# Patient Record
Sex: FEMALE | Race: BLACK OR AFRICAN AMERICAN | NOT HISPANIC OR LATINO | URBAN - METROPOLITAN AREA
[De-identification: names, ages, dates, MRNs, and addresses within clinical notes are randomized per-mention and may not be internally consistent; named-entity substitution may affect disease eponyms.]

---

## 2023-10-31 ENCOUNTER — INPATIENT (INPATIENT)
Facility: HOSPITAL | Age: 63
LOS: 6 days | Discharge: NURSING, REHAB OR LONG-TERM CARE | End: 2023-11-07
Attending: STUDENT IN AN ORGANIZED HEALTH CARE EDUCATION/TRAINING PROGRAM | Admitting: STUDENT IN AN ORGANIZED HEALTH CARE EDUCATION/TRAINING PROGRAM
Payer: MEDICARE

## 2023-10-31 VITALS
SYSTOLIC BLOOD PRESSURE: 149 MMHG | WEIGHT: 285.94 LBS | HEART RATE: 80 BPM | DIASTOLIC BLOOD PRESSURE: 73 MMHG | RESPIRATION RATE: 16 BRPM | TEMPERATURE: 98 F | HEIGHT: 65 IN | OXYGEN SATURATION: 98 %

## 2023-10-31 LAB
ALBUMIN SERPL ELPH-MCNC: 2.9 G/DL — LOW (ref 3.3–5)
ALBUMIN SERPL ELPH-MCNC: 2.9 G/DL — LOW (ref 3.3–5)
ALP SERPL-CCNC: 121 U/L — HIGH (ref 40–120)
ALP SERPL-CCNC: 121 U/L — HIGH (ref 40–120)
ALT FLD-CCNC: 34 U/L — SIGNIFICANT CHANGE UP (ref 12–78)
ALT FLD-CCNC: 34 U/L — SIGNIFICANT CHANGE UP (ref 12–78)
ANION GAP SERPL CALC-SCNC: 4 MMOL/L — LOW (ref 5–17)
ANION GAP SERPL CALC-SCNC: 4 MMOL/L — LOW (ref 5–17)
ANISOCYTOSIS BLD QL: SLIGHT — SIGNIFICANT CHANGE UP
ANISOCYTOSIS BLD QL: SLIGHT — SIGNIFICANT CHANGE UP
APPEARANCE UR: ABNORMAL
APPEARANCE UR: ABNORMAL
AST SERPL-CCNC: 22 U/L — SIGNIFICANT CHANGE UP (ref 15–37)
AST SERPL-CCNC: 22 U/L — SIGNIFICANT CHANGE UP (ref 15–37)
BACTERIA # UR AUTO: ABNORMAL
BACTERIA # UR AUTO: ABNORMAL
BASOPHILS # BLD AUTO: 0.03 K/UL — SIGNIFICANT CHANGE UP (ref 0–0.2)
BASOPHILS # BLD AUTO: 0.03 K/UL — SIGNIFICANT CHANGE UP (ref 0–0.2)
BASOPHILS NFR BLD AUTO: 0.4 % — SIGNIFICANT CHANGE UP (ref 0–2)
BASOPHILS NFR BLD AUTO: 0.4 % — SIGNIFICANT CHANGE UP (ref 0–2)
BILIRUB SERPL-MCNC: 0.6 MG/DL — SIGNIFICANT CHANGE UP (ref 0.2–1.2)
BILIRUB SERPL-MCNC: 0.6 MG/DL — SIGNIFICANT CHANGE UP (ref 0.2–1.2)
BILIRUB UR-MCNC: NEGATIVE — SIGNIFICANT CHANGE UP
BILIRUB UR-MCNC: NEGATIVE — SIGNIFICANT CHANGE UP
BUN SERPL-MCNC: 15 MG/DL — SIGNIFICANT CHANGE UP (ref 7–23)
BUN SERPL-MCNC: 15 MG/DL — SIGNIFICANT CHANGE UP (ref 7–23)
CALCIUM SERPL-MCNC: 8.3 MG/DL — LOW (ref 8.5–10.1)
CALCIUM SERPL-MCNC: 8.3 MG/DL — LOW (ref 8.5–10.1)
CHLORIDE SERPL-SCNC: 111 MMOL/L — HIGH (ref 96–108)
CHLORIDE SERPL-SCNC: 111 MMOL/L — HIGH (ref 96–108)
CO2 SERPL-SCNC: 25 MMOL/L — SIGNIFICANT CHANGE UP (ref 22–31)
CO2 SERPL-SCNC: 25 MMOL/L — SIGNIFICANT CHANGE UP (ref 22–31)
COLOR SPEC: YELLOW — SIGNIFICANT CHANGE UP
COLOR SPEC: YELLOW — SIGNIFICANT CHANGE UP
COMMENT - URINE: SIGNIFICANT CHANGE UP
COMMENT - URINE: SIGNIFICANT CHANGE UP
CREAT SERPL-MCNC: 1.1 MG/DL — SIGNIFICANT CHANGE UP (ref 0.5–1.3)
CREAT SERPL-MCNC: 1.1 MG/DL — SIGNIFICANT CHANGE UP (ref 0.5–1.3)
DIFF PNL FLD: ABNORMAL
DIFF PNL FLD: ABNORMAL
EGFR: 57 ML/MIN/1.73M2 — LOW
EGFR: 57 ML/MIN/1.73M2 — LOW
EOSINOPHIL # BLD AUTO: 0.2 K/UL — SIGNIFICANT CHANGE UP (ref 0–0.5)
EOSINOPHIL # BLD AUTO: 0.2 K/UL — SIGNIFICANT CHANGE UP (ref 0–0.5)
EOSINOPHIL NFR BLD AUTO: 2.7 % — SIGNIFICANT CHANGE UP (ref 0–6)
EOSINOPHIL NFR BLD AUTO: 2.7 % — SIGNIFICANT CHANGE UP (ref 0–6)
EPI CELLS # UR: SIGNIFICANT CHANGE UP
EPI CELLS # UR: SIGNIFICANT CHANGE UP
GLUCOSE SERPL-MCNC: 131 MG/DL — HIGH (ref 70–99)
GLUCOSE SERPL-MCNC: 131 MG/DL — HIGH (ref 70–99)
GLUCOSE UR QL: NEGATIVE MG/DL — SIGNIFICANT CHANGE UP
GLUCOSE UR QL: NEGATIVE MG/DL — SIGNIFICANT CHANGE UP
HCT VFR BLD CALC: 30.8 % — LOW (ref 34.5–45)
HCT VFR BLD CALC: 30.8 % — LOW (ref 34.5–45)
HGB BLD-MCNC: 9.6 G/DL — LOW (ref 11.5–15.5)
HGB BLD-MCNC: 9.6 G/DL — LOW (ref 11.5–15.5)
IMM GRANULOCYTES NFR BLD AUTO: 0.4 % — SIGNIFICANT CHANGE UP (ref 0–0.9)
IMM GRANULOCYTES NFR BLD AUTO: 0.4 % — SIGNIFICANT CHANGE UP (ref 0–0.9)
KETONES UR-MCNC: NEGATIVE — SIGNIFICANT CHANGE UP
KETONES UR-MCNC: NEGATIVE — SIGNIFICANT CHANGE UP
LEUKOCYTE ESTERASE UR-ACNC: ABNORMAL
LEUKOCYTE ESTERASE UR-ACNC: ABNORMAL
LYMPHOCYTES # BLD AUTO: 1.92 K/UL — SIGNIFICANT CHANGE UP (ref 1–3.3)
LYMPHOCYTES # BLD AUTO: 1.92 K/UL — SIGNIFICANT CHANGE UP (ref 1–3.3)
LYMPHOCYTES # BLD AUTO: 25.8 % — SIGNIFICANT CHANGE UP (ref 13–44)
LYMPHOCYTES # BLD AUTO: 25.8 % — SIGNIFICANT CHANGE UP (ref 13–44)
MANUAL SMEAR VERIFICATION: YES — SIGNIFICANT CHANGE UP
MANUAL SMEAR VERIFICATION: YES — SIGNIFICANT CHANGE UP
MCHC RBC-ENTMCNC: 22.4 PG — LOW (ref 27–34)
MCHC RBC-ENTMCNC: 22.4 PG — LOW (ref 27–34)
MCHC RBC-ENTMCNC: 31.2 G/DL — LOW (ref 32–36)
MCHC RBC-ENTMCNC: 31.2 G/DL — LOW (ref 32–36)
MCV RBC AUTO: 72 FL — LOW (ref 80–100)
MCV RBC AUTO: 72 FL — LOW (ref 80–100)
MONOCYTES # BLD AUTO: 0.53 K/UL — SIGNIFICANT CHANGE UP (ref 0–0.9)
MONOCYTES # BLD AUTO: 0.53 K/UL — SIGNIFICANT CHANGE UP (ref 0–0.9)
MONOCYTES NFR BLD AUTO: 7.1 % — SIGNIFICANT CHANGE UP (ref 2–14)
MONOCYTES NFR BLD AUTO: 7.1 % — SIGNIFICANT CHANGE UP (ref 2–14)
NEUTROPHILS # BLD AUTO: 4.74 K/UL — SIGNIFICANT CHANGE UP (ref 1.8–7.4)
NEUTROPHILS # BLD AUTO: 4.74 K/UL — SIGNIFICANT CHANGE UP (ref 1.8–7.4)
NEUTROPHILS NFR BLD AUTO: 63.6 % — SIGNIFICANT CHANGE UP (ref 43–77)
NEUTROPHILS NFR BLD AUTO: 63.6 % — SIGNIFICANT CHANGE UP (ref 43–77)
NITRITE UR-MCNC: NEGATIVE — SIGNIFICANT CHANGE UP
NITRITE UR-MCNC: NEGATIVE — SIGNIFICANT CHANGE UP
NRBC # BLD: 0 /100 WBCS — SIGNIFICANT CHANGE UP (ref 0–0)
NRBC # BLD: 0 /100 WBCS — SIGNIFICANT CHANGE UP (ref 0–0)
OVALOCYTES BLD QL SMEAR: SLIGHT — SIGNIFICANT CHANGE UP
OVALOCYTES BLD QL SMEAR: SLIGHT — SIGNIFICANT CHANGE UP
PH UR: 6 — SIGNIFICANT CHANGE UP (ref 5–8)
PH UR: 6 — SIGNIFICANT CHANGE UP (ref 5–8)
PLAT MORPH BLD: NORMAL — SIGNIFICANT CHANGE UP
PLAT MORPH BLD: NORMAL — SIGNIFICANT CHANGE UP
PLATELET # BLD AUTO: 252 K/UL — SIGNIFICANT CHANGE UP (ref 150–400)
PLATELET # BLD AUTO: 252 K/UL — SIGNIFICANT CHANGE UP (ref 150–400)
POTASSIUM SERPL-MCNC: 3.6 MMOL/L — SIGNIFICANT CHANGE UP (ref 3.5–5.3)
POTASSIUM SERPL-MCNC: 3.6 MMOL/L — SIGNIFICANT CHANGE UP (ref 3.5–5.3)
POTASSIUM SERPL-SCNC: 3.6 MMOL/L — SIGNIFICANT CHANGE UP (ref 3.5–5.3)
POTASSIUM SERPL-SCNC: 3.6 MMOL/L — SIGNIFICANT CHANGE UP (ref 3.5–5.3)
PROT SERPL-MCNC: 9.5 GM/DL — HIGH (ref 6–8.3)
PROT SERPL-MCNC: 9.5 GM/DL — HIGH (ref 6–8.3)
PROT UR-MCNC: 100 MG/DL
PROT UR-MCNC: 100 MG/DL
RBC # BLD: 4.28 M/UL — SIGNIFICANT CHANGE UP (ref 3.8–5.2)
RBC # BLD: 4.28 M/UL — SIGNIFICANT CHANGE UP (ref 3.8–5.2)
RBC # FLD: 20.4 % — HIGH (ref 10.3–14.5)
RBC # FLD: 20.4 % — HIGH (ref 10.3–14.5)
RBC BLD AUTO: ABNORMAL
RBC BLD AUTO: ABNORMAL
RBC CASTS # UR COMP ASSIST: ABNORMAL /HPF (ref 0–4)
RBC CASTS # UR COMP ASSIST: ABNORMAL /HPF (ref 0–4)
SODIUM SERPL-SCNC: 140 MMOL/L — SIGNIFICANT CHANGE UP (ref 135–145)
SODIUM SERPL-SCNC: 140 MMOL/L — SIGNIFICANT CHANGE UP (ref 135–145)
SP GR SPEC: 1.01 — SIGNIFICANT CHANGE UP (ref 1.01–1.02)
SP GR SPEC: 1.01 — SIGNIFICANT CHANGE UP (ref 1.01–1.02)
UROBILINOGEN FLD QL: NEGATIVE MG/DL — SIGNIFICANT CHANGE UP
UROBILINOGEN FLD QL: NEGATIVE MG/DL — SIGNIFICANT CHANGE UP
WBC # BLD: 7.45 K/UL — SIGNIFICANT CHANGE UP (ref 3.8–10.5)
WBC # BLD: 7.45 K/UL — SIGNIFICANT CHANGE UP (ref 3.8–10.5)
WBC # FLD AUTO: 7.45 K/UL — SIGNIFICANT CHANGE UP (ref 3.8–10.5)
WBC # FLD AUTO: 7.45 K/UL — SIGNIFICANT CHANGE UP (ref 3.8–10.5)
WBC UR QL: >50
WBC UR QL: >50

## 2023-10-31 PROCEDURE — 99285 EMERGENCY DEPT VISIT HI MDM: CPT

## 2023-10-31 RX ORDER — DEXTROSE 50 % IN WATER 50 %
25 SYRINGE (ML) INTRAVENOUS ONCE
Refills: 0 | Status: DISCONTINUED | OUTPATIENT
Start: 2023-10-31 | End: 2023-11-07

## 2023-10-31 RX ORDER — DEXTROSE 50 % IN WATER 50 %
15 SYRINGE (ML) INTRAVENOUS ONCE
Refills: 0 | Status: DISCONTINUED | OUTPATIENT
Start: 2023-10-31 | End: 2023-11-07

## 2023-10-31 RX ORDER — SODIUM CHLORIDE 9 MG/ML
1000 INJECTION, SOLUTION INTRAVENOUS
Refills: 0 | Status: DISCONTINUED | OUTPATIENT
Start: 2023-10-31 | End: 2023-11-07

## 2023-10-31 RX ORDER — DEXTROSE 50 % IN WATER 50 %
12.5 SYRINGE (ML) INTRAVENOUS ONCE
Refills: 0 | Status: DISCONTINUED | OUTPATIENT
Start: 2023-10-31 | End: 2023-11-07

## 2023-10-31 RX ORDER — GLUCAGON INJECTION, SOLUTION 0.5 MG/.1ML
1 INJECTION, SOLUTION SUBCUTANEOUS ONCE
Refills: 0 | Status: DISCONTINUED | OUTPATIENT
Start: 2023-10-31 | End: 2023-11-07

## 2023-10-31 RX ORDER — INSULIN LISPRO 100/ML
VIAL (ML) SUBCUTANEOUS
Refills: 0 | Status: DISCONTINUED | OUTPATIENT
Start: 2023-10-31 | End: 2023-11-07

## 2023-10-31 RX ORDER — HEPARIN SODIUM 5000 [USP'U]/ML
5000 INJECTION INTRAVENOUS; SUBCUTANEOUS EVERY 8 HOURS
Refills: 0 | Status: DISCONTINUED | OUTPATIENT
Start: 2023-10-31 | End: 2023-11-07

## 2023-10-31 RX ORDER — CHLORHEXIDINE GLUCONATE 213 G/1000ML
1 SOLUTION TOPICAL
Refills: 0 | Status: DISCONTINUED | OUTPATIENT
Start: 2023-10-31 | End: 2023-11-07

## 2023-10-31 RX ORDER — ACETAMINOPHEN 500 MG
650 TABLET ORAL ONCE
Refills: 0 | Status: COMPLETED | OUTPATIENT
Start: 2023-10-31 | End: 2023-10-31

## 2023-10-31 RX ADMIN — Medication 325 MILLIGRAM(S): at 05:22

## 2023-10-31 NOTE — ED ADULT NURSE REASSESSMENT NOTE - NS ED NURSE REASSESS COMMENT FT1
Pt drifting in and out of sleep at this time. Pt is uncooperative with staff and refusing blood work, Dr. Liao made aware. Pt only took partial tylenol dose pt refusing to take second pill, dosage taken documented at this time. Pt educated the benefits of taking blood work at this time but continues to refuse.

## 2023-10-31 NOTE — PHYSICAL THERAPY INITIAL EVALUATION ADULT - PERTINENT HX OF CURRENT PROBLEM, REHAB EVAL
BLE pain.   As per ED provider note "63F PMH HTN, HLD, DM, chronic BLE lymphedema BIBEMS d/t social issue. Pt reports recent d/c v AMA from rehab (in rehab d/t issue w/ her legs), pt returned to home w/ daughter, pt & daughter w/ disagreement, pt left home to go to shelter, pt out in cold x2-3hrs awaiting ride to shelter, when ride did not come, pt called 911 to bring her to ED. Pt endorses chills, LE pain."   As per pt, she had this lymphedema wound for last 8 months, wound care dressing done in [facility] ([place]). PT attempted to see BLE wounds RLE- more with slough, however pt stated she wants to see a doctor to see it. Hence vascular surgery consult recommended

## 2023-10-31 NOTE — ED PROVIDER NOTE - PROGRESS NOTE DETAILS
Pt refused blood work. Patient signed out to me pending [facility] merida. Per daughter Mayelin, [phone] patient cannot stay with her as she does not have room at her house. Patient states she could go to a shelter, however per PT should return to [facility]. VETO unsure if patient has any remaining [facility] days given recent admission, they will evaluate. Duran DO: pt signed out to me pending SW eval and possible rehab placement, pt refusing hospital admission, refusing to go to rehab, states she Duran DO: pt signed out to me pending SW eval and possible rehab placement, pt refusing hospital admission, refusing to go to rehab, states she does not want to stay Duran DO: after much back and forth with pt and with SW, pt now amenable to hospital admission, needs special equipment for home/ mobility, has no where safe to go, recommended for [facility], d/w hospitalist

## 2023-10-31 NOTE — PHYSICAL THERAPY INITIAL EVALUATION ADULT - ADDITIONAL COMMENTS
As per pt she was in [facility], came to daughter house, now planning to go to  [facility] or shelter. Pt is independent with rollator at all times, has BLE lymphedema with wounds at this time,

## 2023-10-31 NOTE — ED ADULT NURSE NOTE - NSFALLUNIVINTERV_ED_ALL_ED
Bed/Stretcher in lowest position, wheels locked, appropriate side rails in place/Call bell, personal items and telephone in reach/Instruct patient to call for assistance before getting out of bed/chair/stretcher/Non-slip footwear applied when patient is off stretcher/[place] to call system/Physically safe environment - no spills, clutter or unnecessary equipment/Purposeful proactive rounding/Room/bathroom lighting operational, light cord in reach

## 2023-10-31 NOTE — PHYSICAL THERAPY INITIAL EVALUATION ADULT - STRENGTHENING, PT EVAL
Pt will improve muscle strength in all extremities to WFL in 1 to 2 weeks to perform Gait independently

## 2023-10-31 NOTE — H&P ADULT - NSHPLABSRESULTS_GEN_ALL_CORE
(10-31 @ 12:50)                      9.6  7.45 )-----------( 252                 30.8    Neutrophils = 4.74 (63.6%)  Lymphocytes = 1.92 (25.8%)  Eosinophils = 0.20 (2.7%)  Basophils = 0.03 (0.4%)  Monocytes = 0.53 (7.1%)  Bands = --%    10-31    140  |  111<H>  |  15  ----------------------------<  131<H>  3.6   |  25  |  1.10    Ca    8.3<L>      31 Oct 2023 12:50    TPro  9.5<H>  /  Alb  2.9<L>  /  TBili  0.6  /  DBili  x   /  AST  22  /  ALT  34  /  AlkPhos  121<H>  10-31          RVP:          Tox:         Urinalysis Basic - ( 31 Oct 2023 12:50 )    Color: Yellow / Appearance: very cloudy / S.015 / pH: x  Gluc: 131 mg/dL / Ketone: Negative  / Bili: Negative / Urobili: Negative mg/dL   Blood: x / Protein: 100 mg/dL / Nitrite: Negative   Leuk Esterase: Moderate / RBC: 25-50 /HPF / WBC >50   Sq Epi: x / Non Sq Epi: x / Bacteria: Many

## 2023-10-31 NOTE — PHYSICAL THERAPY INITIAL EVALUATION ADULT - LIVES WITH, PROFILE
was in a [facility], signed AMA, came to daughter house now some personal issues with dtr, wants to  go to [facility] or shelter

## 2023-10-31 NOTE — ED PROVIDER NOTE - OBJECTIVE STATEMENT
63F PMH HTN, HLD, DM, chronic BLE lymphedema BIBEMS d/t social issue. Pt reports recent d/c v AMA from rehab (in rehab d/t issue w/ her legs), pt returned to home w/ daughter, pt & daughter w/ disagreement, pt left home to go to shelter, pt out in cold x2-3hrs awaiting ride to shelter, when ride did not come, pt called 911 to bring her to ED. Pt endorses chills, LE pain. Denies fever, h/a, dizziness, CP, SOB, cough, abd pain, back pain, N/V/D/C, UTI sx, recent travel or sick contacts.     PMH as above, PSH none, NKDA, meds as listed.

## 2023-10-31 NOTE — PATIENT PROFILE ADULT - FALL HARM RISK - HARM RISK INTERVENTIONS

## 2023-10-31 NOTE — ED ADULT NURSE NOTE - OBJECTIVE STATEMENT
Pt A&Ox3. Pt complains of pain and swelling to bilateral legs. History of lymphedema. Pt states she was standing outside waiting for a ride to go to the [facility] center x 2-3 hours and no one came. Pt is uncooperative at this time. Pt refuses to change into hospital gown and refuses to answer all assessment questions. When asked how long her legs have been swollen pt states "I cant answer that, i've always had lymphedema". Pt denies chest pain, difficulty breathing, N/V/D, fever, chills at this time. Pt states "I have SOB off and on" unable to tell me when the SOB began. PMHx DM, HTN. Pt walked in with walker at this time with many bags of belongings and states " me and my stuff is a package deal, it stays with me". Belongings are with pt at this time, placed to the side of the bed. Pt refuses to lay down on stretcher and is sitting on the side of the bed. Bilateral legs appear swollen at this time and ankles are dry the skin apears scaly.

## 2023-10-31 NOTE — ED PROVIDER NOTE - PHYSICAL EXAMINATION
GEN: Awake, alert, interactive, NAD.  HEAD AND NECK: NC/AT. Airway patent. Neck supple.   EYES:  Clear b/l. EOMI. PERRL.   ENT: Moist mucus membranes.   CARDIAC: Regular rate, regular rhythm. No evident pedal edema.    RESP/CHEST: Normal respiratory effort with no use of accessory muscles or retractions. Clear throughout on auscultation.  ABD: Soft, non-distended, non-tender. No rebound, no guarding.   BACK: No midline spinal TTP. No CVAT.   EXTREMITIES: BLE edema c/w pt hx lymphedema.    SKIN: Warm, dry, intact normal color. No rash.   NEURO: AOx3, CN II-XII grossly intact, no focal deficits.   PSYCH: Appropriate mood and affect.

## 2023-10-31 NOTE — H&P ADULT - NSHPPHYSICALEXAM_GEN_ALL_CORE
PHYSICAL EXAM:  GENERAL: NAD, lying in bed comfortably  HEAD:  Atraumatic, Normocephalic  EYES: EOMI, PERRLA, conjunctiva and sclera clear  ENT: Moist mucous membranes  NECK: Supple, No JVD  CHEST/LUNG: Clear to auscultation bilaterally; No rales, rhonchi, wheezing, or rubs. Unlabored respirations  HEART: Regular rate and rhythm; No murmurs, rubs, or gallops  ABDOMEN: Bowel sounds present; Soft, Nontender, Nondistended. No hepatomegaly  NERVOUS SYSTEM:  Alert & Oriented X3, speech clear. No deficits   MSK: FROM all 4 extremities, full and equal strength  SKIN: BLE lymphedema

## 2023-10-31 NOTE — PHYSICAL THERAPY INITIAL EVALUATION ADULT - CRITERIA FOR SKILLED THERAPEUTIC INTERVENTIONS
Medication/Dose: multiple meds    Normal Creatinine within last 12 months looking at last value    Normal AST in last 12 months looking at last value    Normal WBC, HGB, HCT & PLT in last 12 months looking at last value    Normal ALT in last 12 months looking at last value   Patient last seen by PCP: 07/20/22  Next office visit with PCP: 01/19/22  Last Lab:01/03/22  Last Ordered: 08/08/22  PDMP needs to be reviewed by Provider    Sent to provider to approve or deny         impairments found/anticipated equipment needs at discharge/anticipated discharge recommendation

## 2023-10-31 NOTE — PATIENT PROFILE ADULT - NSTOBACCOUNKNOWNSMK_GEN_A_CORE_RD
Refusal Spray Paint Text: The liquid nitrogen was applied to the skin utilizing a spray paint frosting technique. Medical Necessity Clause: This procedure was medically necessary because the lesions that were treated were: Medical Necessity Information: It is in your best interest to select a reason for this procedure from the list below. All of these items fulfill various CMS LCD requirements except the new and changing color options. Show Spray Paint Technique Variable?: Yes Render Post-Care Instructions In Note?: no Post-Care Instructions: I reviewed with the patient in detail post-care instructions. Patient is to wear sunprotection, and avoid picking at any of the treated lesions. Pt may apply Vaseline to crusted or scabbing areas. Detail Level: Detailed Consent: The patient's consent was obtained including but not limited to risks of crusting, scabbing, blistering, scarring, darker or lighter pigmentary change, recurrence, incomplete removal and infection.

## 2023-10-31 NOTE — H&P ADULT - HISTORY OF PRESENT ILLNESS
Ms. Saucedo is a 63F with a reported past medical history of T2DM, b/l chronic lymphedema, HTN, HLD who presented from home due to a social issue at home. Patient was admitted to a rehab facility for rehabilitation of her legs when she left AMA. She returned home with her daughter, then reportedly had a disagreement which led her back to the ED for PT and placement. She reports her and her daughter have several disagreements and legal issues. No major medical complaints.   In the ED, she was afebrile, mildly hypertensive. Blood work consistent with microcytic anemia and elevated protein. Admit to medicine for SW and PT eval.

## 2023-10-31 NOTE — PHYSICAL THERAPY INITIAL EVALUATION ADULT - TRANSFER TRAINING, PT EVAL
Pt will be able to perform sit to stand, stand pivot transfer using [Rollator independently in 2 to 3 weeks

## 2023-10-31 NOTE — ED ADULT TRIAGE NOTE - CHIEF COMPLAINT QUOTE
Pt complains of pain and swelling to bilateral legs. History of lymphedema. Pt states she was standing outside waiting [facility] x [facility] hours and no one came.

## 2023-10-31 NOTE — ED PROVIDER NOTE - CLINICAL SUMMARY MEDICAL DECISION MAKING FREE TEXT BOX
63F PMH HTN, HLD, DM, chronic BLE lymphedema BIBEMS d/t social issue. Afebrile, VSS. In NAD. Plan for CBC, CMP. Give Tylenol. Consult SW in AM. Pt care signed out at change of shift.

## 2023-10-31 NOTE — ED ADULT NURSE NOTE - CHIEF COMPLAINT QUOTE
Pt complains of pain and swelling to bilateral legs. History of lymphedema. Pt states she was standing outside [facility] and no one came.

## 2023-10-31 NOTE — H&P ADULT - ASSESSMENT
CLARK DENNIS is a 63y Female with a past medical history as described above who presented for evaluation of a social issue.    # Failure to thrive   - Brought to ED after daughter and pt had dispute. Need SW and PT eval for rehab placement.   - Albumin low, but total protein elevated. Check SPEP and IF  - Supplement with Ensure   - Increase activity as tolerated   - Local wound care to wounds on leg and lymphedema care     # Microcytic anemia   - Iron panel   - Reticulocyte count   - B12/folate     # T2DM  - Reports she takes Januvia, cannot recall dose. FS qAC and qHS, insulin protocol if needed   - CC diet     # HTN  #HLD  - Reports she takes Liptor, cannot recall dose. Verify with patient, family, or pharmacy in AM.   - CC diet     DVT ppx: Heparin  GI ppx: Not indicated   Activity: IAT  Diet: CC  Code: Full

## 2023-10-31 NOTE — ED ADULT NURSE REASSESSMENT NOTE - NS ED NURSE REASSESS COMMENT FT1
Pt A&Ox3. Pt continues walking around after being asked to sit down in the stretcher. Pt refuses. Pt made a phone call and is talking on the phone at this time. Pt asked for urine sample but states she doesn't have to go at this time. Pt is agitated and uncooperative at this time, after several attempts to get the pt in bed.

## 2023-10-31 NOTE — PHYSICAL THERAPY INITIAL EVALUATION ADULT - BALANCE TRAINING, PT EVAL
Pt will improve static & dynamic standing balance to Good using [Rollator  to perform  Gait independently in 4 weeks

## 2023-10-31 NOTE — H&P ADULT - NSHPREVIEWOFSYSTEMS_GEN_ALL_CORE
REVIEW OF SYSTEMS:    CONSTITUTIONAL: No weakness, fevers or chills  EYES/ENT: No visual changes;  No vertigo or throat pain   NECK: No pain or stiffness  RESPIRATORY: No cough, wheezing, hemoptysis; No shortness of breath  CARDIOVASCULAR: No chest pain or palpitations  GASTROINTESTINAL: No abdominal or epigastric pain. No nausea, vomiting, or hematemesis; No diarrhea or constipation. No melena or hematochezia.  GENITOURINARY: No dysuria, frequency or hematuria  NEUROLOGICAL: No numbness or weakness  SKIN: Weeping wound on R leg

## 2023-11-01 LAB
A1C WITH ESTIMATED AVERAGE GLUCOSE RESULT: 6.9 % — HIGH (ref 4–5.6)
A1C WITH ESTIMATED AVERAGE GLUCOSE RESULT: 6.9 % — HIGH (ref 4–5.6)
ALBUMIN SERPL ELPH-MCNC: 2.6 G/DL — LOW (ref 3.3–5)
ALBUMIN SERPL ELPH-MCNC: 2.6 G/DL — LOW (ref 3.3–5)
ALP SERPL-CCNC: 104 U/L — SIGNIFICANT CHANGE UP (ref 40–120)
ALP SERPL-CCNC: 104 U/L — SIGNIFICANT CHANGE UP (ref 40–120)
ALT FLD-CCNC: 26 U/L — SIGNIFICANT CHANGE UP (ref 12–78)
ALT FLD-CCNC: 26 U/L — SIGNIFICANT CHANGE UP (ref 12–78)
ANION GAP SERPL CALC-SCNC: 7 MMOL/L — SIGNIFICANT CHANGE UP (ref 5–17)
ANION GAP SERPL CALC-SCNC: 7 MMOL/L — SIGNIFICANT CHANGE UP (ref 5–17)
AST SERPL-CCNC: 16 U/L — SIGNIFICANT CHANGE UP (ref 15–37)
AST SERPL-CCNC: 16 U/L — SIGNIFICANT CHANGE UP (ref 15–37)
BILIRUB SERPL-MCNC: 0.5 MG/DL — SIGNIFICANT CHANGE UP (ref 0.2–1.2)
BILIRUB SERPL-MCNC: 0.5 MG/DL — SIGNIFICANT CHANGE UP (ref 0.2–1.2)
BUN SERPL-MCNC: 16 MG/DL — SIGNIFICANT CHANGE UP (ref 7–23)
BUN SERPL-MCNC: 16 MG/DL — SIGNIFICANT CHANGE UP (ref 7–23)
CALCIUM SERPL-MCNC: 8.1 MG/DL — LOW (ref 8.5–10.1)
CALCIUM SERPL-MCNC: 8.1 MG/DL — LOW (ref 8.5–10.1)
CHLORIDE SERPL-SCNC: 106 MMOL/L — SIGNIFICANT CHANGE UP (ref 96–108)
CHLORIDE SERPL-SCNC: 106 MMOL/L — SIGNIFICANT CHANGE UP (ref 96–108)
CO2 SERPL-SCNC: 27 MMOL/L — SIGNIFICANT CHANGE UP (ref 22–31)
CO2 SERPL-SCNC: 27 MMOL/L — SIGNIFICANT CHANGE UP (ref 22–31)
CREAT SERPL-MCNC: 0.92 MG/DL — SIGNIFICANT CHANGE UP (ref 0.5–1.3)
CREAT SERPL-MCNC: 0.92 MG/DL — SIGNIFICANT CHANGE UP (ref 0.5–1.3)
EGFR: 71 ML/MIN/1.73M2 — SIGNIFICANT CHANGE UP
EGFR: 71 ML/MIN/1.73M2 — SIGNIFICANT CHANGE UP
ESTIMATED AVERAGE GLUCOSE: 151 MG/DL — HIGH (ref 68–114)
ESTIMATED AVERAGE GLUCOSE: 151 MG/DL — HIGH (ref 68–114)
FERRITIN SERPL-MCNC: 40 NG/ML — SIGNIFICANT CHANGE UP (ref 13–330)
FERRITIN SERPL-MCNC: 40 NG/ML — SIGNIFICANT CHANGE UP (ref 13–330)
FOLATE SERPL-MCNC: 15 NG/ML — SIGNIFICANT CHANGE UP
FOLATE SERPL-MCNC: 15 NG/ML — SIGNIFICANT CHANGE UP
GLUCOSE BLDC GLUCOMTR-MCNC: 139 MG/DL — HIGH (ref 70–99)
GLUCOSE BLDC GLUCOMTR-MCNC: 139 MG/DL — HIGH (ref 70–99)
GLUCOSE BLDC GLUCOMTR-MCNC: 144 MG/DL — HIGH (ref 70–99)
GLUCOSE BLDC GLUCOMTR-MCNC: 144 MG/DL — HIGH (ref 70–99)
GLUCOSE BLDC GLUCOMTR-MCNC: 158 MG/DL — HIGH (ref 70–99)
GLUCOSE BLDC GLUCOMTR-MCNC: 158 MG/DL — HIGH (ref 70–99)
GLUCOSE SERPL-MCNC: 137 MG/DL — HIGH (ref 70–99)
GLUCOSE SERPL-MCNC: 137 MG/DL — HIGH (ref 70–99)
HCT VFR BLD CALC: 27.9 % — LOW (ref 34.5–45)
HCT VFR BLD CALC: 27.9 % — LOW (ref 34.5–45)
HCV AB S/CO SERPL IA: 0.17 S/CO — SIGNIFICANT CHANGE UP (ref 0–0.99)
HCV AB S/CO SERPL IA: 0.17 S/CO — SIGNIFICANT CHANGE UP (ref 0–0.99)
HCV AB SERPL-IMP: SIGNIFICANT CHANGE UP
HCV AB SERPL-IMP: SIGNIFICANT CHANGE UP
HGB BLD-MCNC: 8.6 G/DL — LOW (ref 11.5–15.5)
HGB BLD-MCNC: 8.6 G/DL — LOW (ref 11.5–15.5)
IRON SATN MFR SERPL: 10 % — LOW (ref 14–50)
IRON SATN MFR SERPL: 10 % — LOW (ref 14–50)
IRON SATN MFR SERPL: 22 UG/DL — LOW (ref 30–160)
IRON SATN MFR SERPL: 22 UG/DL — LOW (ref 30–160)
MAGNESIUM SERPL-MCNC: 2.1 MG/DL — SIGNIFICANT CHANGE UP (ref 1.6–2.6)
MAGNESIUM SERPL-MCNC: 2.1 MG/DL — SIGNIFICANT CHANGE UP (ref 1.6–2.6)
MCHC RBC-ENTMCNC: 22.3 PG — LOW (ref 27–34)
MCHC RBC-ENTMCNC: 22.3 PG — LOW (ref 27–34)
MCHC RBC-ENTMCNC: 30.8 G/DL — LOW (ref 32–36)
MCHC RBC-ENTMCNC: 30.8 G/DL — LOW (ref 32–36)
MCV RBC AUTO: 72.3 FL — LOW (ref 80–100)
MCV RBC AUTO: 72.3 FL — LOW (ref 80–100)
NRBC # BLD: 0 /100 WBCS — SIGNIFICANT CHANGE UP (ref 0–0)
NRBC # BLD: 0 /100 WBCS — SIGNIFICANT CHANGE UP (ref 0–0)
PLATELET # BLD AUTO: 236 K/UL — SIGNIFICANT CHANGE UP (ref 150–400)
PLATELET # BLD AUTO: 236 K/UL — SIGNIFICANT CHANGE UP (ref 150–400)
POTASSIUM SERPL-MCNC: 3.6 MMOL/L — SIGNIFICANT CHANGE UP (ref 3.5–5.3)
POTASSIUM SERPL-MCNC: 3.6 MMOL/L — SIGNIFICANT CHANGE UP (ref 3.5–5.3)
POTASSIUM SERPL-SCNC: 3.6 MMOL/L — SIGNIFICANT CHANGE UP (ref 3.5–5.3)
POTASSIUM SERPL-SCNC: 3.6 MMOL/L — SIGNIFICANT CHANGE UP (ref 3.5–5.3)
PROT SERPL-MCNC: 8.1 G/DL — SIGNIFICANT CHANGE UP (ref 6–8.3)
PROT SERPL-MCNC: 8.1 G/DL — SIGNIFICANT CHANGE UP (ref 6–8.3)
PROT SERPL-MCNC: 8.4 GM/DL — HIGH (ref 6–8.3)
PROT SERPL-MCNC: 8.4 GM/DL — HIGH (ref 6–8.3)
RBC # BLD: 3.86 M/UL — SIGNIFICANT CHANGE UP (ref 3.8–5.2)
RBC # FLD: 20.2 % — HIGH (ref 10.3–14.5)
RBC # FLD: 20.2 % — HIGH (ref 10.3–14.5)
RETICS #: 45.9 K/UL — SIGNIFICANT CHANGE UP (ref 25–125)
RETICS #: 45.9 K/UL — SIGNIFICANT CHANGE UP (ref 25–125)
RETICS/RBC NFR: 1.2 % — SIGNIFICANT CHANGE UP (ref 0.5–2.5)
RETICS/RBC NFR: 1.2 % — SIGNIFICANT CHANGE UP (ref 0.5–2.5)
SODIUM SERPL-SCNC: 140 MMOL/L — SIGNIFICANT CHANGE UP (ref 135–145)
SODIUM SERPL-SCNC: 140 MMOL/L — SIGNIFICANT CHANGE UP (ref 135–145)
TIBC SERPL-MCNC: 228 UG/DL — SIGNIFICANT CHANGE UP (ref 220–430)
TIBC SERPL-MCNC: 228 UG/DL — SIGNIFICANT CHANGE UP (ref 220–430)
UIBC SERPL-MCNC: 206 UG/DL — SIGNIFICANT CHANGE UP (ref 110–370)
UIBC SERPL-MCNC: 206 UG/DL — SIGNIFICANT CHANGE UP (ref 110–370)
VIT B12 SERPL-MCNC: 656 PG/ML — SIGNIFICANT CHANGE UP (ref 232–1245)
VIT B12 SERPL-MCNC: 656 PG/ML — SIGNIFICANT CHANGE UP (ref 232–1245)
WBC # BLD: 6.12 K/UL — SIGNIFICANT CHANGE UP (ref 3.8–10.5)
WBC # BLD: 6.12 K/UL — SIGNIFICANT CHANGE UP (ref 3.8–10.5)
WBC # FLD AUTO: 6.12 K/UL — SIGNIFICANT CHANGE UP (ref 3.8–10.5)
WBC # FLD AUTO: 6.12 K/UL — SIGNIFICANT CHANGE UP (ref 3.8–10.5)

## 2023-11-01 PROCEDURE — 99232 SBSQ HOSP IP/OBS MODERATE 35: CPT

## 2023-11-02 PROCEDURE — 99232 SBSQ HOSP IP/OBS MODERATE 35: CPT

## 2023-11-02 RX ORDER — IRON SUCROSE 20 MG/ML
100 INJECTION, SOLUTION INTRAVENOUS EVERY 24 HOURS
Refills: 0 | Status: DISCONTINUED | OUTPATIENT
Start: 2023-11-02 | End: 2023-11-07

## 2023-11-02 RX ADMIN — CHLORHEXIDINE GLUCONATE 1 APPLICATION(S): 213 SOLUTION TOPICAL at 05:58

## 2023-11-02 NOTE — DIETITIAN NUTRITION RISK NOTIFICATION - TREATMENT: THE FOLLOWING DIET HAS BEEN RECOMMENDED
Diet, Consistent Carbohydrate w/Evening Snack:   Low Sodium  Supplement Feeding Modality:  Oral  Glucerna Shake Cans or Servings Per Day:  1       Frequency:  Daily (11-02-23 @ 11:09) [Pending Verification By Attending]  Diet, Consistent Carbohydrate w/Evening Snack (10-31-23 @ 16:25) [Active]

## 2023-11-02 NOTE — DIETITIAN INITIAL EVALUATION ADULT - PERTINENT MEDS FT
MEDICATIONS  (STANDING):  chlorhexidine 2% Cloths 1 Application(s) Topical <User Schedule>  dextrose 5%. 1000 milliLiter(s) (100 mL/Hr) IV Continuous <Continuous>  dextrose 5%. 1000 milliLiter(s) (50 mL/Hr) IV Continuous <Continuous>  dextrose 50% Injectable 25 Gram(s) IV Push once  dextrose 50% Injectable 25 Gram(s) IV Push once  dextrose 50% Injectable 12.5 Gram(s) IV Push once  glucagon  Injectable 1 milliGRAM(s) IntraMuscular once  heparin   Injectable 5000 Unit(s) SubCutaneous every 8 hours  insulin lispro (ADMELOG) corrective regimen sliding scale   SubCutaneous three times a day before meals  iron sucrose IVPB 100 milliGRAM(s) IV Intermittent every 24 hours    MEDICATIONS  (PRN):  dextrose Oral Gel 15 Gram(s) Oral once PRN Blood Glucose LESS THAN 70 milliGRAM(s)/deciliter

## 2023-11-02 NOTE — DIETITIAN INITIAL EVALUATION ADULT - EDUCATION DIETARY MODIFICATIONS
Contacted pt. Verified identity of pt by name and  verbally.     Dr. Heredia's note conveyed. Patient verbalized understanding without further questions.    He will continue because having wheezing and cough.    (2) meets goals/outcomes/verbalization

## 2023-11-02 NOTE — DIETITIAN INITIAL EVALUATION ADULT - PERSON TAUGHT/METHOD
Reinforcement of portion-control, consistent CHO, low Na diet./verbal instruction/patient instructed

## 2023-11-02 NOTE — DIETITIAN INITIAL EVALUATION ADULT - PERTINENT LABORATORY DATA
11-01    140  |  106  |  16  ----------------------------<  137<H>  3.6   |  27  |  0.92    Ca    8.1<L>      01 Nov 2023 08:20  Mg     2.1     11-01    TPro  8.4<H>  /  Alb  2.6<L>  /  TBili  0.5  /  DBili  x   /  AST  16  /  ALT  26  /  AlkPhos  104  11-01  POCT Blood Glucose.: 139 mg/dL (11-01-23 @ 21:34)  A1C with Estimated Average Glucose Result: 6.9 % (11-01-23 @ 08:20)

## 2023-11-02 NOTE — DIETITIAN INITIAL EVALUATION ADULT - OTHER INFO
Pt with PMH of HTN, HLD, T2DM, chronic b/l lymphedema; presented from home due to social issue at home. SW and PT to evaluate.  Pt states good/normal appetite PTA, however has been trying to lose wt and limit her portion sizes. States  lbs.; wt appears stable.  UoeW2y=5.9%, which indicates good blood glucose control PTA; takes Januvia to control BG levels PTA.  Consuming mostly % of meals during admission; will add Glucerna to optimize intake. Denies any chew/swallowing difficulty or any N/V/D/C.

## 2023-11-03 LAB
ANION GAP SERPL CALC-SCNC: 4 MMOL/L — LOW (ref 5–17)
ANION GAP SERPL CALC-SCNC: 4 MMOL/L — LOW (ref 5–17)
BUN SERPL-MCNC: 20 MG/DL — SIGNIFICANT CHANGE UP (ref 7–23)
BUN SERPL-MCNC: 20 MG/DL — SIGNIFICANT CHANGE UP (ref 7–23)
CALCIUM SERPL-MCNC: 8.4 MG/DL — LOW (ref 8.5–10.1)
CALCIUM SERPL-MCNC: 8.4 MG/DL — LOW (ref 8.5–10.1)
CHLORIDE SERPL-SCNC: 107 MMOL/L — SIGNIFICANT CHANGE UP (ref 96–108)
CHLORIDE SERPL-SCNC: 107 MMOL/L — SIGNIFICANT CHANGE UP (ref 96–108)
CO2 SERPL-SCNC: 27 MMOL/L — SIGNIFICANT CHANGE UP (ref 22–31)
CO2 SERPL-SCNC: 27 MMOL/L — SIGNIFICANT CHANGE UP (ref 22–31)
CREAT SERPL-MCNC: 0.9 MG/DL — SIGNIFICANT CHANGE UP (ref 0.5–1.3)
CREAT SERPL-MCNC: 0.9 MG/DL — SIGNIFICANT CHANGE UP (ref 0.5–1.3)
EGFR: 73 ML/MIN/1.73M2 — SIGNIFICANT CHANGE UP
EGFR: 73 ML/MIN/1.73M2 — SIGNIFICANT CHANGE UP
GLUCOSE BLDC GLUCOMTR-MCNC: 123 MG/DL — HIGH (ref 70–99)
GLUCOSE BLDC GLUCOMTR-MCNC: 123 MG/DL — HIGH (ref 70–99)
GLUCOSE BLDC GLUCOMTR-MCNC: 154 MG/DL — HIGH (ref 70–99)
GLUCOSE BLDC GLUCOMTR-MCNC: 154 MG/DL — HIGH (ref 70–99)
GLUCOSE SERPL-MCNC: 118 MG/DL — HIGH (ref 70–99)
GLUCOSE SERPL-MCNC: 118 MG/DL — HIGH (ref 70–99)
HCT VFR BLD CALC: 29 % — LOW (ref 34.5–45)
HCT VFR BLD CALC: 29 % — LOW (ref 34.5–45)
HGB BLD-MCNC: 8.9 G/DL — LOW (ref 11.5–15.5)
HGB BLD-MCNC: 8.9 G/DL — LOW (ref 11.5–15.5)
MCHC RBC-ENTMCNC: 22 PG — LOW (ref 27–34)
MCHC RBC-ENTMCNC: 22 PG — LOW (ref 27–34)
MCHC RBC-ENTMCNC: 30.7 G/DL — LOW (ref 32–36)
MCHC RBC-ENTMCNC: 30.7 G/DL — LOW (ref 32–36)
MCV RBC AUTO: 71.8 FL — LOW (ref 80–100)
MCV RBC AUTO: 71.8 FL — LOW (ref 80–100)
NRBC # BLD: 0 /100 WBCS — SIGNIFICANT CHANGE UP (ref 0–0)
NRBC # BLD: 0 /100 WBCS — SIGNIFICANT CHANGE UP (ref 0–0)
PLATELET # BLD AUTO: 235 K/UL — SIGNIFICANT CHANGE UP (ref 150–400)
PLATELET # BLD AUTO: 235 K/UL — SIGNIFICANT CHANGE UP (ref 150–400)
POTASSIUM SERPL-MCNC: 3.7 MMOL/L — SIGNIFICANT CHANGE UP (ref 3.5–5.3)
POTASSIUM SERPL-MCNC: 3.7 MMOL/L — SIGNIFICANT CHANGE UP (ref 3.5–5.3)
POTASSIUM SERPL-SCNC: 3.7 MMOL/L — SIGNIFICANT CHANGE UP (ref 3.5–5.3)
POTASSIUM SERPL-SCNC: 3.7 MMOL/L — SIGNIFICANT CHANGE UP (ref 3.5–5.3)
RBC # BLD: 4.04 M/UL — SIGNIFICANT CHANGE UP (ref 3.8–5.2)
RBC # BLD: 4.04 M/UL — SIGNIFICANT CHANGE UP (ref 3.8–5.2)
RBC # FLD: 20 % — HIGH (ref 10.3–14.5)
RBC # FLD: 20 % — HIGH (ref 10.3–14.5)
SODIUM SERPL-SCNC: 138 MMOL/L — SIGNIFICANT CHANGE UP (ref 135–145)
SODIUM SERPL-SCNC: 138 MMOL/L — SIGNIFICANT CHANGE UP (ref 135–145)
WBC # BLD: 6.18 K/UL — SIGNIFICANT CHANGE UP (ref 3.8–10.5)
WBC # BLD: 6.18 K/UL — SIGNIFICANT CHANGE UP (ref 3.8–10.5)
WBC # FLD AUTO: 6.18 K/UL — SIGNIFICANT CHANGE UP (ref 3.8–10.5)
WBC # FLD AUTO: 6.18 K/UL — SIGNIFICANT CHANGE UP (ref 3.8–10.5)

## 2023-11-03 PROCEDURE — 99232 SBSQ HOSP IP/OBS MODERATE 35: CPT

## 2023-11-03 RX ORDER — FUROSEMIDE 40 MG
20 TABLET ORAL DAILY
Refills: 0 | Status: DISCONTINUED | OUTPATIENT
Start: 2023-11-03 | End: 2023-11-07

## 2023-11-04 DIAGNOSIS — I10 ESSENTIAL (PRIMARY) HYPERTENSION: ICD-10-CM

## 2023-11-04 DIAGNOSIS — E11.9 TYPE 2 DIABETES MELLITUS WITHOUT COMPLICATIONS: ICD-10-CM

## 2023-11-04 LAB
GLUCOSE BLDC GLUCOMTR-MCNC: 112 MG/DL — HIGH (ref 70–99)
GLUCOSE BLDC GLUCOMTR-MCNC: 112 MG/DL — HIGH (ref 70–99)

## 2023-11-04 PROCEDURE — 99232 SBSQ HOSP IP/OBS MODERATE 35: CPT

## 2023-11-04 RX ADMIN — CHLORHEXIDINE GLUCONATE 1 APPLICATION(S): 213 SOLUTION TOPICAL at 07:11

## 2023-11-04 RX ADMIN — Medication 20 MILLIGRAM(S): at 07:10

## 2023-11-04 NOTE — PROVIDER CONTACT NOTE (OTHER) - ACTION/TREATMENT ORDERED:
RN attempted to educate patient on risk of not taking medicaitons and non compliance of care. Patient started getting agitated, cursing at RN. States " I want to be left alone".

## 2023-11-05 LAB
-  AMIKACIN: SIGNIFICANT CHANGE UP
-  AMIKACIN: SIGNIFICANT CHANGE UP
-  AMOXICILLIN/CLAVULANIC ACID: SIGNIFICANT CHANGE UP
-  AMOXICILLIN/CLAVULANIC ACID: SIGNIFICANT CHANGE UP
-  AMPICILLIN/SULBACTAM: SIGNIFICANT CHANGE UP
-  AMPICILLIN/SULBACTAM: SIGNIFICANT CHANGE UP
-  AMPICILLIN: SIGNIFICANT CHANGE UP
-  AMPICILLIN: SIGNIFICANT CHANGE UP
-  AZTREONAM: SIGNIFICANT CHANGE UP
-  AZTREONAM: SIGNIFICANT CHANGE UP
-  CEFAZOLIN: SIGNIFICANT CHANGE UP
-  CEFAZOLIN: SIGNIFICANT CHANGE UP
-  CEFEPIME: SIGNIFICANT CHANGE UP
-  CEFEPIME: SIGNIFICANT CHANGE UP
-  CEFOXITIN: SIGNIFICANT CHANGE UP
-  CEFOXITIN: SIGNIFICANT CHANGE UP
-  CEFTRIAXONE: SIGNIFICANT CHANGE UP
-  CEFTRIAXONE: SIGNIFICANT CHANGE UP
-  CEFUROXIME: SIGNIFICANT CHANGE UP
-  CEFUROXIME: SIGNIFICANT CHANGE UP
-  CIPROFLOXACIN: SIGNIFICANT CHANGE UP
-  CIPROFLOXACIN: SIGNIFICANT CHANGE UP
-  ERTAPENEM: SIGNIFICANT CHANGE UP
-  ERTAPENEM: SIGNIFICANT CHANGE UP
-  GENTAMICIN: SIGNIFICANT CHANGE UP
-  GENTAMICIN: SIGNIFICANT CHANGE UP
-  IMIPENEM: SIGNIFICANT CHANGE UP
-  IMIPENEM: SIGNIFICANT CHANGE UP
-  LEVOFLOXACIN: SIGNIFICANT CHANGE UP
-  LEVOFLOXACIN: SIGNIFICANT CHANGE UP
-  MEROPENEM: SIGNIFICANT CHANGE UP
-  MEROPENEM: SIGNIFICANT CHANGE UP
-  NITROFURANTOIN: SIGNIFICANT CHANGE UP
-  NITROFURANTOIN: SIGNIFICANT CHANGE UP
-  PIPERACILLIN/TAZOBACTAM: SIGNIFICANT CHANGE UP
-  PIPERACILLIN/TAZOBACTAM: SIGNIFICANT CHANGE UP
-  TOBRAMYCIN: SIGNIFICANT CHANGE UP
-  TOBRAMYCIN: SIGNIFICANT CHANGE UP
-  TRIMETHOPRIM/SULFAMETHOXAZOLE: SIGNIFICANT CHANGE UP
-  TRIMETHOPRIM/SULFAMETHOXAZOLE: SIGNIFICANT CHANGE UP
CULTURE RESULTS: ABNORMAL
CULTURE RESULTS: ABNORMAL
GLUCOSE BLDC GLUCOMTR-MCNC: 125 MG/DL — HIGH (ref 70–99)
GLUCOSE BLDC GLUCOMTR-MCNC: 125 MG/DL — HIGH (ref 70–99)
GLUCOSE BLDC GLUCOMTR-MCNC: 147 MG/DL — HIGH (ref 70–99)
GLUCOSE BLDC GLUCOMTR-MCNC: 147 MG/DL — HIGH (ref 70–99)
METHOD TYPE: SIGNIFICANT CHANGE UP
METHOD TYPE: SIGNIFICANT CHANGE UP
ORGANISM # SPEC MICROSCOPIC CNT: ABNORMAL
ORGANISM # SPEC MICROSCOPIC CNT: ABNORMAL
ORGANISM # SPEC MICROSCOPIC CNT: SIGNIFICANT CHANGE UP
ORGANISM # SPEC MICROSCOPIC CNT: SIGNIFICANT CHANGE UP
SPECIMEN SOURCE: SIGNIFICANT CHANGE UP
SPECIMEN SOURCE: SIGNIFICANT CHANGE UP

## 2023-11-05 PROCEDURE — 99232 SBSQ HOSP IP/OBS MODERATE 35: CPT

## 2023-11-05 RX ADMIN — Medication 20 MILLIGRAM(S): at 05:03

## 2023-11-05 RX ADMIN — CHLORHEXIDINE GLUCONATE 1 APPLICATION(S): 213 SOLUTION TOPICAL at 05:02

## 2023-11-06 LAB
% ALBUMIN: 38.5 % — SIGNIFICANT CHANGE UP
% ALBUMIN: 38.5 % — SIGNIFICANT CHANGE UP
% ALPHA 1: 4.2 % — SIGNIFICANT CHANGE UP
% ALPHA 1: 4.2 % — SIGNIFICANT CHANGE UP
% ALPHA 2: 9.1 % — SIGNIFICANT CHANGE UP
% ALPHA 2: 9.1 % — SIGNIFICANT CHANGE UP
% BETA: 10.3 % — SIGNIFICANT CHANGE UP
% BETA: 10.3 % — SIGNIFICANT CHANGE UP
% GAMMA: 37.9 % — SIGNIFICANT CHANGE UP
% GAMMA: 37.9 % — SIGNIFICANT CHANGE UP
ALBUMIN SERPL ELPH-MCNC: 3.1 G/DL — LOW (ref 3.6–5.5)
ALBUMIN SERPL ELPH-MCNC: 3.1 G/DL — LOW (ref 3.6–5.5)
ALBUMIN/GLOB SERPL ELPH: 0.6 RATIO — SIGNIFICANT CHANGE UP
ALBUMIN/GLOB SERPL ELPH: 0.6 RATIO — SIGNIFICANT CHANGE UP
ALPHA1 GLOB SERPL ELPH-MCNC: 0.3 G/DL — SIGNIFICANT CHANGE UP (ref 0.1–0.4)
ALPHA1 GLOB SERPL ELPH-MCNC: 0.3 G/DL — SIGNIFICANT CHANGE UP (ref 0.1–0.4)
ALPHA2 GLOB SERPL ELPH-MCNC: 0.7 G/DL — SIGNIFICANT CHANGE UP (ref 0.5–1)
ALPHA2 GLOB SERPL ELPH-MCNC: 0.7 G/DL — SIGNIFICANT CHANGE UP (ref 0.5–1)
B-GLOBULIN SERPL ELPH-MCNC: 0.8 G/DL — SIGNIFICANT CHANGE UP (ref 0.5–1)
B-GLOBULIN SERPL ELPH-MCNC: 0.8 G/DL — SIGNIFICANT CHANGE UP (ref 0.5–1)
GAMMA GLOBULIN: 3.1 G/DL — HIGH (ref 0.6–1.6)
GAMMA GLOBULIN: 3.1 G/DL — HIGH (ref 0.6–1.6)
GLUCOSE BLDC GLUCOMTR-MCNC: 109 MG/DL — HIGH (ref 70–99)
GLUCOSE BLDC GLUCOMTR-MCNC: 109 MG/DL — HIGH (ref 70–99)
GLUCOSE BLDC GLUCOMTR-MCNC: 132 MG/DL — HIGH (ref 70–99)
GLUCOSE BLDC GLUCOMTR-MCNC: 132 MG/DL — HIGH (ref 70–99)
GLUCOSE BLDC GLUCOMTR-MCNC: 183 MG/DL — HIGH (ref 70–99)
GLUCOSE BLDC GLUCOMTR-MCNC: 183 MG/DL — HIGH (ref 70–99)
INTERPRETATION SERPL IFE-IMP: SIGNIFICANT CHANGE UP
INTERPRETATION SERPL IFE-IMP: SIGNIFICANT CHANGE UP
PROT PATTERN SERPL ELPH-IMP: SIGNIFICANT CHANGE UP
PROT PATTERN SERPL ELPH-IMP: SIGNIFICANT CHANGE UP
PROT SERPL-MCNC: 8.1 G/DL — SIGNIFICANT CHANGE UP (ref 6–8.3)
PROT SERPL-MCNC: 8.1 G/DL — SIGNIFICANT CHANGE UP (ref 6–8.3)

## 2023-11-06 PROCEDURE — 99232 SBSQ HOSP IP/OBS MODERATE 35: CPT

## 2023-11-06 RX ADMIN — Medication 20 MILLIGRAM(S): at 05:52

## 2023-11-06 RX ADMIN — CHLORHEXIDINE GLUCONATE 1 APPLICATION(S): 213 SOLUTION TOPICAL at 05:52

## 2023-11-06 NOTE — PROGRESS NOTE ADULT - NUTRITIONAL ASSESSMENT
This patient has been assessed with a concern for Malnutrition and has been determined to have a diagnosis/diagnoses of Morbid obesity (BMI > 40).    This patient is being managed with:   Diet Consistent Carbohydrate w/Evening Snack-  Low Sodium  Supplement Feeding Modality:  Oral  Glucerna Shake Cans or Servings Per Day:  1       Frequency:  Daily  Entered: Nov 2 2023 11:09AM  

## 2023-11-06 NOTE — PROGRESS NOTE ADULT - ASSESSMENT
63F PMH HTN, HLD, DM, b/l chronic lymphedema, morbid obesity, presents with difficulty ambulating.  She had reportedly been at [facility], but left AMA.  Seen by PT, recommending [facility] placement.    # Gait disorder - pt unable to ambulate.  Seen by PT, recommending [facility] placement.  Pt agreeable.  # Chronic Lymphedema - Pt states this is chronic.  Has previously followed with vascular, but refuses any care by RN / staff.  She is performing her own dressing changes.  Low dose diuretics.    # Elevated Total Protein - Labs noted with elevated total protein, low albumin.  Awaiting SPEP specimen in lab.    # Essential HTN - Monitor BP.  Continue antihypertensives.  # Hyperlipidemia - diet modification.  Continue Statin.  # Obesity class 3 – BMI =40 kg/m2- pt counselled on diet and lifestyle modification, gradual weight loss, and activity.   # Microcytic anemia, Iron deficiency Anemia - Fe supplementation.  Pt recalls h/o Sickle cell trait.  Supportive care.  # T2DM - Reports she takes Januvia, cannot recall dose. FS qAC and qHS, insulin protocol if needed  - CC diet    DVT ppx: Heparin    Medically stable for [facility]. 
63F PMH HTN, HLD, DM, b/l chronic lymphedema, morbid obesity, presents with difficulty ambulating.  She had reportedly been at [facility], but left AMA.  Seen by PT, recommending [facility] placement.    # Gait disorder - pt unable to ambulate.  Seen by PT, recommending [facility] placement.  Pt agreeable.  # Chronic Lymphedema - Pt states this is chronic.  Has previously followed with vascular, but refuses any care by RN / staff.  She is performing her own dressing changes.  Low dose diuretics.    # Elevated Total Protein - Labs noted with elevated total protein, low albumin.  Awaiting SPEP specimen in lab.    # Essential HTN - Monitor BP.  Continue antihypertensives.  # Hyperlipidemia - diet modification.  Continue Statin.  # Obesity class 3 – BMI =40 kg/m2- pt counselled on diet and lifestyle modification, gradual weight loss, and activity.   # Microcytic anemia, Iron deficiency Anemia - Fe supplementation.  Pt recalls h/o Sickle cell trait.  Supportive care.  # T2DM - Reports she takes Januvia, cannot recall dose. FS qAC and qHS, insulin protocol if needed  - CC diet    DVT ppx: Heparin      Medically stable for [facility]. 
63F PMH HTN, HLD, DM, b/l chronic lymphedema, morbid obesity, presents with difficulty ambulating.  She had reportedly been at [facility], but left AMA.  Seen by PT, recommending [facility] placement.    # Gait disorder - pt unable to ambulate.  Seen by PT, recommending [facility] placement.  Pt agreeable.  # Elevated Total Protein - SPEP.    # Essential HTN - Monitor BP.  Continue antihypertensives.  # Hyperlipidemia - diet modification.  Continue Statin.  # Obesity class 3 – BMI =40 kg/m2- pt counselled on diet and lifestyle modification, gradual weight loss, and activity.   # Microcytic anemia  - Iron panel  - Reticulocyte count  - B12/folate   # T2DM - Reports she takes Januvia, cannot recall dose. FS qAC and qHS, insulin protocol if needed  - CC diet    DVT ppx: Heparin  GI ppx: Not indicated   Activity: IAT  Diet: CC  Code: Full     Medically stable for [facility]. 
63F PMH HTN, HLD, DM, b/l chronic lymphedema, morbid obesity, presents with difficulty ambulating.  She had reportedly been at [facility], but left AMA.  Seen by PT, recommending [facility] placement.    # Gait disorder - pt unable to ambulate.  Seen by PT, recommending [facility] placement.  Pt agreeable.  # Chronic Lymphedema - Pt states this is chronic.  Has previously followed with vascular, but refuses any care by RN / staff.  She is performing her own dressing changes.  Low dose diuretics.    # Elevated Total Protein - Labs noted with elevated total protein, low albumin.  Awaiting SPEP specimen in lab.    # Essential HTN - Monitor BP.  Continue antihypertensives.  # Hyperlipidemia - diet modification.  Continue Statin.  # Obesity class 3 – BMI =40 kg/m2- pt counselled on diet and lifestyle modification, gradual weight loss, and activity.   # Microcytic anemia, Iron deficiency Anemia - Fe supplementation.  Pt recalls h/o Sickle cell trait.  Supportive care.  # T2DM - Reports she takes Januvia, cannot recall dose. FS qAC and qHS, insulin protocol if needed  - CC diet    DVT ppx: Heparin    Medically stable for [facility]. 
63F PMH HTN, HLD, DM, b/l chronic lymphedema, morbid obesity, presents with difficulty ambulating.  She had reportedly been at [facility], but left AMA.  Seen by PT, recommending [facility] placement.    # Gait disorder - pt unable to ambulate.  Seen by PT, recommending [facility] placement.  Pt agreeable.  # Chronic Lymphedema - Pt states this is chronic.  Has previously followed with vascular, but refuses any care by RN / staff.  She is performing her own dressing changes.  Low dose diuretics.    # Elevated Total Protein - Labs noted with elevated total protein, low albumin.  Awaiting SPEP specimen in lab.    # Essential HTN - Monitor BP.  Continue antihypertensives.  # Hyperlipidemia - diet modification.  Continue Statin.  # Obesity class 3 – BMI =40 kg/m2- pt counselled on diet and lifestyle modification, gradual weight loss, and activity.   # Microcytic anemia, Iron deficiency Anemia - Fe supplementation.  Pt recalls h/o Sickle cell trait.  Supportive care.  # T2DM - Reports she takes Januvia, cannot recall dose. FS qAC and qHS, insulin protocol if needed  - CC diet    DVT ppx: Heparin      Medically stable for [facility].

## 2023-11-06 NOTE — PROGRESS NOTE ADULT - SUBJECTIVE AND OBJECTIVE BOX
Patient: CLARK DENNIS [id number] 63y Female                            Hospitalist Attending Note    No complaints.  No SOB / fever / chills.      ____________________PHYSICAL EXAM:  GENERAL:  NAD Alert and Oriented x 3   HEENT: NCAT  CARDIOVASCULAR:  S1, S2  LUNGS: CTAB  ABDOMEN:  soft, (-) tenderness, (-) distension, (+) bowel sounds, (-) guarding, (-) rebound (-) rigidity  EXTREMITIES:  no cyanosis / clubbing.  ++ edema.   ____________________      VITALS:  Vital Signs Last 24 Hrs  T(C): 36.9 (03 Nov 2023 11:45), Max: 36.9 (03 Nov 2023 11:45)  T(F): 98.5 (03 Nov 2023 11:45), Max: 98.5 (03 Nov 2023 11:45)  HR: 93 (03 Nov 2023 11:45) (93 - 93)  BP: 129/80 (03 Nov 2023 11:45) (129/80 - 129/80)  BP(mean): --  RR: 18 (03 Nov 2023 11:45) (18 - 18)  SpO2: 99% (03 Nov 2023 11:45) (99% - 99%)    Parameters below as of 03 Nov 2023 11:45  Patient On (Oxygen Delivery Method): room air     Daily     Daily   CAPILLARY BLOOD GLUCOSE      POCT Blood Glucose.: 154 mg/dL (03 Nov 2023 10:59)  POCT Blood Glucose.: 123 mg/dL (03 Nov 2023 07:50)    I&O's Summary      LABS:                        8.9    6.18  )-----------( 235      ( 03 Nov 2023 13:55 )             29.0     11-03    138  |  107  |  20  ----------------------------<  118<H>  3.7   |  27  |  0.90    Ca    8.4<L>      03 Nov 2023 13:55          Urinalysis Basic - ( 03 Nov 2023 13:55 )    Color: x / Appearance: x / SG: x / pH: x  Gluc: 118 mg/dL / Ketone: x  / Bili: x / Urobili: x   Blood: x / Protein: x / Nitrite: x   Leuk Esterase: x / RBC: x / WBC x   Sq Epi: x / Non Sq Epi: x / Bacteria: x              MEDICATIONS:  chlorhexidine 2% Cloths 1 Application(s) Topical <User Schedule>  dextrose 5%. 1000 milliLiter(s) IV Continuous <Continuous>  dextrose 5%. 1000 milliLiter(s) IV Continuous <Continuous>  dextrose 50% Injectable 25 Gram(s) IV Push once  dextrose 50% Injectable 12.5 Gram(s) IV Push once  dextrose 50% Injectable 25 Gram(s) IV Push once  dextrose Oral Gel 15 Gram(s) Oral once PRN  glucagon  Injectable 1 milliGRAM(s) IntraMuscular once  heparin   Injectable 5000 Unit(s) SubCutaneous every 8 hours  insulin lispro (ADMELOG) corrective regimen sliding scale   SubCutaneous three times a day before meals  iron sucrose IVPB 100 milliGRAM(s) IV Intermittent every 24 hours    
Patient is a 63y old  Female who presents with a chief complaint of Social admission (04 Nov 2023 11:35)      SUBJECTIVE / OVERNIGHT EVENTS:    No events overnight. This AM, patient without n/v/d/cp/sob.      MEDICATIONS  (STANDING):  chlorhexidine 2% Cloths 1 Application(s) Topical <User Schedule>  dextrose 5%. 1000 milliLiter(s) (50 mL/Hr) IV Continuous <Continuous>  dextrose 5%. 1000 milliLiter(s) (100 mL/Hr) IV Continuous <Continuous>  dextrose 50% Injectable 25 Gram(s) IV Push once  dextrose 50% Injectable 12.5 Gram(s) IV Push once  dextrose 50% Injectable 25 Gram(s) IV Push once  furosemide    Tablet 20 milliGRAM(s) Oral daily  glucagon  Injectable 1 milliGRAM(s) IntraMuscular once  heparin   Injectable 5000 Unit(s) SubCutaneous every 8 hours  insulin lispro (ADMELOG) corrective regimen sliding scale   SubCutaneous three times a day before meals  iron sucrose IVPB 100 milliGRAM(s) IV Intermittent every 24 hours    MEDICATIONS  (PRN):  dextrose Oral Gel 15 Gram(s) Oral once PRN Blood Glucose LESS THAN 70 milliGRAM(s)/deciliter      PHYSICAL EXAM:  T(C): 36.4 (11-05-23 @ 05:26), Max: 36.4 (11-05-23 @ 05:26)  HR: 79 (11-05-23 @ 05:26) (79 - 79)  BP: 143/82 (11-05-23 @ 05:26) (143/82 - 143/82)  RR: 20 (11-05-23 @ 05:26) (20 - 20)  SpO2: 98% (11-05-23 @ 05:26) (98% - 98%)  I&O's Summary    GENERAL: NAD, lying in bed  HEAD:  Atraumatic, Normocephalic, MMM  CHEST/LUNG: No use of accessory muscles, CTAB, breathing non-labored  COR: RR, no mrcg  ABD: Soft, ND/NT, +BS  PSYCH: AAOx3  NEUROLOGY: CN II-XII grossly intact, moving all extremities  SKIN: No rashes or lesions  EXT: wwp, no cc, chronic lymphedema, wrapped in ACE bandages c/d/i    LABS:  CAPILLARY BLOOD GLUCOSE      POCT Blood Glucose.: 125 mg/dL (05 Nov 2023 07:56)                          8.9    6.18  )-----------( 235      ( 03 Nov 2023 13:55 )             29.0     11-03    138  |  107  |  20  ----------------------------<  118<H>  3.7   |  27  |  0.90    Ca    8.4<L>      03 Nov 2023 13:55            Urinalysis Basic - ( 03 Nov 2023 13:55 )    Color: x / Appearance: x / SG: x / pH: x  Gluc: 118 mg/dL / Ketone: x  / Bili: x / Urobili: x   Blood: x / Protein: x / Nitrite: x   Leuk Esterase: x / RBC: x / WBC x   Sq Epi: x / Non Sq Epi: x / Bacteria: x          RADIOLOGY & ADDITIONAL TESTS:    Telemetry Personally Reviewed -     Imaging Personally Reviewed -     Imaging Reviewed -     Consultant(s) Notes Reviewed -       Care Discussed with Consultants/Other Providers - 
                          Patient: CLARK DENNIS [id number] 63y Female                            Hospitalist Attending Note    No complaints.  No SOB / fever / chills.      ____________________PHYSICAL EXAM:  GENERAL:  NAD Alert and Oriented x 3   HEENT: NCAT  CARDIOVASCULAR:  S1, S2  LUNGS: CTAB  ABDOMEN:  soft, (-) tenderness, (-) distension, (+) bowel sounds, (-) guarding, (-) rebound (-) rigidity  EXTREMITIES:  no cyanosis / clubbing.  ++ edema.   ____________________       VITALS:  Vital Signs Last 24 Hrs  T(C): 36.9 (31 Oct 2023 22:47), Max: 36.9 (31 Oct 2023 22:47)  T(F): 98.4 (31 Oct 2023 22:47), Max: 98.4 (31 Oct 2023 22:47)  HR: 87 (31 Oct 2023 22:47) (87 - 87)  BP: 152/70 (31 Oct 2023 22:47) (152/70 - 152/70)  BP(mean): --  RR: 18 (31 Oct 2023 22:47) (18 - 18)  SpO2: 99% (31 Oct 2023 22:47) (99% - 99%)    Parameters below as of 31 Oct 2023 22:47  Patient On (Oxygen Delivery Method): room air     Daily     Daily   CAPILLARY BLOOD GLUCOSE      POCT Blood Glucose.: 144 mg/dL (01 Nov 2023 08:07)    I&O's Summary      HISTORY:  PAST MEDICAL & SURGICAL HISTORY:  DM (diabetes mellitus)      HTN (hypertension)      Allergies    No Known Allergies    Intolerances       LABS:                        8.6    6.12  )-----------( 236      ( 01 Nov 2023 08:20 )             27.9     11-01    140  |  106  |  16  ----------------------------<  137<H>  3.6   |  27  |  0.92    Ca    8.1<L>      01 Nov 2023 08:20  Mg     2.1     11-01    TPro  8.4<H>  /  Alb  2.6<L>  /  TBili  0.5  /  DBili  x   /  AST  16  /  ALT  26  /  AlkPhos  104  11-01      LIVER FUNCTIONS - ( 01 Nov 2023 08:20 )  Alb: 2.6 g/dL / Pro: 8.4 gm/dL / ALK PHOS: 104 U/L / ALT: 26 U/L / AST: 16 U/L / GGT: x           Urinalysis Basic - ( 01 Nov 2023 08:20 )    Color: x / Appearance: x / SG: x / pH: x  Gluc: 137 mg/dL / Ketone: x  / Bili: x / Urobili: x   Blood: x / Protein: x / Nitrite: x   Leuk Esterase: x / RBC: x / WBC x   Sq Epi: x / Non Sq Epi: x / Bacteria: x              MEDICATIONS:  MEDICATIONS  (STANDING):  chlorhexidine 2% Cloths 1 Application(s) Topical <User Schedule>  dextrose 5%. 1000 milliLiter(s) (100 mL/Hr) IV Continuous <Continuous>  dextrose 5%. 1000 milliLiter(s) (50 mL/Hr) IV Continuous <Continuous>  dextrose 50% Injectable 25 Gram(s) IV Push once  dextrose 50% Injectable 25 Gram(s) IV Push once  dextrose 50% Injectable 12.5 Gram(s) IV Push once  glucagon  Injectable 1 milliGRAM(s) IntraMuscular once  heparin   Injectable 5000 Unit(s) SubCutaneous every 8 hours  insulin lispro (ADMELOG) corrective regimen sliding scale   SubCutaneous three times a day before meals    MEDICATIONS  (PRN):  dextrose Oral Gel 15 Gram(s) Oral once PRN Blood Glucose LESS THAN 70 milliGRAM(s)/deciliter  
Patient is a 63y old  Female who presents with a chief complaint of Social admission (03 Nov 2023 15:32)    SUBJECTIVE / OVERNIGHT EVENTS:    No events overnight. This AM, patient without n/v/d/cp/sob.  Patient expresses frustration about waiting for [facility].    MEDICATIONS  (STANDING):  chlorhexidine 2% Cloths 1 Application(s) Topical <User Schedule>  dextrose 5%. 1000 milliLiter(s) (50 mL/Hr) IV Continuous <Continuous>  dextrose 5%. 1000 milliLiter(s) (100 mL/Hr) IV Continuous <Continuous>  dextrose 50% Injectable 25 Gram(s) IV Push once  dextrose 50% Injectable 12.5 Gram(s) IV Push once  dextrose 50% Injectable 25 Gram(s) IV Push once  furosemide    Tablet 20 milliGRAM(s) Oral daily  glucagon  Injectable 1 milliGRAM(s) IntraMuscular once  heparin   Injectable 5000 Unit(s) SubCutaneous every 8 hours  insulin lispro (ADMELOG) corrective regimen sliding scale   SubCutaneous three times a day before meals  iron sucrose IVPB 100 milliGRAM(s) IV Intermittent every 24 hours    MEDICATIONS  (PRN):  dextrose Oral Gel 15 Gram(s) Oral once PRN Blood Glucose LESS THAN 70 milliGRAM(s)/deciliter      PHYSICAL EXAM:  T(C): 36.9 (11-03-23 @ 11:45), Max: 36.9 (11-03-23 @ 11:45)  HR: 93 (11-03-23 @ 11:45) (93 - 93)  BP: 129/80 (11-03-23 @ 11:45) (129/80 - 129/80)  RR: 18 (11-03-23 @ 11:45) (18 - 18)  SpO2: 99% (11-03-23 @ 11:45) (99% - 99%)  I&O's Summary    03 Nov 2023 07:01  -  04 Nov 2023 07:00  --------------------------------------------------------  IN: 760 mL / OUT: 0 mL / NET: 760 mL      GENERAL: NAD, seated in chair   HEAD:  Atraumatic, Normocephalic, MMM  CHEST/LUNG: No use of accessory muscles, CTAB, breathing non-labored  COR: RR, no mrcg  ABD: Soft, ND/NT, +BS  PSYCH: AAOx3  NEUROLOGY: CN II-XII grossly intact, moving all extremities  SKIN: No rashes or lesions  EXT: wwp, chronic lymphedema, legs in bandages b/l, c/d/i    LABS:  CAPILLARY BLOOD GLUCOSE      POCT Blood Glucose.: 112 mg/dL (04 Nov 2023 08:17)                          8.9    6.18  )-----------( 235      ( 03 Nov 2023 13:55 )             29.0     11-03    138  |  107  |  20  ----------------------------<  118<H>  3.7   |  27  |  0.90    Ca    8.4<L>      03 Nov 2023 13:55            Urinalysis Basic - ( 03 Nov 2023 13:55 )    Color: x / Appearance: x / SG: x / pH: x  Gluc: 118 mg/dL / Ketone: x  / Bili: x / Urobili: x   Blood: x / Protein: x / Nitrite: x   Leuk Esterase: x / RBC: x / WBC x   Sq Epi: x / Non Sq Epi: x / Bacteria: x          RADIOLOGY & ADDITIONAL TESTS:    Telemetry Personally Reviewed -     Imaging Personally Reviewed -     Imaging Reviewed -     Consultant(s) Notes Reviewed -       Care Discussed with Consultants/Other Providers - 
Patient is a 63y old  Female who presents with a chief complaint of Social admission (05 Nov 2023 10:50)      INTERVAL HPI/OVERNIGHT EVENTS: Overnight no acute events.     MEDICATIONS  (STANDING):  chlorhexidine 2% Cloths 1 Application(s) Topical <User Schedule>  dextrose 5%. 1000 milliLiter(s) (50 mL/Hr) IV Continuous <Continuous>  dextrose 5%. 1000 milliLiter(s) (100 mL/Hr) IV Continuous <Continuous>  dextrose 50% Injectable 25 Gram(s) IV Push once  dextrose 50% Injectable 12.5 Gram(s) IV Push once  dextrose 50% Injectable 25 Gram(s) IV Push once  furosemide    Tablet 20 milliGRAM(s) Oral daily  glucagon  Injectable 1 milliGRAM(s) IntraMuscular once  heparin   Injectable 5000 Unit(s) SubCutaneous every 8 hours  insulin lispro (ADMELOG) corrective regimen sliding scale   SubCutaneous three times a day before meals  iron sucrose IVPB 100 milliGRAM(s) IV Intermittent every 24 hours    MEDICATIONS  (PRN):  dextrose Oral Gel 15 Gram(s) Oral once PRN Blood Glucose LESS THAN 70 milliGRAM(s)/deciliter      Allergies    No Known Allergies    Intolerances        REVIEW OF SYSTEMS:  10 point ROS negative, unless stated otherwise.    Vital Signs Last 24 Hrs  T(C): 37 (06 Nov 2023 13:59), Max: 37 (06 Nov 2023 13:59)  T(F): 98.6 (06 Nov 2023 13:59), Max: 98.6 (06 Nov 2023 13:59)  HR: 92 (06 Nov 2023 13:59) (78 - 92)  BP: 149/86 (06 Nov 2023 13:59) (117/72 - 149/86)  BP(mean): --  RR: 20 (06 Nov 2023 13:59) (18 - 20)  SpO2: 98% (06 Nov 2023 13:59) (98% - 100%)    Parameters below as of 06 Nov 2023 13:59  Patient On (Oxygen Delivery Method): room air        PHYSICAL EXAM:  GENERAL: NAD, sitting in chair  HEAD:  Atraumatic, Normocephalic, MMM  CHEST/LUNG: No use of accessory muscles, CTAB, breathing non-labored  COR: RR, no mrcg  ABD: Soft, ND/NT, +BS  PSYCH: AAOx3  NEUROLOGY: CN II-XII grossly intact, moving all extremities  SKIN: No rashes or lesions  EXT: wwp, no cc, chronic lymphedema, wrapped in ACE bandages c/d/i    LABS:              CAPILLARY BLOOD GLUCOSE      POCT Blood Glucose.: 183 mg/dL (06 Nov 2023 11:25)  POCT Blood Glucose.: 109 mg/dL (06 Nov 2023 07:44)  POCT Blood Glucose.: 147 mg/dL (05 Nov 2023 16:25)      RADIOLOGY & ADDITIONAL TESTS:    Imaging Personally Reviewed:  [ X] YES  [ ] NO    Consultant(s) Notes Reviewed:  [ X] YES  [ ] NO    Care Discussed with Consultants/Other Providers [X ] YES  [ ] NO

## 2023-11-07 ENCOUNTER — INPATIENT (INPATIENT)
Facility: HOSPITAL | Age: 63
LOS: 5 days | Discharge: NURSING, REHAB OR LONG-TERM CARE | End: 2023-11-13
Attending: STUDENT IN AN ORGANIZED HEALTH CARE EDUCATION/TRAINING PROGRAM | Admitting: STUDENT IN AN ORGANIZED HEALTH CARE EDUCATION/TRAINING PROGRAM
Payer: SELF-PAY

## 2023-11-07 ENCOUNTER — TRANSCRIPTION ENCOUNTER (OUTPATIENT)
Age: 63
End: 2023-11-07

## 2023-11-07 VITALS
DIASTOLIC BLOOD PRESSURE: 71 MMHG | RESPIRATION RATE: 18 BRPM | TEMPERATURE: 98 F | HEART RATE: 78 BPM | SYSTOLIC BLOOD PRESSURE: 112 MMHG | OXYGEN SATURATION: 97 %

## 2023-11-07 VITALS
SYSTOLIC BLOOD PRESSURE: 156 MMHG | TEMPERATURE: 98 F | OXYGEN SATURATION: 100 % | DIASTOLIC BLOOD PRESSURE: 87 MMHG | HEART RATE: 88 BPM | RESPIRATION RATE: 16 BRPM

## 2023-11-07 PROCEDURE — 99285 EMERGENCY DEPT VISIT HI MDM: CPT

## 2023-11-07 PROCEDURE — 99239 HOSP IP/OBS DSCHRG MGMT >30: CPT

## 2023-11-07 RX ORDER — FUROSEMIDE 40 MG
1 TABLET ORAL
Qty: 0 | Refills: 0 | DISCHARGE
Start: 2023-11-07

## 2023-11-07 RX ORDER — ACETAMINOPHEN 500 MG
650 TABLET ORAL ONCE
Refills: 0 | Status: COMPLETED | OUTPATIENT
Start: 2023-11-07 | End: 2023-11-07

## 2023-11-07 RX ADMIN — Medication 650 MILLIGRAM(S): at 05:00

## 2023-11-07 RX ADMIN — Medication 650 MILLIGRAM(S): at 04:06

## 2023-11-07 NOTE — ED ADULT NURSE NOTE - CHIEF COMPLAINT QUOTE
Patient brought in by EMS for aggressive behavior. Pt. went to [facility] for rehab, upon arrival pt. refused to shower, staff called 911. Pt. calm and cooperative at present. Denies SI/HI. Offers no complaints.  PMH HTN, DM type 2, lymphedema.

## 2023-11-07 NOTE — DISCHARGE NOTE NURSING/CASE MANAGEMENT/SOCIAL WORK - PATIENT PORTAL LINK FT
You can access the FollowMyHealth Patient Portal offered by [facility] by registering at the following website: [email or web address]. By joining [facility]’s FollowMyHealth portal, you will also be able to view your health information using other applications (apps) compatible with our system.

## 2023-11-07 NOTE — ED PROVIDER NOTE - NSICDXPASTMEDICALHX_GEN_ALL_CORE_FT
PAST MEDICAL HISTORY:  Adult-Onset Obesity     Chronic Acquired Lymphedema     Iron Deficiency Anemia

## 2023-11-07 NOTE — ED ADULT TRIAGE NOTE - BP NONINVASIVE DIASTOLIC (MM HG)
"Subjective:       Patient ID: Terry Wu [id number] [employer]. [address] [place] [address], [place] [address]. All rights reserved. This information is not intended as a substitute for professional medical care. Always follow your healthcare professional's instructions.      Please follow up with your Primary care provider within 2-5 days if your signs and symptoms have not resolved or worsen.     If your condition worsens or fails to improve we recommend that you receive another evaluation at the emergency room immediately or contact your primary medical clinic to discuss your " concerns.   You must understand that you have received an Urgent Care treatment only and that you may be released before all of your medical problems are known or treated. You, the patient, will arrange for follow up care as instructed.     RED FLAGS/WARNING SYMPTOMS DISCUSSED WITH PATIENT THAT WOULD WARRANT EMERGENT MEDICAL ATTENTION. PATIENT VERBALIZED UNDERSTANDING.

## 2023-11-07 NOTE — ED PROVIDER NOTE - CLINICAL SUMMARY MEDICAL DECISION MAKING FREE TEXT BOX
Senthil, PGY3 - 63-year-old woman presenting for rehab placement after refusing her current rehab due to patient requiring less services than what they were trying to enforce on her.  Labs, social work states that patient will need to be readmitted for rehab replacement. TBA. *The above represents an initial assessment/impression. Please refer to progress notes for potential changes in patient clinical course*

## 2023-11-07 NOTE — DISCHARGE NOTE PROVIDER - HOSPITAL COURSE
63F PMH HTN, HLD, DM, b/l chronic lymphedema, morbid obesity, presents with difficulty ambulating.  She had reportedly been at [facility], but left AMA.  Seen by PT, recommending [facility]OMER placement.    # Gait disorder - pt unable to ambulate.  Seen by PT, recommending HOMER placement.  Pt agreeable.  # Chronic Lymphedema - Pt states this is chronic.  Has previously followed with vascular, but refuses any care by RN / staff.  She is performing her own dressing changes.  Low dose diuretics.    # Elevated Total Protein - Labs noted with elevated total protein, low albumin.  Awaiting SPEP specimen in lab.    # Essential HTN - Monitor BP.  Continue antihypertensives.  # Hyperlipidemia - diet modification.  Continue Statin.  # Obesity class 3 – BMI =40 kg/m2- pt counselled on diet and lifestyle modification, gradual weight loss, and activity.   # Microcytic anemia, Iron deficiency Anemia - Fe supplementation.  Pt recalls h/o Sickle cell trait.  Supportive care.  # T2DM - Reports she takes Januvia, cannot recall dose. FS qAC and qHS, insulin protocol if needed  - CC diet    DVT ppx: Heparin    Patient seen and evaluated. Medically stable for [facility].

## 2023-11-07 NOTE — ED ADULT NURSE NOTE - OBJECTIVE STATEMENT
Pt. presents to room 22 sent in from rehab for agitation and aggression with staff members. Pt,. was d/c'ed from hospital today for rehab and wound care. Pt. has lymphadenoma and  wound to RLE which drains foul smelling drainage. Leg currently wrapped with ACE bandage c/d/i. denies CP SOB abd pain n/v/d dysuria fever/chills. LAC20G labs sent. TBA for social work

## 2023-11-07 NOTE — ED ADULT TRIAGE NOTE - CHIEF COMPLAINT QUOTE
Patient brought in by EMS for aggressive behavior. Pt. went to [place] for rehab, upon arrival pt. refused to shower, staff called 911. Pt. calm and cooperative at present. Denies SI/HI. Offers no complaints.  PMH HTN, DM type 2, lymphedema.

## 2023-11-07 NOTE — ED PROVIDER NOTE - ATTENDING CONTRIBUTION TO CARE
64 yo F hx HTN, HLD, DM2, chronic b/l LE edema, discharged from [facility] to rehab, and sent to ED for admission. Pt states she was sent to a rehab for patients who are bedbound and require more care, and she is able to fulfill her ADLs. Appreciate sw consult, will need to admit for placement. On exam, pt appears unkempt, walking at baseline with rolleraid, unlabored breathing, b/l edema, mentating appropriately. Plan for basic labs and admit

## 2023-11-07 NOTE — ED PROVIDER NOTE - PHYSICAL EXAMINATION
Gen: NAD, AOx3, able to make needs known, non-toxic  Head: NCAT  HEENT: EOMI, normal conjunctiva  Lung: no respiratory distress, speaking in full sentences  CV: pulses bilaterally   MSK: no visible bony deformities  Neuro: No focal sensory or motor deficits  Skin: Warm, well perfused, no rash  Psych: normal affect

## 2023-11-07 NOTE — ED PROVIDER NOTE - OBJECTIVE STATEMENT
64yo woman with PMH HTN, HLD, DM, b/l chronic lymphedema, morbid obesity, presented to [place] for difficulty ambulating and was discharged to [facility], presenting due to not wanting to stay in a rehab center that is for dependent individuals.

## 2023-11-07 NOTE — DISCHARGE NOTE PROVIDER - NSDCCPCAREPLAN_GEN_ALL_CORE_FT
PRINCIPAL DISCHARGE DIAGNOSIS  Diagnosis: Swelling of lower extremity  Assessment and Plan of Treatment: Chronic lymphadema   Continue dressing changes as instructed.   Elevate legs as able  Continue lasix   Follow up with PCP      SECONDARY DISCHARGE DIAGNOSES  Diagnosis: HTN (hypertension)  Assessment and Plan of Treatment: Check BP regularly   Adhere to DASH diet    Diagnosis: DM (diabetes mellitus)  Assessment and Plan of Treatment: Diabetic diet   Follow up with PCP

## 2023-11-08 DIAGNOSIS — I10 ESSENTIAL (PRIMARY) HYPERTENSION: ICD-10-CM

## 2023-11-08 DIAGNOSIS — R26.2 DIFFICULTY IN WALKING, NOT ELSEWHERE CLASSIFIED: ICD-10-CM

## 2023-11-08 DIAGNOSIS — E78.5 HYPERLIPIDEMIA, UNSPECIFIED: ICD-10-CM

## 2023-11-08 DIAGNOSIS — E11.9 TYPE 2 DIABETES MELLITUS WITHOUT COMPLICATIONS: ICD-10-CM

## 2023-11-08 DIAGNOSIS — I89.0 LYMPHEDEMA, NOT ELSEWHERE CLASSIFIED: ICD-10-CM

## 2023-11-08 DIAGNOSIS — Z29.9 ENCOUNTER FOR PROPHYLACTIC MEASURES, UNSPECIFIED: ICD-10-CM

## 2023-11-08 DIAGNOSIS — D64.9 ANEMIA, UNSPECIFIED: ICD-10-CM

## 2023-11-08 LAB
ALBUMIN SERPL ELPH-MCNC: 3.4 G/DL — SIGNIFICANT CHANGE UP (ref 3.3–5)
ALBUMIN SERPL ELPH-MCNC: 3.4 G/DL — SIGNIFICANT CHANGE UP (ref 3.3–5)
ALP SERPL-CCNC: 108 U/L — SIGNIFICANT CHANGE UP (ref 40–120)
ALP SERPL-CCNC: 108 U/L — SIGNIFICANT CHANGE UP (ref 40–120)
ALT FLD-CCNC: 18 U/L — SIGNIFICANT CHANGE UP (ref 4–33)
ALT FLD-CCNC: 18 U/L — SIGNIFICANT CHANGE UP (ref 4–33)
ANION GAP SERPL CALC-SCNC: 13 MMOL/L — SIGNIFICANT CHANGE UP (ref 7–14)
ANION GAP SERPL CALC-SCNC: 13 MMOL/L — SIGNIFICANT CHANGE UP (ref 7–14)
ANISOCYTOSIS BLD QL: SIGNIFICANT CHANGE UP
ANISOCYTOSIS BLD QL: SIGNIFICANT CHANGE UP
AST SERPL-CCNC: 30 U/L — SIGNIFICANT CHANGE UP (ref 4–32)
AST SERPL-CCNC: 30 U/L — SIGNIFICANT CHANGE UP (ref 4–32)
BASOPHILS # BLD AUTO: 0 K/UL — SIGNIFICANT CHANGE UP (ref 0–0.2)
BASOPHILS # BLD AUTO: 0 K/UL — SIGNIFICANT CHANGE UP (ref 0–0.2)
BASOPHILS NFR BLD AUTO: 0 % — SIGNIFICANT CHANGE UP (ref 0–2)
BASOPHILS NFR BLD AUTO: 0 % — SIGNIFICANT CHANGE UP (ref 0–2)
BILIRUB SERPL-MCNC: 0.4 MG/DL — SIGNIFICANT CHANGE UP (ref 0.2–1.2)
BILIRUB SERPL-MCNC: 0.4 MG/DL — SIGNIFICANT CHANGE UP (ref 0.2–1.2)
BUN SERPL-MCNC: 28 MG/DL — HIGH (ref 7–23)
BUN SERPL-MCNC: 28 MG/DL — HIGH (ref 7–23)
CALCIUM SERPL-MCNC: 8.9 MG/DL — SIGNIFICANT CHANGE UP (ref 8.4–10.5)
CALCIUM SERPL-MCNC: 8.9 MG/DL — SIGNIFICANT CHANGE UP (ref 8.4–10.5)
CHLORIDE SERPL-SCNC: 101 MMOL/L — SIGNIFICANT CHANGE UP (ref 98–107)
CHLORIDE SERPL-SCNC: 101 MMOL/L — SIGNIFICANT CHANGE UP (ref 98–107)
CO2 SERPL-SCNC: 20 MMOL/L — LOW (ref 22–31)
CO2 SERPL-SCNC: 20 MMOL/L — LOW (ref 22–31)
CREAT SERPL-MCNC: 0.95 MG/DL — SIGNIFICANT CHANGE UP (ref 0.5–1.3)
CREAT SERPL-MCNC: 0.95 MG/DL — SIGNIFICANT CHANGE UP (ref 0.5–1.3)
EGFR: 68 ML/MIN/1.73M2 — SIGNIFICANT CHANGE UP
EGFR: 68 ML/MIN/1.73M2 — SIGNIFICANT CHANGE UP
EOSINOPHIL # BLD AUTO: 0.12 K/UL — SIGNIFICANT CHANGE UP (ref 0–0.5)
EOSINOPHIL # BLD AUTO: 0.12 K/UL — SIGNIFICANT CHANGE UP (ref 0–0.5)
EOSINOPHIL NFR BLD AUTO: 1.8 % — SIGNIFICANT CHANGE UP (ref 0–6)
EOSINOPHIL NFR BLD AUTO: 1.8 % — SIGNIFICANT CHANGE UP (ref 0–6)
GLUCOSE SERPL-MCNC: 136 MG/DL — HIGH (ref 70–99)
GLUCOSE SERPL-MCNC: 136 MG/DL — HIGH (ref 70–99)
HCT VFR BLD CALC: 30.2 % — LOW (ref 34.5–45)
HCT VFR BLD CALC: 30.2 % — LOW (ref 34.5–45)
HGB BLD-MCNC: 9.3 G/DL — LOW (ref 11.5–15.5)
HGB BLD-MCNC: 9.3 G/DL — LOW (ref 11.5–15.5)
IANC: 3.86 K/UL — SIGNIFICANT CHANGE UP (ref 1.8–7.4)
IANC: 3.86 K/UL — SIGNIFICANT CHANGE UP (ref 1.8–7.4)
LYMPHOCYTES # BLD AUTO: 1.69 K/UL — SIGNIFICANT CHANGE UP (ref 1–3.3)
LYMPHOCYTES # BLD AUTO: 1.69 K/UL — SIGNIFICANT CHANGE UP (ref 1–3.3)
LYMPHOCYTES # BLD AUTO: 24.8 % — SIGNIFICANT CHANGE UP (ref 13–44)
LYMPHOCYTES # BLD AUTO: 24.8 % — SIGNIFICANT CHANGE UP (ref 13–44)
MANUAL SMEAR VERIFICATION: SIGNIFICANT CHANGE UP
MANUAL SMEAR VERIFICATION: SIGNIFICANT CHANGE UP
MCHC RBC-ENTMCNC: 21.8 PG — LOW (ref 27–34)
MCHC RBC-ENTMCNC: 21.8 PG — LOW (ref 27–34)
MCHC RBC-ENTMCNC: 30.8 GM/DL — LOW (ref 32–36)
MCHC RBC-ENTMCNC: 30.8 GM/DL — LOW (ref 32–36)
MCV RBC AUTO: 70.9 FL — LOW (ref 80–100)
MCV RBC AUTO: 70.9 FL — LOW (ref 80–100)
MICROCYTES BLD QL: SIGNIFICANT CHANGE UP
MICROCYTES BLD QL: SIGNIFICANT CHANGE UP
MONOCYTES # BLD AUTO: 0.48 K/UL — SIGNIFICANT CHANGE UP (ref 0–0.9)
MONOCYTES # BLD AUTO: 0.48 K/UL — SIGNIFICANT CHANGE UP (ref 0–0.9)
MONOCYTES NFR BLD AUTO: 7.1 % — SIGNIFICANT CHANGE UP (ref 2–14)
MONOCYTES NFR BLD AUTO: 7.1 % — SIGNIFICANT CHANGE UP (ref 2–14)
NEUTROPHILS # BLD AUTO: 4.34 K/UL — SIGNIFICANT CHANGE UP (ref 1.8–7.4)
NEUTROPHILS # BLD AUTO: 4.34 K/UL — SIGNIFICANT CHANGE UP (ref 1.8–7.4)
NEUTROPHILS NFR BLD AUTO: 63.7 % — SIGNIFICANT CHANGE UP (ref 43–77)
NEUTROPHILS NFR BLD AUTO: 63.7 % — SIGNIFICANT CHANGE UP (ref 43–77)
OVALOCYTES BLD QL SMEAR: SLIGHT — SIGNIFICANT CHANGE UP
OVALOCYTES BLD QL SMEAR: SLIGHT — SIGNIFICANT CHANGE UP
PLAT MORPH BLD: NORMAL — SIGNIFICANT CHANGE UP
PLAT MORPH BLD: NORMAL — SIGNIFICANT CHANGE UP
PLATELET # BLD AUTO: 308 K/UL — SIGNIFICANT CHANGE UP (ref 150–400)
PLATELET # BLD AUTO: 308 K/UL — SIGNIFICANT CHANGE UP (ref 150–400)
PLATELET COUNT - ESTIMATE: NORMAL — SIGNIFICANT CHANGE UP
PLATELET COUNT - ESTIMATE: NORMAL — SIGNIFICANT CHANGE UP
POIKILOCYTOSIS BLD QL AUTO: SLIGHT — SIGNIFICANT CHANGE UP
POIKILOCYTOSIS BLD QL AUTO: SLIGHT — SIGNIFICANT CHANGE UP
POTASSIUM SERPL-MCNC: 4.6 MMOL/L — SIGNIFICANT CHANGE UP (ref 3.5–5.3)
POTASSIUM SERPL-MCNC: 4.6 MMOL/L — SIGNIFICANT CHANGE UP (ref 3.5–5.3)
POTASSIUM SERPL-SCNC: 4.6 MMOL/L — SIGNIFICANT CHANGE UP (ref 3.5–5.3)
POTASSIUM SERPL-SCNC: 4.6 MMOL/L — SIGNIFICANT CHANGE UP (ref 3.5–5.3)
PROT SERPL-MCNC: 8.7 G/DL — HIGH (ref 6–8.3)
PROT SERPL-MCNC: 8.7 G/DL — HIGH (ref 6–8.3)
RBC # BLD: 4.26 M/UL — SIGNIFICANT CHANGE UP (ref 3.8–5.2)
RBC # BLD: 4.26 M/UL — SIGNIFICANT CHANGE UP (ref 3.8–5.2)
RBC # FLD: 19.9 % — HIGH (ref 10.3–14.5)
RBC # FLD: 19.9 % — HIGH (ref 10.3–14.5)
RBC BLD AUTO: ABNORMAL
RBC BLD AUTO: ABNORMAL
SODIUM SERPL-SCNC: 134 MMOL/L — LOW (ref 135–145)
SODIUM SERPL-SCNC: 134 MMOL/L — LOW (ref 135–145)
VARIANT LYMPHS # BLD: 2.6 % — SIGNIFICANT CHANGE UP (ref 0–6)
VARIANT LYMPHS # BLD: 2.6 % — SIGNIFICANT CHANGE UP (ref 0–6)
WBC # BLD: 6.82 K/UL — SIGNIFICANT CHANGE UP (ref 3.8–10.5)
WBC # BLD: 6.82 K/UL — SIGNIFICANT CHANGE UP (ref 3.8–10.5)
WBC # FLD AUTO: 6.82 K/UL — SIGNIFICANT CHANGE UP (ref 3.8–10.5)
WBC # FLD AUTO: 6.82 K/UL — SIGNIFICANT CHANGE UP (ref 3.8–10.5)

## 2023-11-08 PROCEDURE — 99223 1ST HOSP IP/OBS HIGH 75: CPT

## 2023-11-08 RX ORDER — INSULIN LISPRO 100/ML
VIAL (ML) SUBCUTANEOUS
Refills: 0 | Status: DISCONTINUED | OUTPATIENT
Start: 2023-11-08 | End: 2023-11-13

## 2023-11-08 RX ORDER — FUROSEMIDE 40 MG
1 TABLET ORAL
Refills: 0 | DISCHARGE

## 2023-11-08 RX ORDER — HEPARIN SODIUM 5000 [USP'U]/ML
5000 INJECTION INTRAVENOUS; SUBCUTANEOUS EVERY 8 HOURS
Refills: 0 | Status: DISCONTINUED | OUTPATIENT
Start: 2023-11-08 | End: 2023-11-13

## 2023-11-08 RX ORDER — FUROSEMIDE 40 MG
20 TABLET ORAL DAILY
Refills: 0 | Status: DISCONTINUED | OUTPATIENT
Start: 2023-11-08 | End: 2023-11-13

## 2023-11-08 RX ORDER — DEXTROSE 50 % IN WATER 50 %
25 SYRINGE (ML) INTRAVENOUS ONCE
Refills: 0 | Status: DISCONTINUED | OUTPATIENT
Start: 2023-11-08 | End: 2023-11-13

## 2023-11-08 RX ORDER — INSULIN LISPRO 100/ML
VIAL (ML) SUBCUTANEOUS AT BEDTIME
Refills: 0 | Status: DISCONTINUED | OUTPATIENT
Start: 2023-11-08 | End: 2023-11-13

## 2023-11-08 RX ADMIN — HEPARIN SODIUM 5000 UNIT(S): 5000 INJECTION INTRAVENOUS; SUBCUTANEOUS at 16:01

## 2023-11-08 NOTE — PHYSICAL THERAPY INITIAL EVALUATION ADULT - GENERAL OBSERVATIONS, REHAB EVAL
Pt encountered ambulating in SURG room to bathroom, no distress, AxOx4, with +IV. Pt agreeable to participate in PT evaluation.

## 2023-11-08 NOTE — PHARMACOTHERAPY INTERVENTION NOTE - COMMENTS
Medication history is incomplete. Medication list updated in Outpatient Medication Record (OMR) per [facility]. Patient unable to verify current medications. Patient gave [facility] [phone] as her current pharmacy, this pharmacy had no dispensing history for this patient. Patient also used [facility] [phone] prior to [facility], updated OMR with prescription history from [facility] dispensed July 2023.  Please call spectra [phone] if you have any questions.   Spoke with ACP provider to notify OMR updated.  Medication history is incomplete. Medication list updated in Outpatient Medication Record (OMR) per [facility]. Patient unable to verify current medications. Patient gave [facility] [phone] as her current pharmacy, this pharmacy had no dispensing history for this patient. Patient also used [facility] [phone] prior to [facility], updated OMR with dispensing history from [facility] as of July 2023.  Please call spectra [phone] if you have any questions.   Spoke with ACP provider to notify OMR updated.

## 2023-11-08 NOTE — H&P ADULT - NSHPPHYSICALEXAM_GEN_ALL_CORE
Vital Signs Last 24 Hrs  T(C): 36.7 (08 Nov 2023 09:49), Max: 36.8 (07 Nov 2023 20:26)  T(F): 98 (08 Nov 2023 09:49), Max: 98.2 (07 Nov 2023 20:26)  HR: 92 (08 Nov 2023 09:49) (70 - 92)  BP: 140/73 (08 Nov 2023 09:49) (129/71 - 156/87)  BP(mean): --  RR: 18 (08 Nov 2023 09:49) (16 - 18)  SpO2: 98% (08 Nov 2023 09:49) (98% - 100%)    Parameters below as of 08 Nov 2023 09:49  Patient On (Oxygen Delivery Method): room air        CONSTITUTIONAL: well-developed, well-groomed, no apparent distress  EYES: no conjunctival or scleral injection, non-icteric, PERRLA  ENMT: no external nasal lesions, oral mucosa with moist membranes  NECK: trachea midline, no palpable neck mass   RESPIRATORY: breathing comfortably, lungs CTA without wheeze/rales/rhonchi  CARDIOVASCULAR: regular rate and rhythm; +S1S2, no murmurs, rubs, or gallops, no lower extremity edema, 2+ peripheral pulses  GASTROINTESTINAL: soft, nontender, nondistended; +BS throughout, no rebound/guarding  MUSCULOSKELETAL: no joint effusions, normal strength and tone of extremities   NEUROLOGIC: non-focal, sensation intact to light touch in b/l upper and lower extremities   PSYCHIATRIC: AAOx3, appropriate mood and affect  SKIN: no rashes or lesions, warm

## 2023-11-08 NOTE — H&P ADULT - NSHPLABSRESULTS_GEN_ALL_CORE
9.3    6.82  )-----------( 308      ( 07 Nov 2023 23:17 )             30.2     11-07    134<L>  |  101  |  28<H>  ----------------------------<  136<H>  4.6   |  20<L>  |  0.95    Ca    8.9      07 Nov 2023 23:17    TPro  8.7<H>  /  Alb  3.4  /  TBili  0.4  /  DBili  x   /  AST  30  /  ALT  18  /  AlkPhos  108  11-07

## 2023-11-08 NOTE — H&P ADULT - HISTORY OF PRESENT ILLNESS
63F with hx of chronic lymphedema, T2DM, HTN, HLD, recent admission to [facility] (10/31-11/7 for social issues, discharged to [facility]) presents from [facility] because she did not like the facility. Patient says that she is fairly independent, ambulates with a walker. She was discharged to [facility] from [facility], however she did not like the facility because they wanted her to be fully "dependant" and bathe her, which she can do herself. She checked out of the rehab and presented here. She denies fevers, chills, cp, sob, abdominal pain, n/v/d, dysuria, sick contacts, recent travel.

## 2023-11-08 NOTE — PHYSICAL THERAPY INITIAL EVALUATION ADULT - ADDITIONAL COMMENTS
Pt is from rehab where she states that they called 911 on her because she was "too independent". Prior to rehab pt was living in a private house with her daughter and grandson with 3 steps to enter; pt's bedroom is on the first floor. Prior to hospital admission, pt was completely independent and used a rollator with ambulation. Pt denies any recent falls.    Pt left comfortable seated in chair, NAD, all lines intact, all precautions maintained, and RN aware of PT evaluation.

## 2023-11-08 NOTE — H&P ADULT - PROBLEM SELECTOR PLAN 3
BP in 140s range  - hasn't taken medications in some time/unsure what medications she used to take  - monitor BPs, if persistently elevated consider low dose ACEi/ARB iso of T2DM  - monitor vital signs

## 2023-11-08 NOTE — ED ADULT NURSE REASSESSMENT NOTE - NS ED NURSE REASSESS COMMENT FT1
Pt is resting comfortably, offers no complaints. admitted for difficulty walking, pt ambulated to bathroom using rollator. NAD noted. VS as noted. RR even and unlabored. Care ongoing. Safety measures in place.
report received from LOUANN Parham. pt A&04 ambulatory with rollator, respirations even and unlabored, appears in NAD. No complaints of chest pain, headache, nausea, dizziness, vomiting  SOB, fever, chills verbalized. 20GLAC in place.
Received pt at change of shift, PT is resting in stretcher, easily arousable to verbal stimuli, A+Ox4. pt arrives for swelling to bilateral lower extremities. RLE noted to have open wound, pt taking care of dressing. pt awaiting shower and bed placement. will continue to monitor.

## 2023-11-08 NOTE — PHYSICAL THERAPY INITIAL EVALUATION ADULT - PERTINENT HX OF CURRENT PROBLEM, REHAB EVAL
Pt is a 63 year old female with PMHx of Lymphedema and obesity present from rehab for aggressive behavior.

## 2023-11-08 NOTE — H&P ADULT - PROBLEM SELECTOR PLAN 5
FS stable  - not on home medications - reports being on Januvia in the past  - check A1C, FS/SSI qac and hs

## 2023-11-08 NOTE — PHYSICAL THERAPY INITIAL EVALUATION ADULT - PATIENT PROFILE REVIEW, REHAB EVAL
No Formal Activity Order in the Computer; Spoke with LOUANN Gutierrez prior to PT evaluation--> Pt OK for PT consult/OOB activity. Pt deferred vitals/yes

## 2023-11-08 NOTE — H&P ADULT - ASSESSMENT
63F with hx of chronic lymphedema, T2DM, HTN, HLD, recent admission to [facility] (10/31-11/7 for social issues, discharged to [facility]) presents from [facility] because she did not like the facility. Admitted for PT eval/social issues.

## 2023-11-08 NOTE — PATIENT PROFILE ADULT - FALL HARM RISK - HARM RISK INTERVENTIONS

## 2023-11-09 DIAGNOSIS — N39.0 URINARY TRACT INFECTION, SITE NOT SPECIFIED: ICD-10-CM

## 2023-11-09 LAB
GLUCOSE BLDC GLUCOMTR-MCNC: 134 MG/DL — HIGH (ref 70–99)
GLUCOSE BLDC GLUCOMTR-MCNC: 134 MG/DL — HIGH (ref 70–99)
GLUCOSE BLDC GLUCOMTR-MCNC: 139 MG/DL — HIGH (ref 70–99)
GLUCOSE BLDC GLUCOMTR-MCNC: 139 MG/DL — HIGH (ref 70–99)

## 2023-11-09 PROCEDURE — 99232 SBSQ HOSP IP/OBS MODERATE 35: CPT

## 2023-11-09 RX ORDER — NITROFURANTOIN MACROCRYSTAL 50 MG
100 CAPSULE ORAL
Refills: 0 | Status: DISCONTINUED | OUTPATIENT
Start: 2023-11-09 | End: 2023-11-13

## 2023-11-09 RX ADMIN — Medication 100 MILLIGRAM(S): at 17:35

## 2023-11-09 NOTE — CONSULT NOTE ADULT - SUBJECTIVE AND OBJECTIVE BOX
WOUND SURGERY CONSULT NOTE    HPI:  63F with hx of chronic lymphedema, T2DM, HTN, HLD, recent admission to [facility] (10/31-11/7 for social issues, discharged to [facility]) presents from [facility] because she did not like the facility. Patient says that she is fairly independent, ambulates with a walker. She was discharged to [facility] from [facility], however she did not like the facility because they wanted her to be fully "dependant" and bathe her, which she can do herself. She checked out of the rehab and presented here. She denies fevers, chills, cp, sob, abdominal pain, n/v/d, dysuria, sick contacts, recent travel.  (08 Nov 2023 14:42)    Reports that B/L LEs have been edematous for at least one year, and wounds have been present for a couple weeks at least. She says that she had an 'angiogram' and was awaiting a 'venogram' of her LEs at an OSH. She reports that her arterial study was normal. Ambulates for short distances with a walker, does not report claudication. She says she does her own Unna boots at home, replacing them every 2-3 days.     Wound consult requested to assist w/ management of        Current Diet: Diet, DASH/TLC:   Sodium & Cholesterol Restricted  Consistent Carbohydrate No Snacks (CSTCHO) (11-08-23 @ 12:20)      PAST MEDICAL & SURGICAL HISTORY:  Chronic Acquired Lymphedema      Adult-Onset Obesity      Iron Deficiency Anemia          REVIEW OF SYSTEMS:   CONSTITUTIONAL:  No fever. No chills. No dizziness.    HEENT:  No pain, erythema, or discharge. No blurring of vision. No sore throat, URI symptoms. No epistaxis. No tinnitus.    CARDIOVASCULAR:  No chest pain. No palpitations. No lower extremity edema.    RESPIRATORY:  No shortness of breath, cough, pain with respiration, pleuritic chest pain. No hemoptysis. No dyspnea. No paroxysmal nocturnal dyspnea.    GASTROINTESTINAL:  Normal appetite. No nausea, vomiting, diarrhea. No pain. No bloating. No melena.    GENITOURINARY:  No frequency, urgency, nocturia. No hematuria or dysuria.    MUSCULOSKELETAL:  No arthralgias or myalgias. No weakness.     INTEGUMENTARY:  Wounds/lesions as described in HPI    NEUROLOGIC:  No headache. No neck pain. No numbness or tingling of the extremities.    PSYCHIATRIC:  No reported depression or anxiety.     ENDOCRINE:  No history of thyroid, diabetes or adrenal problems.    HEMATOLOGICAL:  No bleeding. No petechiae. No bruising.    MEDICATIONS  (STANDING):  dextrose 50% Injectable 25 Gram(s) IV Push once  furosemide    Tablet 20 milliGRAM(s) Oral daily  heparin   Injectable 5000 Unit(s) SubCutaneous every 8 hours  insulin lispro (ADMELOG) corrective regimen sliding scale   SubCutaneous three times a day before meals  insulin lispro (ADMELOG) corrective regimen sliding scale   SubCutaneous at bedtime  nitrofurantoin monohydrate/macrocrystals (MACROBID) 100 milliGRAM(s) Oral two times a day    MEDICATIONS  (PRN):      Allergies    No Known Allergies    Intolerances        SOCIAL HISTORY:     FAMILY HISTORY:      ---------------------------------------------------------------------------------------------------------------------    PHYSICAL EXAM:  Vital Signs Last 24 Hrs  T(C): 36.7 (08 Nov 2023 19:15), Max: 36.7 (08 Nov 2023 19:15)  T(F): 98.1 (08 Nov 2023 19:15), Max: 98.1 (08 Nov 2023 19:15)  HR: 72 (08 Nov 2023 19:15) (72 - 79)  BP: 132/62 (08 Nov 2023 19:15) (132/62 - 149/77)  BP(mean): --  RR: 17 (08 Nov 2023 19:15) (17 - 18)  SpO2: 99% (08 Nov 2023 19:15) (98% - 99%)    Parameters below as of 08 Nov 2023 19:15  Patient On (Oxygen Delivery Method): room air        GEN: NAD, alert and oriented x 3  HEENT: WNL  CHEST: Symmetrical chest rise, no increased work of breathing, no stridor.  HEART: Regular rate  ABD: Obese. Soft, non-tender, non-distended  EXT: B/L LE warm, good capillary refill. Motor/sensory grossly intact. Pedal pulses non-palpable (pitting edema).    INTEGUMENT/WOUND(S):  - B/L lymphedema, square toes, scattered macerations  - R anterior tibia, total area maceration 10 x 9 cm  - Medial ulcer, 1 x 1.2 x 0.3, anterior ulcer 0.8 x 1 x 0.3 cm  - L posterior isolated ulcer, 3 x 3 x 0.1. Medial small ulcer, 0.5 x 0.5 x 0.2 cm  - No erythema, induration, purulence, crepitus, bullae, malodor or drainage.   - Heavy exudate buildup of RLE wounds  ---------------------------------------------------------------------------------------------------------------------    LABS/ CULTURES/ RADIOLOGY:                        9.3    6.82  )-----------( 308      ( 07 Nov 2023 23:17 )             30.2       134  |  101  |  28  ----------------------------<  136      [11-07-23 @ 23:17]  4.6   |  20  |  0.95        Ca     8.9     [11-07-23 @ 23:17]    TPro  8.7  /  Alb  3.4  /  TBili  0.4  /  DBili  x   /  AST  30  /  ALT  18  /  AlkPhos  108  [11-07-23 @ 23:17]            Creatinine Trend: 0.95<--                  ---------------------------------------------------------------------------------------------------------------------    IMAGING/DIAGNOSTIC STUDIES                  Daily scrub [place] scrub recommended  Aquacel Ag to all macerated/wound areas, cover with ABD pads  Wrap with Kerlix, light compression with ACE wrap.   No overt clinically significant PAD on exam, consider MIKEY/PVRs for objective assessment.       General recommendations for maximizing likelihood of wound healing:  - Maintaining adequate nutrition, especially protein calorie nutrition, should be prioritized in patient's with acute and/or chronic wounds.   - Moisturize intact skin w/ SWEEN cream BID  - Turn and position patient with offloading assistive devices as per protocol  - Continue w/Pericare as per protocol  - Waffle cushion should be used on chair if patient is out of bed to chair.   - Continue w/ low air loss fluidized bed surface       Remainder of care as per primary team.     Upon discharge f/u as outpatient at [facility] [address] [place], [place] [phone]  Seen w/wound care team and discussed with attending    Thank you for this consult      Breezy Murphy MD  Wound Care Surgery    If after 4PM or before 7:30AM on Mon-Friday or weekend/holiday please contact general surgery for urgent matters.   Team A- [id number]   Team B- [id number]

## 2023-11-10 ENCOUNTER — TRANSCRIPTION ENCOUNTER (OUTPATIENT)
Age: 63
End: 2023-11-10

## 2023-11-10 DIAGNOSIS — U07.1 COVID-19: ICD-10-CM

## 2023-11-10 LAB
GLUCOSE BLDC GLUCOMTR-MCNC: 109 MG/DL — HIGH (ref 70–99)
GLUCOSE BLDC GLUCOMTR-MCNC: 109 MG/DL — HIGH (ref 70–99)
GLUCOSE BLDC GLUCOMTR-MCNC: 112 MG/DL — HIGH (ref 70–99)
GLUCOSE BLDC GLUCOMTR-MCNC: 112 MG/DL — HIGH (ref 70–99)
GLUCOSE BLDC GLUCOMTR-MCNC: 132 MG/DL — HIGH (ref 70–99)
GLUCOSE BLDC GLUCOMTR-MCNC: 132 MG/DL — HIGH (ref 70–99)
GLUCOSE BLDC GLUCOMTR-MCNC: 158 MG/DL — HIGH (ref 70–99)
GLUCOSE BLDC GLUCOMTR-MCNC: 158 MG/DL — HIGH (ref 70–99)
SARS-COV-2 RNA SPEC QL NAA+PROBE: DETECTED
SARS-COV-2 RNA SPEC QL NAA+PROBE: DETECTED

## 2023-11-10 PROCEDURE — 90792 PSYCH DIAG EVAL W/MED SRVCS: CPT

## 2023-11-10 PROCEDURE — 99232 SBSQ HOSP IP/OBS MODERATE 35: CPT

## 2023-11-10 RX ORDER — FUROSEMIDE 40 MG
1 TABLET ORAL
Qty: 0 | Refills: 0 | DISCHARGE
Start: 2023-11-10

## 2023-11-10 RX ORDER — NITROFURANTOIN MACROCRYSTAL 50 MG
1 CAPSULE ORAL
Qty: 8 | Refills: 0
Start: 2023-11-10 | End: 2023-11-13

## 2023-11-10 RX ORDER — FUROSEMIDE 40 MG
1 TABLET ORAL
Refills: 0 | DISCHARGE

## 2023-11-10 RX ORDER — OLANZAPINE 15 MG/1
2.5 TABLET, FILM COATED ORAL EVERY 6 HOURS
Refills: 0 | Status: DISCONTINUED | OUTPATIENT
Start: 2023-11-10 | End: 2023-11-13

## 2023-11-10 RX ORDER — GABAPENTIN 400 MG/1
1 CAPSULE ORAL
Refills: 0 | DISCHARGE

## 2023-11-10 RX ORDER — QUETIAPINE FUMARATE 200 MG/1
25 TABLET, FILM COATED ORAL EVERY 6 HOURS
Refills: 0 | Status: DISCONTINUED | OUTPATIENT
Start: 2023-11-10 | End: 2023-11-13

## 2023-11-10 RX ADMIN — Medication 100 MILLIGRAM(S): at 09:03

## 2023-11-10 RX ADMIN — Medication 100 MILLIGRAM(S): at 17:28

## 2023-11-10 NOTE — DISCHARGE NOTE PROVIDER - NSDCMRMEDTOKEN_GEN_ALL_CORE_FT
atorvastatin 40 mg oral tablet: 1 tab(s) orally once a day (per pharmacy, last dispensed 7/1/23)  furosemide 20 mg oral tablet: 1 tab(s) orally once a day  Januvia 50 mg oral tablet: 1 tab(s) orally once a day (per pharmacy, last dispensed 7/1/23)  nitrofurantoin macrocrystals-monohydrate 100 mg oral capsule: 1 cap(s) orally 2 times a day   atorvastatin 40 mg oral tablet: 1 tab(s) orally once a day (per pharmacy, last dispensed 7/1/23)  furosemide 20 mg oral tablet: 1 tab(s) orally once a day  Januvia 50 mg oral tablet: 1 tab(s) orally once a day (per pharmacy, last dispensed 7/1/23)  Multiple Vitamins oral tablet: 1 tab(s) orally once a day  nitrofurantoin macrocrystals-monohydrate 100 mg oral capsule: 1 cap(s) orally 2 times a day  Vitamin D3 50 mcg (2000 intl units) oral tablet: 1 tab(s) orally once a day   atorvastatin 40 mg oral tablet: 1 tab(s) orally once a day (per pharmacy, last dispensed 7/1/23)  furosemide 20 mg oral tablet: 1 tab(s) orally once a day  furosemide 20 mg oral tablet: 1 tab(s) orally once a day  Januvia 50 mg oral tablet: 1 tab(s) orally once a day (per pharmacy, last dispensed 7/1/23)  Multiple Vitamins oral tablet: 1 tab(s) orally once a day  nitrofurantoin macrocrystals-monohydrate 100 mg oral capsule: 1 cap(s) orally 2 times a day  Vitamin D3 50 mcg (2000 intl units) oral tablet: 1 tab(s) orally once a day   atorvastatin 40 mg oral tablet: 1 tab(s) orally once a day (per pharmacy, last dispensed 7/1/23)  calamine topical lotion: 1 Apply topically to affected area once a day  furosemide 20 mg oral tablet: 1 tab(s) orally once a day  Januvia 50 mg oral tablet: 1 tab(s) orally once a day (per pharmacy, last dispensed 7/1/23)  Multiple Vitamins oral tablet: 1 tab(s) orally once a day  Vitamin D3 50 mcg (2000 intl units) oral tablet: 1 tab(s) orally once a day

## 2023-11-10 NOTE — BH CONSULTATION LIAISON ASSESSMENT NOTE - NSBHCHARTREVIEWVS_PSY_A_CORE FT
Vital Signs Last 24 Hrs  T(C): 36.9 (10 Nov 2023 12:22), Max: 37.1 (09 Nov 2023 20:47)  T(F): 98.4 (10 Nov 2023 12:22), Max: 98.8 (09 Nov 2023 20:47)  HR: 99 (10 Nov 2023 12:22) (99 - 100)  BP: 147/67 (10 Nov 2023 12:22) (146/62 - 147/67)  BP(mean): --  RR: 17 (10 Nov 2023 12:22) (17 - 17)  SpO2: 100% (10 Nov 2023 12:22) (100% - 100%)    Parameters below as of 10 Nov 2023 12:22  Patient On (Oxygen Delivery Method): room air

## 2023-11-10 NOTE — DISCHARGE NOTE PROVIDER - NSDCCPCAREPLAN_GEN_ALL_CORE_FT
PRINCIPAL DISCHARGE DIAGNOSIS  Diagnosis: Difficulty walking  Assessment and Plan of Treatment: You were admitted for rehab placement. Your lymphedema of your legs was addressed by wound care.      SECONDARY DISCHARGE DIAGNOSES  Diagnosis: E. coli UTI  Assessment and Plan of Treatment: You were diagnosed as an outpatient with a UTI and you were meant to start nitrofurantoin. We started it while you were admitted for a total of 5 days/     PRINCIPAL DISCHARGE DIAGNOSIS  Diagnosis: Difficulty walking  Assessment and Plan of Treatment: You were admitted for rehab placement. Your lymphedema of your legs was addressed by wound care. Please have repeat blood work performed in 1-2 weeks to check for resolution of your solution      SECONDARY DISCHARGE DIAGNOSES  Diagnosis: E. coli UTI  Assessment and Plan of Treatment: You were diagnosed as an outpatient with a UTI and you were meant to start nitrofurantoin. We started it while you were admitted for a total of 5 days/     PRINCIPAL DISCHARGE DIAGNOSIS  Diagnosis: Encounter for rehabilitation  Assessment and Plan of Treatment: You were admitted for rehab placement. SW assisted and you will leave for rehab today.      SECONDARY DISCHARGE DIAGNOSES  Diagnosis: E. coli UTI  Assessment and Plan of Treatment: You were diagnosed as an outpatient with a UTI and you were meant to start nitrofurantoin. We started it while you were admitted for a total of 5 days.    Diagnosis: Itching  Assessment and Plan of Treatment: Likely 2/2 bed bugs  Continue calamine lotion PRN for itchiness     PRINCIPAL DISCHARGE DIAGNOSIS  Diagnosis: Encounter for rehabilitation  Assessment and Plan of Treatment: You were admitted for rehab placement. SW assisted and you will leave for rehab today.      SECONDARY DISCHARGE DIAGNOSES  Diagnosis: E. coli UTI  Assessment and Plan of Treatment: You were diagnosed as an outpatient with a UTI and you were meant to start nitrofurantoin. We started it while you were admitted for a total of 5 days. Last dose 11/14 AM.    Diagnosis: Itching  Assessment and Plan of Treatment: Likely 2/2 bed bugs  Continue calamine lotion PRN for itchiness     PRINCIPAL DISCHARGE DIAGNOSIS  Diagnosis: Encounter for rehabilitation  Assessment and Plan of Treatment: You were admitted for rehab placement. SW assisted and you will leave for rehab today.      SECONDARY DISCHARGE DIAGNOSES  Diagnosis: E. coli UTI  Assessment and Plan of Treatment: You were diagnosed as an outpatient with a UTI and you were meant to start nitrofurantoin. We started it while you were admitted for a total of 5 days. Last dose 11/14 AM.    Diagnosis: Itching  Assessment and Plan of Treatment: Likely 2/2 bed bugs  Continue calamine lotion PRN for itchiness    Diagnosis: 2019 novel coronavirus disease (COVID-19)  Assessment and Plan of Treatment: You were found to have COVID infection as inpatient  You were asymptomatic and saturating well on room air  Please follow up with your PCP 1-2 weeks  Please maintain airborne isolation x 5 days from positive test

## 2023-11-10 NOTE — BH CONSULTATION LIAISON ASSESSMENT NOTE - CURRENT MEDICATION
MEDICATIONS  (STANDING):  dextrose 50% Injectable 25 Gram(s) IV Push once  furosemide    Tablet 20 milliGRAM(s) Oral daily  heparin   Injectable 5000 Unit(s) SubCutaneous every 8 hours  insulin lispro (ADMELOG) corrective regimen sliding scale   SubCutaneous three times a day before meals  insulin lispro (ADMELOG) corrective regimen sliding scale   SubCutaneous at bedtime  nitrofurantoin monohydrate/macrocrystals (MACROBID) 100 milliGRAM(s) Oral two times a day    MEDICATIONS  (PRN):

## 2023-11-10 NOTE — BH CONSULTATION LIAISON ASSESSMENT NOTE - RISK ASSESSMENT
risk: irritable  protective: no SI or HI, no known psych hx or documentation, states lives with daughter, wants to live for her grandson

## 2023-11-10 NOTE — BH CONSULTATION LIAISON ASSESSMENT NOTE - SUMMARY
Patient is a 63F with hx of chronic lymphedema, T2DM, HTN, HLD, recent admission to [facility] (10/31-11/7 for social issues, discharged to [facility]) presents from [facility] because she did not like the facility. Admitted for PT eval/social issues. patient reports being a  - served in the [employer] in 1980s? currently not working, getting social security, denies being on disability. Has an adult daughter whom she has a mother daughter home with, as well as patient grandson who is 6. Patient denies any past psychiatric hx. Did report seeing a therapist when her parents  when she was a child. no hx of SA. No substance abuse reported.    PLAN  - observation deferred to primary team  - patient offered medication - declined - no Si or Hi, allowing for care  - EKG  - antipsychotics can only be given if qtc < 500   - PRN for mild agitation: Seroquel 25mg q6hrs. PRN for severe agitation Zyprexa 2.5mg q6hrs   - ongoing attempts at collateral    Patient is a 63F with hx of chronic lymphedema, T2DM, HTN, HLD, recent admission to [facility] (10/31-11/7 for social issues, discharged to [facility]) presents from [facility] because she did not like the facility. Admitted for PT eval/social issues. patient reports being a  - served in the [employer] in 1980s? currently not working, getting social security, denies being on disability. Has an adult daughter whom she has a mother daughter home with, as well as patient grandson who is 6. Patient denies any past psychiatric hx. Did report seeing a therapist when her parents  when she was a child. no hx of SA. No substance abuse reported.    PLAN  - observation deferred to primary team  - patient offered psychiatric medication - declined - no Si or Hi, allowing for care  -- does express interest in anger management which can be f/u as outpatient   - EKG  - antipsychotics can only be given if qtc < 500   - PRN for mild agitation: Seroquel 25mg q6hrs. PRN for severe agitation Zyprexa 2.5mg q6hrs

## 2023-11-10 NOTE — BH CONSULTATION LIAISON ASSESSMENT NOTE - NSBHATTESTCOMMENTATTENDFT_PSY_A_CORE
met with the patient via tele-platform along with acp on 11/10/2023. Impression and plan discussed and agreed upon.

## 2023-11-10 NOTE — BH CONSULTATION LIAISON ASSESSMENT NOTE - HPI (INCLUDE ILLNESS QUALITY, SEVERITY, DURATION, TIMING, CONTEXT, MODIFYING FACTORS, ASSOCIATED SIGNS AND SYMPTOMS)
Patient is a 63F with hx of chronic lymphedema, T2DM, HTN, HLD, recent admission to [facility] (10/31-11/7 for social issues, discharged to [facility] presents from [facility] because she did not like the facility. Admitted for PT eval/social issues. patient reports being a  - served in the [employer] in 1980s? currently not working, getting social security, denies being on disability. Has an adult daughter whom she has a mother daughter home with, as well as patient grandson who is 6. Patient denies any past psychiatric hx. Did report seeing a therapist when her parents  when she was a child. no hx of SA. No substance abuse reported.     patient was seen and assessed at bedside. Patient with numerous belongings throughout room, playing loud music, loud voice, eccentric. Patient is alert, oriented, currently calm and cooperates with interview. recently, sanju AWAD was called as patient was agitated after finding out her discharge was cancelled. As per patient, she has no symptoms of Covid and believes it is a false positive. Wants to be re-tested. Patient feels the hospital is inaccurate, does not blame a specific person. Often finds topics of conversation where the hospital is incorrect or not satisfying her needs. Patient talks about being "surrounded by 8 security officers" earlier today which was not necessary. Feels it was done as she is a "black female". Patient often discusses being a "black female" and people making judgements based on her race. She states people assume she does drugs or treat her differently based on her skin color.  Patient does cooperate and tell provider she has no SI or HI. no thoughts of harming others. no AH or VH. Does report poor coping skills of dealing with anger - which makes her feel "bad inside" - refuses medication for this but will accept referrals for therapy. Declines answering if family has any mental health dx or daughters contact.     attempted to call patient listed contact - ramiro- number not accepting calls at this time  Patient is a 63F with hx of chronic lymphedema, T2DM, HTN, HLD, recent admission to [facility] (10/31-11/7 for social issues, discharged to [facility]) presents from [facility] because she did not like the facility. Admitted for PT eval/social issues. patient reports being a  - served in the [employer] in 1980s? currently not working, getting social security, denies being on disability. Has an adult daughter whom she has a mother daughter home with, as well as patient grandson who is 6. Patient denies any past psychiatric hx. Did report seeing a therapist when her parents  when she was a child. no hx of SA. No substance abuse reported.     patient was seen and assessed at bedside. Patient with numerous belongings throughout room, playing loud music, loud voice, eccentric. Patient is alert, oriented, currently calm and cooperates with interview. recently, code DELMI was called as patient was agitated after finding out her discharge was cancelled. As per patient, she has no symptoms of Covid and believes it is a false positive. Wants to be re-tested. Patient feels the hospital is inaccurate, does not blame a specific person. Often finds topics of conversation where the hospital is incorrect or not satisfying her needs. Patient talks about being "surrounded by 8 security officers" earlier today which was not necessary. Feels it was done as she is a "black female". Patient often discusses being a "black female" and people making judgements based on her race. She states people assume she does drugs or treat her differently based on her skin color.  Patient does cooperate and tell provider she has no SI or HI. no thoughts of harming others. no AH or VH. Does report poor coping skills of dealing with anger - which makes her feel "bad inside" - refuses medication for this but will accept referrals for therapy. Declines answering if family has any mental health dx or daughters contact.     Spoke to patient friend who is listed as emergency contact Debbie - as per Debbie she is not aware of any mental health hx. patient has also not mentioned any SI or HI recently. Has no acute safety concerns.

## 2023-11-10 NOTE — DISCHARGE NOTE PROVIDER - NSFOLLOWUPCLINICS_GEN_ALL_ED_FT
[facility] General Internal Medicine  General Internal Medicine  [address] [place] [address]  Phone: [phone]  Fax:

## 2023-11-10 NOTE — BH CONSULTATION LIAISON ASSESSMENT NOTE - NSICDXPASTMEDICALHX_GEN_ALL_CORE_FT
PAST MEDICAL HISTORY:  Adult-Onset Obesity     Chronic Acquired Lymphedema     DM (diabetes mellitus)     HTN (hypertension)     Iron Deficiency Anemia

## 2023-11-10 NOTE — DISCHARGE NOTE NURSING/CASE MANAGEMENT/SOCIAL WORK - PATIENT PORTAL LINK FT
You can access the FollowMyHealth Patient Portal offered by [facility] by registering at the following website: [email or web address]. By joining [facility]’s FollowMyHealth portal, you will also be able to view your health information using other applications (apps) compatible with our system.

## 2023-11-10 NOTE — BH CONSULTATION LIAISON ASSESSMENT NOTE - NSBHATTESTAPPAMEND_PSY_A_CORE
I have personally seen and examined this patient. I fully participated in the care of this patient. I have made amendments to the documentation where appropriate and otherwise agree with the history, physical exam, and plan as documented by the ADELSO

## 2023-11-10 NOTE — CHART NOTE - NSCHARTNOTEFT_GEN_A_CORE
Jarret DELMI was called when pt left her room and tried to confront staff at the nurses station. Pt was extremely irate and agitated that her discharge was canceled due to her testing positive for COVID-19. Pt stated irrational and bizzarre statements claiming now she can't take care of her baby due to her testing positive for COVID and demanded to be swabbed again to test negative. Pt was redirected to her room and is planned to be moved to another unit for isolation precautions. Pt was made one to one to ensure pt safety and psych was consulted for diagnosis of any underlying psychiatric disorders as pt does not have past psych history. Attending made aware and will continue to monitor pt.    Rodrigo Burdick ACP

## 2023-11-10 NOTE — DISCHARGE NOTE PROVIDER - HOSPITAL COURSE
Patient is a 62yo F with chronic b/l LE lymphedema, T2DM, HTN, HLD, recently admitted to [facility] and discharged to [facility] who presented from the [facility] after leaving due to not liking the facility. The patient was admitted to medicine for placement to rehab. While admitted, she was seen by wound care.    She was recently diagnosed with a UTI outpatient, with UCx demonstrating Ecoli sensitive to nitrofurantoin. She had been meant to start taking nitrofurantoin but not been able to. She was started on nitrofurantoin for a 5 day course.     She is hemodynamically stable and medically ready for discharge to rehab.    62 yo F PMH chronic b/l LE lymphedema, T2DM, HTN, HLD, recently admitted to [facility] and discharged to [facility] who presented from the [facility] after leaving due to not liking the facility. Patient was admitted to medicine for placement to rehab. While admitted, she was seen by wound care for RLE wound. Patient is planned to follow-up with [facility] wound care for anterior tibia maceration, posterior RLE ulcer, medial RLE ulcer. Patient was recently diagnosed with UTI as outpatient UCx demonstrating Ecoli sensitive to nitrofurantoin. Patient was prescribed nitrofurantoin but never took it. While inpatient, patient was started on 5 day course of nitrofurantoin. Hospital course c/b CODE DELMI for agitation and psych evaluated however patient declined medication - psych recommended PRN seroquel and zyprexa. Patient also c/o itching on chest and back which patient attributes to bed bug bites for which patient was treated with calamine lotion. Patient will be discharged to rehab today.   64 yo F PMH chronic b/l LE lymphedema, T2DM, HTN, HLD, recently admitted to [facility] and discharged to [facility] who presented from the [facility] after leaving due to not liking the facility. Patient was admitted to medicine for placement to rehab. While admitted, she was seen by wound care for RLE wound. Patient is planned to follow-up with [facility] wound care for anterior tibia maceration, posterior RLE ulcer, medial RLE ulcer. Patient was recently diagnosed with UTI as outpatient UCx demonstrating Ecoli sensitive to nitrofurantoin. Patient was prescribed nitrofurantoin but never took it. While inpatient, patient was started on 5 day course of nitrofurantoin. Hospital course c/b CODE DELMI for agitation and psych evaluated however patient declined medication - psych recommended PRN seroquel and zyprexa. Patient also c/o itching on chest and back which patient attributes to bed bug bites for which patient was treated with calamine lotion. Patient also found to have asymptomatic COVID infection. Patient will be discharged to rehab today.

## 2023-11-10 NOTE — BH CONSULTATION LIAISON ASSESSMENT NOTE - OTHER
coming from rehab -did not like rehab placement  SSI loud perseverates on her race  suspicious of others

## 2023-11-10 NOTE — DISCHARGE NOTE PROVIDER - ATTENDING DISCHARGE PHYSICAL EXAMINATION:
CONSTITUTIONAL: NAD, well-groomed  EYES: PERRLA; conjunctiva and sclera clear  ENMT: Moist oral mucosa, no pharyngeal injection or exudates; normal dentition  NECK: Supple, no palpable masses; no thyromegaly  RESPIRATORY: Normal respiratory effort; lungs are clear to auscultation bilaterally  CARDIOVASCULAR: Regular rate and rhythm, normal S1 and S2, no murmur/rub/gallop; Peripheral pulses are 2+ bilaterally; b/l LE chronic venous insufficiency with ulceration and wounds  ABDOMEN: Nontender to palpation, normoactive bowel sounds, no rebound/guarding; No hepatosplenomegaly  MUSCULOSKELETAL:   no clubbing or cyanosis of digits; no joint swelling or tenderness to palpation  PSYCH: A+O to person, place, and time; affect appropriate  NEUROLOGY: CN 2-12 are intact and symmetric; no gross sensory deficits   SKIN: No rashes; no palpable lesions CONSTITUTIONAL: NAD, well-groomed  EYES: PERRLA; conjunctiva and sclera clear  ENMT: Moist oral mucosa, no pharyngeal injection or exudates; normal dentition  NECK: Supple, no palpable masses; no thyromegaly  RESPIRATORY: Normal respiratory effort; lungs are clear to auscultation bilaterally  CARDIOVASCULAR: Regular rate and rhythm, normal S1 and S2, no murmur/rub/gallop; Peripheral pulses are 2+ bilaterally; b/l LE chronic venous insufficiency with ulceration and wounds  ABDOMEN: Nontender to palpation, normoactive bowel sounds, no rebound/guarding; No hepatosplenomegaly  MUSCULOSKELETAL:   no clubbing or cyanosis of digits; no joint swelling or tenderness to palpation  PSYCH: A+O to person, place, and time; affect appropriate  NEUROLOGY: CN 2-12 are intact and symmetric; no gross sensory deficits   SKIN: RLE ulcers wrapped in clean gauze

## 2023-11-11 DIAGNOSIS — I10 ESSENTIAL (PRIMARY) HYPERTENSION: ICD-10-CM

## 2023-11-11 DIAGNOSIS — M79.89 OTHER SPECIFIED SOFT TISSUE DISORDERS: ICD-10-CM

## 2023-11-11 DIAGNOSIS — D50.9 IRON DEFICIENCY ANEMIA, UNSPECIFIED: ICD-10-CM

## 2023-11-11 DIAGNOSIS — R26.9 UNSPECIFIED ABNORMALITIES OF GAIT AND MOBILITY: ICD-10-CM

## 2023-11-11 DIAGNOSIS — I89.0 LYMPHEDEMA, NOT ELSEWHERE CLASSIFIED: ICD-10-CM

## 2023-11-11 DIAGNOSIS — E11.9 TYPE 2 DIABETES MELLITUS WITHOUT COMPLICATIONS: ICD-10-CM

## 2023-11-11 DIAGNOSIS — Z79.84 LONG TERM (CURRENT) USE OF ORAL HYPOGLYCEMIC DRUGS: ICD-10-CM

## 2023-11-11 LAB
B PERT DNA SPEC QL NAA+PROBE: SIGNIFICANT CHANGE UP
B PERT DNA SPEC QL NAA+PROBE: SIGNIFICANT CHANGE UP
B PERT+PARAPERT DNA PNL SPEC NAA+PROBE: SIGNIFICANT CHANGE UP
B PERT+PARAPERT DNA PNL SPEC NAA+PROBE: SIGNIFICANT CHANGE UP
BORDETELLA PARAPERTUSSIS (RAPRVP): SIGNIFICANT CHANGE UP
BORDETELLA PARAPERTUSSIS (RAPRVP): SIGNIFICANT CHANGE UP
C PNEUM DNA SPEC QL NAA+PROBE: SIGNIFICANT CHANGE UP
C PNEUM DNA SPEC QL NAA+PROBE: SIGNIFICANT CHANGE UP
FLUAV SUBTYP SPEC NAA+PROBE: SIGNIFICANT CHANGE UP
FLUAV SUBTYP SPEC NAA+PROBE: SIGNIFICANT CHANGE UP
FLUBV RNA SPEC QL NAA+PROBE: SIGNIFICANT CHANGE UP
FLUBV RNA SPEC QL NAA+PROBE: SIGNIFICANT CHANGE UP
GLUCOSE BLDC GLUCOMTR-MCNC: 123 MG/DL — HIGH (ref 70–99)
GLUCOSE BLDC GLUCOMTR-MCNC: 123 MG/DL — HIGH (ref 70–99)
GLUCOSE BLDC GLUCOMTR-MCNC: 140 MG/DL — HIGH (ref 70–99)
GLUCOSE BLDC GLUCOMTR-MCNC: 140 MG/DL — HIGH (ref 70–99)
GLUCOSE BLDC GLUCOMTR-MCNC: 141 MG/DL — HIGH (ref 70–99)
GLUCOSE BLDC GLUCOMTR-MCNC: 141 MG/DL — HIGH (ref 70–99)
GLUCOSE BLDC GLUCOMTR-MCNC: 156 MG/DL — HIGH (ref 70–99)
GLUCOSE BLDC GLUCOMTR-MCNC: 156 MG/DL — HIGH (ref 70–99)
HADV DNA SPEC QL NAA+PROBE: SIGNIFICANT CHANGE UP
HADV DNA SPEC QL NAA+PROBE: SIGNIFICANT CHANGE UP
HCOV 229E RNA SPEC QL NAA+PROBE: SIGNIFICANT CHANGE UP
HCOV 229E RNA SPEC QL NAA+PROBE: SIGNIFICANT CHANGE UP
HCOV HKU1 RNA SPEC QL NAA+PROBE: SIGNIFICANT CHANGE UP
HCOV HKU1 RNA SPEC QL NAA+PROBE: SIGNIFICANT CHANGE UP
HCOV NL63 RNA SPEC QL NAA+PROBE: SIGNIFICANT CHANGE UP
HCOV NL63 RNA SPEC QL NAA+PROBE: SIGNIFICANT CHANGE UP
HCOV OC43 RNA SPEC QL NAA+PROBE: SIGNIFICANT CHANGE UP
HCOV OC43 RNA SPEC QL NAA+PROBE: SIGNIFICANT CHANGE UP
HMPV RNA SPEC QL NAA+PROBE: SIGNIFICANT CHANGE UP
HMPV RNA SPEC QL NAA+PROBE: SIGNIFICANT CHANGE UP
HPIV1 RNA SPEC QL NAA+PROBE: SIGNIFICANT CHANGE UP
HPIV1 RNA SPEC QL NAA+PROBE: SIGNIFICANT CHANGE UP
HPIV2 RNA SPEC QL NAA+PROBE: SIGNIFICANT CHANGE UP
HPIV2 RNA SPEC QL NAA+PROBE: SIGNIFICANT CHANGE UP
HPIV3 RNA SPEC QL NAA+PROBE: SIGNIFICANT CHANGE UP
HPIV3 RNA SPEC QL NAA+PROBE: SIGNIFICANT CHANGE UP
HPIV4 RNA SPEC QL NAA+PROBE: SIGNIFICANT CHANGE UP
HPIV4 RNA SPEC QL NAA+PROBE: SIGNIFICANT CHANGE UP
M PNEUMO DNA SPEC QL NAA+PROBE: SIGNIFICANT CHANGE UP
M PNEUMO DNA SPEC QL NAA+PROBE: SIGNIFICANT CHANGE UP
RAPID RVP RESULT: DETECTED
RAPID RVP RESULT: DETECTED
RSV RNA SPEC QL NAA+PROBE: SIGNIFICANT CHANGE UP
RSV RNA SPEC QL NAA+PROBE: SIGNIFICANT CHANGE UP
RV+EV RNA SPEC QL NAA+PROBE: SIGNIFICANT CHANGE UP
RV+EV RNA SPEC QL NAA+PROBE: SIGNIFICANT CHANGE UP
SARS-COV-2 RNA SPEC QL NAA+PROBE: DETECTED
SARS-COV-2 RNA SPEC QL NAA+PROBE: DETECTED

## 2023-11-11 PROCEDURE — 99232 SBSQ HOSP IP/OBS MODERATE 35: CPT

## 2023-11-11 RX ADMIN — Medication 100 MILLIGRAM(S): at 18:13

## 2023-11-11 RX ADMIN — Medication 100 MILLIGRAM(S): at 05:35

## 2023-11-11 NOTE — PROGRESS NOTE ADULT - TIME BILLING
Time-based billing (NON-critical care).     35 minutes spent on total encounter; more than 50% of the visit was spent counseling and / or coordinating care by the attending physician.  The necessity of the time spent during the encounter on this date of service was due to:     review of laboratory data, radiology results, consultants' recommendations, documentation in [place], discussion with patient/ACP and interdisciplinary staff (such as , social workers, etc). Interventions were performed as documented above.
Time-based billing (NON-critical care).     35 minutes spent on total encounter; more than 50% of the visit was spent counseling and / or coordinating care by the attending physician.  The necessity of the time spent during the encounter on this date of service was due to:     review of laboratory data, radiology results, consultants' recommendations, documentation in [place], discussion with patient/ACP and interdisciplinary staff (such as , social workers, etc). Interventions were performed as documented above.
Time-based billing (NON-critical care).     35 minutes spent on total encounter; more than 50% of the visit was spent counseling and / or coordinating care by the attending physician.  The necessity of the time spent during the encounter on this date of service was due to:     review of laboratory data, radiology results, consultants' recommendations, documentation in [place], discussion with patient/ACP and interdisciplinary staff (such as , social workers, etc). Interventions were performed as documented above.

## 2023-11-12 LAB
GLUCOSE BLDC GLUCOMTR-MCNC: 120 MG/DL — HIGH (ref 70–99)
GLUCOSE BLDC GLUCOMTR-MCNC: 120 MG/DL — HIGH (ref 70–99)
GLUCOSE BLDC GLUCOMTR-MCNC: 129 MG/DL — HIGH (ref 70–99)
GLUCOSE BLDC GLUCOMTR-MCNC: 129 MG/DL — HIGH (ref 70–99)
GLUCOSE BLDC GLUCOMTR-MCNC: 136 MG/DL — HIGH (ref 70–99)
GLUCOSE BLDC GLUCOMTR-MCNC: 136 MG/DL — HIGH (ref 70–99)
GLUCOSE BLDC GLUCOMTR-MCNC: 138 MG/DL — HIGH (ref 70–99)
GLUCOSE BLDC GLUCOMTR-MCNC: 138 MG/DL — HIGH (ref 70–99)

## 2023-11-12 PROCEDURE — 99232 SBSQ HOSP IP/OBS MODERATE 35: CPT

## 2023-11-12 RX ORDER — CALAMINE AND ZINC OXIDE AND PHENOL 160; 10 MG/ML; MG/ML
1 LOTION TOPICAL DAILY
Refills: 0 | Status: DISCONTINUED | OUTPATIENT
Start: 2023-11-12 | End: 2023-11-13

## 2023-11-12 RX ADMIN — Medication 20 MILLIGRAM(S): at 05:57

## 2023-11-12 RX ADMIN — Medication 100 MILLIGRAM(S): at 19:49

## 2023-11-12 RX ADMIN — Medication 100 MILLIGRAM(S): at 05:57

## 2023-11-12 NOTE — PROGRESS NOTE ADULT - PROBLEM SELECTOR PLAN 5
not on home medications  outpt PMD f/u
not on home medications  outpt PMD f/u
not on home medications  - check lipid profile
not on home medications  - check lipid profile

## 2023-11-12 NOTE — PROGRESS NOTE ADULT - PROBLEM SELECTOR PLAN 2
Hb 9.3  - microcytic, check iron studies/ferritin  - trend CBC

## 2023-11-12 NOTE — PROGRESS NOTE ADULT - PROBLEM SELECTOR PLAN 3
- outpatient UA positive, UCx with >100K CFUs Ecoli - sensitive to nitrofurantoin  - was prescribed nitrofurantoin as outpatient but did not start taking it  - c/w nitrofurantoin for 5d course of 100mg po BID
- outpatient UA positive, UCx with >100K CFUs Ecoli - sensitive to nitrofurantoin  - was prescribed nitrofurantoin as outpatient but did not start taking it  - start nitrofurantoin for 5d course of 100mg po BID
- outpatient UA positive, UCx with >100K CFUs Ecoli - sensitive to nitrofurantoin  - was prescribed nitrofurantoin as outpatient but did not start taking it  - c/w nitrofurantoin for 5d course of 100mg po BID
- outpatient UA positive, UCx with >100K CFUs Ecoli - sensitive to nitrofurantoin  - was prescribed nitrofurantoin as outpatient but did not start taking it  - c/w nitrofurantoin for 5d course of 100mg po BID

## 2023-11-12 NOTE — PROGRESS NOTE ADULT - PROBLEM SELECTOR PLAN 1
chronic bilateral LE swelling  - c/w lasix 20mg daily   - wound care evaluation appreciated
chronic bilateral LE swelling  - c/w lasix 20mg daily   - wound care evaluation appreciated  - PT eval: no skilled PT needs
chronic bilateral LE swelling  - c/w lasix 20mg daily   - wound care evaluation appreciated  - PT eval: no skilled PT needs
chronic bilateral LE swelling  - c/w lasix 20mg daily   - wound care evaluation appreciated

## 2023-11-12 NOTE — PROVIDER CONTACT NOTE (OTHER) - ASSESSMENT
Pt refuses to answer orientation questions, seems paranoid
patient stated "She wants to be left alone"
pt is aggressive, argumentative and not very cooperative
Pt was discharged from [facility] to [facility], upon arrival at Rehab, pt refused to shower and got aggressive and was send to ED. Met with pt, she is alert, oriented, Pt said she wants to go to a Rehab for her wound care. Discussed with Providers, pending eval.
pt reports having cloudy urine, urine however hasn't been seen by any member of medical team

## 2023-11-12 NOTE — PROGRESS NOTE ADULT - ASSESSMENT
63F with hx of chronic lymphedema, T2DM, HTN, HLD, recent admission to [facility] (10/31-11/7 for social issues, discharged to [facility]) presents from [facility] because she did not like the facility. Admitted for PT eval/social issues. 
63F with hx of chronic lymphedema, T2DM, HTN, HLD, recent admission to [facility] (10/31-11/7 for social issues, discharged to [facility]) presents from [facility] because she did not like the facility. Admitted for PT eval/social issues. 
63F with hx of chronic lymphedema, T2DM, HTN, HLD, recent admission to [facility] (10/31-11/7 for social issues, discharged to [facility]) presents from [facility] because she did not like the facility. Admitted for PT eval/social issues. 
63F with hx of chronic lymphedema, T2DM, HTN, HLD, recent admission to [facility] (10/31-11/7 for social issues, discharged to [facility]) presents from [facility] because she did not like the facility. Admitted for PT eval/social issues.

## 2023-11-12 NOTE — PROVIDER CONTACT NOTE (OTHER) - SITUATION
Patient refusing vital signs, medications, and care.
Patient refusing vital signs and blood sugar checks
pt reports she was recently diagnosed with a UTI and was being treated with ABT. pt is requesting a urine test so she can continue to be treated for a UTI.
Pt refuses assessment, refuses wound care. Pt has foul smelling dressing to b/l LE, refuses assessment and wound care; when education provided, pt becomes verbally abusive ([facility] staff)
pt refused her morning labs, refused her morning vital signs, refused her morning medication and unable to do her physical assessment. Insists on doing her own wound care.

## 2023-11-12 NOTE — PROVIDER CONTACT NOTE (OTHER) - ACTION/TREATMENT ORDERED:
Provider aware patient refusing vital signs, medications, and care. Patient educated on risks of refusal.
awaiting orders
No new order given. Care plan ongoing.
Provider aware and acknowledged

## 2023-11-12 NOTE — PROGRESS NOTE ADULT - PROBLEM SELECTOR PLAN 8
DVT ppx: lovenox  DIET: DASH + CC  DISPO: dc to rehab when facility accepts her - had COVID+ status    Patient asking for outpatient mammogram and counseling - I urged her to find an outpatient PMD who could help arrange and follow up results of tests and referrals.     Patient seen by psych for DELMI/agitation after finding out she was not able to go to rehab with COVID+ status. She is AAOx3 and has good insight into her overall condition. She states she would like to seek counseling to help her cope with her emotions.
DVT ppx: lovenox  DIET: DASH + CC  DISPO: dc to rehab when facility accepts her - had COVID+ status    Patient asking for outpatient mammogram and counseling - I urged her to find an outpatient PMD who could help arrange and follow up results of tests and referrals.     Patient seen by psych for DELMI/agitation after finding out she was not able to go to rehab with COVID+ status. She is AAOx3 and has good insight into her overall condition. She states she would like to seek counseling to help her cope with her emotions.
DVT ppx: lovenox  DIET: DASH + CC  DISPO: dc to rehab when facility accepts her - had COVID+ status

## 2023-11-12 NOTE — PROVIDER CONTACT NOTE (OTHER) - BACKGROUND
Patient admitted for difficulty walking
pt new admission with right leg wound
Admitted for PT eval, on isolation for COVID

## 2023-11-12 NOTE — PROVIDER CONTACT NOTE (OTHER) - DATE AND TIME:
07-Nov-2023 21:46
08-Nov-2023 23:58
09-Nov-2023 08:11
10-Nov-2023 06:14
12-Nov-2023 09:10
08-Nov-2023 15:11

## 2023-11-12 NOTE — PROVIDER CONTACT NOTE (OTHER) - REASON
Pt refuses assessment, refuses wound care
Rehab/Wound care
Patient refusing vital signs, medications, and care
Patient refusing vital signs and blood sugar checks
pt noncompliant
pt reports being recently diagnosed with a UTI, requesting test and medication

## 2023-11-12 NOTE — PROGRESS NOTE ADULT - PROBLEM SELECTOR PLAN 7
- asymptomatic, incidental COVID+ on discharge screen to rehab  - not requiring supplemental O2  - not septic
DVT ppx: lovenox  DIET: DASH + CC  DISPO: pending social work/case management intervention. Medically ready for dc.

## 2023-11-12 NOTE — PROGRESS NOTE ADULT - PROBLEM SELECTOR PLAN 6
FS stable  - not on home medications - reports being on Januvia in the past

## 2023-11-12 NOTE — PROGRESS NOTE ADULT - SUBJECTIVE AND OBJECTIVE BOX
[facility] Division of Hospital Medicine  Ton Mercado DO  Available via MS Teams  In house pager [phone]    SUBJECTIVE / OVERNIGHT EVENTS:  No acute events overnight.  When asked how she is, patient says, "I have COVID."  Denies fever, chills, cough, sob, difficulty breathing, n/v/d/c, abd pain, dysuria.    ADDITIONAL REVIEW OF SYSTEMS:    MEDICATIONS  (STANDING):  calamine/zinc oxide Lotion 1 Application(s) Topical daily  dextrose 50% Injectable 25 Gram(s) IV Push once  furosemide    Tablet 20 milliGRAM(s) Oral daily  heparin   Injectable 5000 Unit(s) SubCutaneous every 8 hours  insulin lispro (ADMELOG) corrective regimen sliding scale   SubCutaneous at bedtime  insulin lispro (ADMELOG) corrective regimen sliding scale   SubCutaneous three times a day before meals  nitrofurantoin monohydrate/macrocrystals (MACROBID) 100 milliGRAM(s) Oral two times a day    MEDICATIONS  (PRN):  OLANZapine Injectable 2.5 milliGRAM(s) IntraMuscular every 6 hours PRN Severe Agitation  QUEtiapine 25 milliGRAM(s) Oral every 6 hours PRN Agitation      I&O's Summary      PHYSICAL EXAM:  Vital Signs Last 24 Hrs  T(C): 36.4 (12 Nov 2023 05:40), Max: 36.6 (11 Nov 2023 22:45)  T(F): 97.5 (12 Nov 2023 05:40), Max: 97.8 (11 Nov 2023 22:45)  HR: 86 (12 Nov 2023 05:40) (86 - 89)  BP: 135/66 (12 Nov 2023 05:40) (135/66 - 156/84)  BP(mean): --  RR: 18 (12 Nov 2023 05:40) (18 - 18)  SpO2: 97% (12 Nov 2023 05:40) (97% - 100%)    Parameters below as of 12 Nov 2023 05:40  Patient On (Oxygen Delivery Method): room air      CONSTITUTIONAL: NAD, well-groomed  EYES: PERRLA; conjunctiva and sclera clear  ENMT: Moist oral mucosa, no pharyngeal injection or exudates; normal dentition  NECK: Supple, no palpable masses; no thyromegaly  RESPIRATORY: Normal respiratory effort; lungs are clear to auscultation bilaterally  CARDIOVASCULAR: Regular rate and rhythm, normal S1 and S2, no murmur/rub/gallop; Peripheral pulses are 2+ bilaterally; b/l LE chronic venous insufficiency with ulceration and wounds  ABDOMEN: Nontender to palpation, normoactive bowel sounds, no rebound/guarding; No hepatosplenomegaly  MUSCULOSKELETAL:   no clubbing or cyanosis of digits; no joint swelling or tenderness to palpation  PSYCH: A+O to person, place, and time; affect appropriate  NEUROLOGY: CN 2-12 are intact and symmetric; no gross sensory deficits   SKIN: No rashes; no palpable lesions    LABS:                    SARS-CoV-2: Detected (11 Nov 2023 06:12)  COVID-19 PCR: Detected (10 Nov 2023 10:27)  
[facility] Division of Hospital Medicine  Ton Mercado DO  Available via MS Teams  In house pager [phone]    SUBJECTIVE / OVERNIGHT EVENTS:  No acute events overnight. Seen and examined at bedside this morning.  No acute complaints. Says she is in a good mood because she spoke to her ex-, and he is in penitentiary.    ADDITIONAL REVIEW OF SYSTEMS:    MEDICATIONS  (STANDING):  dextrose 50% Injectable 25 Gram(s) IV Push once  furosemide    Tablet 20 milliGRAM(s) Oral daily  heparin   Injectable 5000 Unit(s) SubCutaneous every 8 hours  insulin lispro (ADMELOG) corrective regimen sliding scale   SubCutaneous at bedtime  insulin lispro (ADMELOG) corrective regimen sliding scale   SubCutaneous three times a day before meals  nitrofurantoin monohydrate/macrocrystals (MACROBID) 100 milliGRAM(s) Oral two times a day    MEDICATIONS  (PRN):      I&O's Summary      PHYSICAL EXAM:  Vital Signs Last 24 Hrs  T(C): 36.9 (10 Nov 2023 12:22), Max: 37.1 (09 Nov 2023 20:47)  T(F): 98.4 (10 Nov 2023 12:22), Max: 98.8 (09 Nov 2023 20:47)  HR: 99 (10 Nov 2023 12:22) (99 - 100)  BP: 147/67 (10 Nov 2023 12:22) (146/62 - 147/67)  BP(mean): --  RR: 17 (10 Nov 2023 12:22) (17 - 17)  SpO2: 100% (10 Nov 2023 12:22) (100% - 100%)    Parameters below as of 10 Nov 2023 12:22  Patient On (Oxygen Delivery Method): room air      CONSTITUTIONAL: NAD, well-groomed  EYES: PERRLA; conjunctiva and sclera clear  ENMT: Moist oral mucosa, no pharyngeal injection or exudates; normal dentition  NECK: Supple, no palpable masses; no thyromegaly  RESPIRATORY: Normal respiratory effort; lungs are clear to auscultation bilaterally  CARDIOVASCULAR: Regular rate and rhythm, normal S1 and S2, no murmur/rub/gallop; Peripheral pulses are 2+ bilaterally; b/l LE chronic venous insufficiency with ulceration and wounds  ABDOMEN: Nontender to palpation, normoactive bowel sounds, no rebound/guarding; No hepatosplenomegaly  MUSCULOSKELETAL:   no clubbing or cyanosis of digits; no joint swelling or tenderness to palpation  PSYCH: A+O to person, place, and time; affect appropriate  NEUROLOGY: CN 2-12 are intact and symmetric; no gross sensory deficits   SKIN: No rashes; no palpable lesions    LABS:                    COVID-19 PCR: Detected (10 Nov 2023 10:27)  
[facility] Division of Hospital Medicine  Ton Mercado   Available via MS Teams  In house pager [phone]    SUBJECTIVE / OVERNIGHT EVENTS:  No acute events overnight.  Seen and examined at bedside this morning.  She stated that she was diagnosed with a UTI outpatient and was given nitrofurantoin but lost the medication so she could not take any of it.  Called her pharmacy while at bedside - there was a prescription for nitrofurantoin.  Checked outpatient [facility] records - UA and UCx with Ecoli seen.    ADDITIONAL REVIEW OF SYSTEMS:    MEDICATIONS  (STANDING):  dextrose 50% Injectable 25 Gram(s) IV Push once  furosemide    Tablet 20 milliGRAM(s) Oral daily  heparin   Injectable 5000 Unit(s) SubCutaneous every 8 hours  insulin lispro (ADMELOG) corrective regimen sliding scale   SubCutaneous three times a day before meals  insulin lispro (ADMELOG) corrective regimen sliding scale   SubCutaneous at bedtime  nitrofurantoin monohydrate/macrocrystals (MACROBID) 100 milliGRAM(s) Oral two times a day    MEDICATIONS  (PRN):      I&O's Summary      PHYSICAL EXAM:  Vital Signs Last 24 Hrs  T(C): 36.7 (08 Nov 2023 19:15), Max: 36.7 (08 Nov 2023 19:15)  T(F): 98.1 (08 Nov 2023 19:15), Max: 98.1 (08 Nov 2023 19:15)  HR: 72 (08 Nov 2023 19:15) (72 - 79)  BP: 132/62 (08 Nov 2023 19:15) (132/62 - 149/77)  BP(mean): --  RR: 17 (08 Nov 2023 19:15) (17 - 18)  SpO2: 99% (08 Nov 2023 19:15) (98% - 99%)    Parameters below as of 08 Nov 2023 19:15  Patient On (Oxygen Delivery Method): room air      CONSTITUTIONAL: NAD, well-groomed  EYES: PERRLA; conjunctiva and sclera clear  ENMT: Moist oral mucosa, no pharyngeal injection or exudates; normal dentition  NECK: Supple, no palpable masses; no thyromegaly  RESPIRATORY: Normal respiratory effort; lungs are clear to auscultation bilaterally  CARDIOVASCULAR: Regular rate and rhythm, normal S1 and S2, no murmur/rub/gallop; Peripheral pulses are 2+ bilaterally; b/l LE chronic venous insufficiency with ulceration and wounds  ABDOMEN: Nontender to palpation, normoactive bowel sounds, no rebound/guarding; No hepatosplenomegaly  MUSCULOSKELETAL:   no clubbing or cyanosis of digits; no joint swelling or tenderness to palpation  PSYCH: A+O to person, place, and time; affect appropriate  NEUROLOGY: CN 2-12 are intact and symmetric; no gross sensory deficits   SKIN: No rashes; no palpable lesions    LABS:                        9.3    6.82  )-----------( 308      ( 07 Nov 2023 23:17 )             30.2     11-07    134<L>  |  101  |  28<H>  ----------------------------<  136<H>  4.6   |  20<L>  |  0.95    Ca    8.9      07 Nov 2023 23:17    TPro  8.7<H>  /  Alb  3.4  /  TBili  0.4  /  DBili  x   /  AST  30  /  ALT  18  /  AlkPhos  108  11-07          Urinalysis Basic - ( 07 Nov 2023 23:17 )    Color: x / Appearance: x / SG: x / pH: x  Gluc: 136 mg/dL / Ketone: x  / Bili: x / Urobili: x   Blood: x / Protein: x / Nitrite: x   Leuk Esterase: x / RBC: x / WBC x   Sq Epi: x / Non Sq Epi: x / Bacteria: x        
[facility] Division of Hospital Medicine  Ton Mercado   Available via MS Teams  In house pager [phone]    SUBJECTIVE / OVERNIGHT EVENTS:  Yesterday patient testing positive for COVID - she believes she does not have it. Wants documentation of such - I asked her to go through medical records.  Feels well otherwise. Denies acute complaints. No fever, chills, cough, SOB, n/v/d/c, abd pain, dysuria.   She had become upset yesterday after finding out she could not leave due to the COVID infection needing isolation.      ADDITIONAL REVIEW OF SYSTEMS:    MEDICATIONS  (STANDING):  dextrose 50% Injectable 25 Gram(s) IV Push once  furosemide    Tablet 20 milliGRAM(s) Oral daily  heparin   Injectable 5000 Unit(s) SubCutaneous every 8 hours  insulin lispro (ADMELOG) corrective regimen sliding scale   SubCutaneous three times a day before meals  insulin lispro (ADMELOG) corrective regimen sliding scale   SubCutaneous at bedtime  nitrofurantoin monohydrate/macrocrystals (MACROBID) 100 milliGRAM(s) Oral two times a day    MEDICATIONS  (PRN):  OLANZapine Injectable 2.5 milliGRAM(s) IntraMuscular every 6 hours PRN Severe Agitation  QUEtiapine 25 milliGRAM(s) Oral every 6 hours PRN Agitation      I&O's Summary      PHYSICAL EXAM:  Vital Signs Last 24 Hrs  T(C): 36.7 (11 Nov 2023 05:39), Max: 37 (10 Nov 2023 21:30)  T(F): 98 (11 Nov 2023 05:39), Max: 98.6 (10 Nov 2023 21:30)  HR: 100 (11 Nov 2023 05:39) (79 - 100)  BP: 133/60 (11 Nov 2023 05:39) (133/60 - 142/63)  BP(mean): --  RR: 16 (11 Nov 2023 05:39) (16 - 16)  SpO2: 100% (11 Nov 2023 05:39) (97% - 100%)    Parameters below as of 10 Nov 2023 21:30  Patient On (Oxygen Delivery Method): room air      CONSTITUTIONAL: NAD, well-groomed  EYES: PERRLA; conjunctiva and sclera clear  ENMT: Moist oral mucosa, no pharyngeal injection or exudates; normal dentition  NECK: Supple, no palpable masses; no thyromegaly  RESPIRATORY: Normal respiratory effort; lungs are clear to auscultation bilaterally  CARDIOVASCULAR: Regular rate and rhythm, normal S1 and S2, no murmur/rub/gallop; Peripheral pulses are 2+ bilaterally; b/l LE chronic venous insufficiency with ulceration and wounds  ABDOMEN: Nontender to palpation, normoactive bowel sounds, no rebound/guarding; No hepatosplenomegaly  MUSCULOSKELETAL:   no clubbing or cyanosis of digits; no joint swelling or tenderness to palpation  PSYCH: A+O to person, place, and time; affect appropriate  NEUROLOGY: CN 2-12 are intact and symmetric; no gross sensory deficits   SKIN: No rashes; no palpable lesions  LABS:                    SARS-CoV-2: Detected (11 Nov 2023 06:12)  COVID-19 PCR: Detected (10 Nov 2023 10:27)

## 2023-11-13 VITALS
OXYGEN SATURATION: 100 % | TEMPERATURE: 98 F | SYSTOLIC BLOOD PRESSURE: 115 MMHG | RESPIRATION RATE: 16 BRPM | DIASTOLIC BLOOD PRESSURE: 81 MMHG | HEART RATE: 86 BPM

## 2023-11-13 DIAGNOSIS — L29.9 PRURITUS, UNSPECIFIED: ICD-10-CM

## 2023-11-13 LAB
ALBUMIN SERPL ELPH-MCNC: 3 G/DL — LOW (ref 3.3–5)
ALBUMIN SERPL ELPH-MCNC: 3 G/DL — LOW (ref 3.3–5)
ALP SERPL-CCNC: 102 U/L — SIGNIFICANT CHANGE UP (ref 40–120)
ALP SERPL-CCNC: 102 U/L — SIGNIFICANT CHANGE UP (ref 40–120)
ALT FLD-CCNC: 14 U/L — SIGNIFICANT CHANGE UP (ref 4–33)
ALT FLD-CCNC: 14 U/L — SIGNIFICANT CHANGE UP (ref 4–33)
ANION GAP SERPL CALC-SCNC: 9 MMOL/L — SIGNIFICANT CHANGE UP (ref 7–14)
ANION GAP SERPL CALC-SCNC: 9 MMOL/L — SIGNIFICANT CHANGE UP (ref 7–14)
AST SERPL-CCNC: 15 U/L — SIGNIFICANT CHANGE UP (ref 4–32)
AST SERPL-CCNC: 15 U/L — SIGNIFICANT CHANGE UP (ref 4–32)
BASOPHILS # BLD AUTO: 0.04 K/UL — SIGNIFICANT CHANGE UP (ref 0–0.2)
BASOPHILS # BLD AUTO: 0.04 K/UL — SIGNIFICANT CHANGE UP (ref 0–0.2)
BASOPHILS NFR BLD AUTO: 0.7 % — SIGNIFICANT CHANGE UP (ref 0–2)
BASOPHILS NFR BLD AUTO: 0.7 % — SIGNIFICANT CHANGE UP (ref 0–2)
BILIRUB SERPL-MCNC: 0.2 MG/DL — SIGNIFICANT CHANGE UP (ref 0.2–1.2)
BILIRUB SERPL-MCNC: 0.2 MG/DL — SIGNIFICANT CHANGE UP (ref 0.2–1.2)
BUN SERPL-MCNC: 24 MG/DL — HIGH (ref 7–23)
BUN SERPL-MCNC: 24 MG/DL — HIGH (ref 7–23)
CALCIUM SERPL-MCNC: 8.7 MG/DL — SIGNIFICANT CHANGE UP (ref 8.4–10.5)
CALCIUM SERPL-MCNC: 8.7 MG/DL — SIGNIFICANT CHANGE UP (ref 8.4–10.5)
CHLORIDE SERPL-SCNC: 106 MMOL/L — SIGNIFICANT CHANGE UP (ref 98–107)
CHLORIDE SERPL-SCNC: 106 MMOL/L — SIGNIFICANT CHANGE UP (ref 98–107)
CO2 SERPL-SCNC: 23 MMOL/L — SIGNIFICANT CHANGE UP (ref 22–31)
CO2 SERPL-SCNC: 23 MMOL/L — SIGNIFICANT CHANGE UP (ref 22–31)
CREAT SERPL-MCNC: 0.89 MG/DL — SIGNIFICANT CHANGE UP (ref 0.5–1.3)
CREAT SERPL-MCNC: 0.89 MG/DL — SIGNIFICANT CHANGE UP (ref 0.5–1.3)
CRP SERPL-MCNC: 16.9 MG/L — HIGH
CRP SERPL-MCNC: 16.9 MG/L — HIGH
D DIMER BLD IA.RAPID-MCNC: 856 NG/ML DDU — HIGH
D DIMER BLD IA.RAPID-MCNC: 856 NG/ML DDU — HIGH
EGFR: 74 ML/MIN/1.73M2 — SIGNIFICANT CHANGE UP
EGFR: 74 ML/MIN/1.73M2 — SIGNIFICANT CHANGE UP
EOSINOPHIL # BLD AUTO: 0.12 K/UL — SIGNIFICANT CHANGE UP (ref 0–0.5)
EOSINOPHIL # BLD AUTO: 0.12 K/UL — SIGNIFICANT CHANGE UP (ref 0–0.5)
EOSINOPHIL NFR BLD AUTO: 2.1 % — SIGNIFICANT CHANGE UP (ref 0–6)
EOSINOPHIL NFR BLD AUTO: 2.1 % — SIGNIFICANT CHANGE UP (ref 0–6)
FERRITIN SERPL-MCNC: 55 NG/ML — SIGNIFICANT CHANGE UP (ref 13–330)
FERRITIN SERPL-MCNC: 55 NG/ML — SIGNIFICANT CHANGE UP (ref 13–330)
GLUCOSE BLDC GLUCOMTR-MCNC: 129 MG/DL — HIGH (ref 70–99)
GLUCOSE BLDC GLUCOMTR-MCNC: 129 MG/DL — HIGH (ref 70–99)
GLUCOSE BLDC GLUCOMTR-MCNC: 131 MG/DL — HIGH (ref 70–99)
GLUCOSE BLDC GLUCOMTR-MCNC: 131 MG/DL — HIGH (ref 70–99)
GLUCOSE BLDC GLUCOMTR-MCNC: 136 MG/DL — HIGH (ref 70–99)
GLUCOSE BLDC GLUCOMTR-MCNC: 136 MG/DL — HIGH (ref 70–99)
GLUCOSE SERPL-MCNC: 156 MG/DL — HIGH (ref 70–99)
GLUCOSE SERPL-MCNC: 156 MG/DL — HIGH (ref 70–99)
HCT VFR BLD CALC: 28.8 % — LOW (ref 34.5–45)
HCT VFR BLD CALC: 28.8 % — LOW (ref 34.5–45)
HGB BLD-MCNC: 9 G/DL — LOW (ref 11.5–15.5)
HGB BLD-MCNC: 9 G/DL — LOW (ref 11.5–15.5)
IANC: 3.27 K/UL — SIGNIFICANT CHANGE UP (ref 1.8–7.4)
IANC: 3.27 K/UL — SIGNIFICANT CHANGE UP (ref 1.8–7.4)
IMM GRANULOCYTES NFR BLD AUTO: 0.3 % — SIGNIFICANT CHANGE UP (ref 0–0.9)
IMM GRANULOCYTES NFR BLD AUTO: 0.3 % — SIGNIFICANT CHANGE UP (ref 0–0.9)
LYMPHOCYTES # BLD AUTO: 1.83 K/UL — SIGNIFICANT CHANGE UP (ref 1–3.3)
LYMPHOCYTES # BLD AUTO: 1.83 K/UL — SIGNIFICANT CHANGE UP (ref 1–3.3)
LYMPHOCYTES # BLD AUTO: 31.6 % — SIGNIFICANT CHANGE UP (ref 13–44)
LYMPHOCYTES # BLD AUTO: 31.6 % — SIGNIFICANT CHANGE UP (ref 13–44)
MAGNESIUM SERPL-MCNC: 2 MG/DL — SIGNIFICANT CHANGE UP (ref 1.6–2.6)
MAGNESIUM SERPL-MCNC: 2 MG/DL — SIGNIFICANT CHANGE UP (ref 1.6–2.6)
MCHC RBC-ENTMCNC: 21.8 PG — LOW (ref 27–34)
MCHC RBC-ENTMCNC: 21.8 PG — LOW (ref 27–34)
MCHC RBC-ENTMCNC: 31.3 GM/DL — LOW (ref 32–36)
MCHC RBC-ENTMCNC: 31.3 GM/DL — LOW (ref 32–36)
MCV RBC AUTO: 69.9 FL — LOW (ref 80–100)
MCV RBC AUTO: 69.9 FL — LOW (ref 80–100)
MONOCYTES # BLD AUTO: 0.51 K/UL — SIGNIFICANT CHANGE UP (ref 0–0.9)
MONOCYTES # BLD AUTO: 0.51 K/UL — SIGNIFICANT CHANGE UP (ref 0–0.9)
MONOCYTES NFR BLD AUTO: 8.8 % — SIGNIFICANT CHANGE UP (ref 2–14)
MONOCYTES NFR BLD AUTO: 8.8 % — SIGNIFICANT CHANGE UP (ref 2–14)
NEUTROPHILS # BLD AUTO: 3.27 K/UL — SIGNIFICANT CHANGE UP (ref 1.8–7.4)
NEUTROPHILS # BLD AUTO: 3.27 K/UL — SIGNIFICANT CHANGE UP (ref 1.8–7.4)
NEUTROPHILS NFR BLD AUTO: 56.5 % — SIGNIFICANT CHANGE UP (ref 43–77)
NEUTROPHILS NFR BLD AUTO: 56.5 % — SIGNIFICANT CHANGE UP (ref 43–77)
NRBC # BLD: 0 /100 WBCS — SIGNIFICANT CHANGE UP (ref 0–0)
NRBC # BLD: 0 /100 WBCS — SIGNIFICANT CHANGE UP (ref 0–0)
NRBC # FLD: 0 K/UL — SIGNIFICANT CHANGE UP (ref 0–0)
NRBC # FLD: 0 K/UL — SIGNIFICANT CHANGE UP (ref 0–0)
PHOSPHATE SERPL-MCNC: 3.5 MG/DL — SIGNIFICANT CHANGE UP (ref 2.5–4.5)
PHOSPHATE SERPL-MCNC: 3.5 MG/DL — SIGNIFICANT CHANGE UP (ref 2.5–4.5)
PLATELET # BLD AUTO: 253 K/UL — SIGNIFICANT CHANGE UP (ref 150–400)
PLATELET # BLD AUTO: 253 K/UL — SIGNIFICANT CHANGE UP (ref 150–400)
POTASSIUM SERPL-MCNC: 4 MMOL/L — SIGNIFICANT CHANGE UP (ref 3.5–5.3)
POTASSIUM SERPL-MCNC: 4 MMOL/L — SIGNIFICANT CHANGE UP (ref 3.5–5.3)
POTASSIUM SERPL-SCNC: 4 MMOL/L — SIGNIFICANT CHANGE UP (ref 3.5–5.3)
POTASSIUM SERPL-SCNC: 4 MMOL/L — SIGNIFICANT CHANGE UP (ref 3.5–5.3)
PROCALCITONIN SERPL-MCNC: 0.04 NG/ML — SIGNIFICANT CHANGE UP (ref 0.02–0.1)
PROCALCITONIN SERPL-MCNC: 0.04 NG/ML — SIGNIFICANT CHANGE UP (ref 0.02–0.1)
PROT SERPL-MCNC: 8.4 G/DL — HIGH (ref 6–8.3)
PROT SERPL-MCNC: 8.4 G/DL — HIGH (ref 6–8.3)
RBC # BLD: 4.12 M/UL — SIGNIFICANT CHANGE UP (ref 3.8–5.2)
RBC # BLD: 4.12 M/UL — SIGNIFICANT CHANGE UP (ref 3.8–5.2)
RBC # FLD: 19.7 % — HIGH (ref 10.3–14.5)
RBC # FLD: 19.7 % — HIGH (ref 10.3–14.5)
SODIUM SERPL-SCNC: 138 MMOL/L — SIGNIFICANT CHANGE UP (ref 135–145)
SODIUM SERPL-SCNC: 138 MMOL/L — SIGNIFICANT CHANGE UP (ref 135–145)
WBC # BLD: 5.79 K/UL — SIGNIFICANT CHANGE UP (ref 3.8–10.5)
WBC # BLD: 5.79 K/UL — SIGNIFICANT CHANGE UP (ref 3.8–10.5)
WBC # FLD AUTO: 5.79 K/UL — SIGNIFICANT CHANGE UP (ref 3.8–10.5)
WBC # FLD AUTO: 5.79 K/UL — SIGNIFICANT CHANGE UP (ref 3.8–10.5)

## 2023-11-13 PROCEDURE — 99239 HOSP IP/OBS DSCHRG MGMT >30: CPT

## 2023-11-13 RX ORDER — CALAMINE AND ZINC OXIDE AND PHENOL 160; 10 MG/ML; MG/ML
1 LOTION TOPICAL
Refills: 0 | Status: DISCONTINUED | OUTPATIENT
Start: 2023-11-13 | End: 2023-11-13

## 2023-11-13 RX ORDER — CALAMINE AND ZINC OXIDE AND PHENOL 160; 10 MG/ML; MG/ML
1 LOTION TOPICAL
Qty: 0 | Refills: 0 | DISCHARGE
Start: 2023-11-13

## 2023-11-13 RX ORDER — NITROFURANTOIN MACROCRYSTAL 50 MG
1 CAPSULE ORAL
Qty: 2 | Refills: 0
Start: 2023-11-13 | End: 2023-11-13

## 2023-11-13 RX ORDER — ENOXAPARIN SODIUM 100 MG/ML
40 INJECTION SUBCUTANEOUS EVERY 24 HOURS
Refills: 0 | Status: DISCONTINUED | OUTPATIENT
Start: 2023-11-13 | End: 2023-11-13

## 2023-11-13 RX ADMIN — Medication 100 MILLIGRAM(S): at 18:11

## 2023-11-13 RX ADMIN — CALAMINE AND ZINC OXIDE AND PHENOL 1 APPLICATION(S): 160; 10 LOTION TOPICAL at 13:40

## 2023-11-13 RX ADMIN — Medication 100 MILLIGRAM(S): at 05:35

## 2023-12-02 ENCOUNTER — INPATIENT (INPATIENT)
Facility: HOSPITAL | Age: 63
LOS: 15 days | Discharge: NURSING, REHAB OR LONG-TERM CARE | End: 2023-12-18
Attending: STUDENT IN AN ORGANIZED HEALTH CARE EDUCATION/TRAINING PROGRAM | Admitting: STUDENT IN AN ORGANIZED HEALTH CARE EDUCATION/TRAINING PROGRAM
Payer: SELF-PAY

## 2023-12-02 VITALS
TEMPERATURE: 98 F | OXYGEN SATURATION: 98 % | HEART RATE: 98 BPM | HEIGHT: 65 IN | RESPIRATION RATE: 18 BRPM | SYSTOLIC BLOOD PRESSURE: 114 MMHG | DIASTOLIC BLOOD PRESSURE: 57 MMHG

## 2023-12-02 DIAGNOSIS — N17.9 ACUTE KIDNEY FAILURE, UNSPECIFIED: ICD-10-CM

## 2023-12-02 PROBLEM — E11.9 TYPE 2 DIABETES MELLITUS WITHOUT COMPLICATIONS: Chronic | Status: ACTIVE | Noted: 2023-10-31

## 2023-12-02 PROBLEM — I10 ESSENTIAL (PRIMARY) HYPERTENSION: Chronic | Status: ACTIVE | Noted: 2023-10-31

## 2023-12-02 LAB
ALBUMIN SERPL ELPH-MCNC: 2.4 G/DL — LOW (ref 3.3–5)
ALBUMIN SERPL ELPH-MCNC: 2.4 G/DL — LOW (ref 3.3–5)
ALBUMIN SERPL ELPH-MCNC: 2.5 G/DL — LOW (ref 3.3–5)
ALBUMIN SERPL ELPH-MCNC: 2.5 G/DL — LOW (ref 3.3–5)
ALBUMIN SERPL ELPH-MCNC: 2.8 G/DL — LOW (ref 3.3–5)
ALBUMIN SERPL ELPH-MCNC: 2.8 G/DL — LOW (ref 3.3–5)
ALP SERPL-CCNC: 107 U/L — SIGNIFICANT CHANGE UP (ref 40–120)
ALP SERPL-CCNC: 107 U/L — SIGNIFICANT CHANGE UP (ref 40–120)
ALP SERPL-CCNC: 114 U/L — SIGNIFICANT CHANGE UP (ref 40–120)
ALP SERPL-CCNC: 114 U/L — SIGNIFICANT CHANGE UP (ref 40–120)
ALP SERPL-CCNC: 116 U/L — SIGNIFICANT CHANGE UP (ref 40–120)
ALP SERPL-CCNC: 116 U/L — SIGNIFICANT CHANGE UP (ref 40–120)
ALT FLD-CCNC: 33 U/L — SIGNIFICANT CHANGE UP (ref 4–33)
ALT FLD-CCNC: 41 U/L — HIGH (ref 4–33)
ALT FLD-CCNC: 41 U/L — HIGH (ref 4–33)
ANION GAP SERPL CALC-SCNC: 16 MMOL/L — HIGH (ref 7–14)
ANION GAP SERPL CALC-SCNC: 16 MMOL/L — HIGH (ref 7–14)
ANION GAP SERPL CALC-SCNC: 18 MMOL/L — HIGH (ref 7–14)
APPEARANCE UR: ABNORMAL
APPEARANCE UR: ABNORMAL
APTT BLD: 27.3 SEC — SIGNIFICANT CHANGE UP (ref 24.5–35.6)
APTT BLD: 27.3 SEC — SIGNIFICANT CHANGE UP (ref 24.5–35.6)
AST SERPL-CCNC: 21 U/L — SIGNIFICANT CHANGE UP (ref 4–32)
AST SERPL-CCNC: 21 U/L — SIGNIFICANT CHANGE UP (ref 4–32)
AST SERPL-CCNC: 22 U/L — SIGNIFICANT CHANGE UP (ref 4–32)
AST SERPL-CCNC: 22 U/L — SIGNIFICANT CHANGE UP (ref 4–32)
AST SERPL-CCNC: 44 U/L — HIGH (ref 4–32)
AST SERPL-CCNC: 44 U/L — HIGH (ref 4–32)
BACTERIA # UR AUTO: ABNORMAL /HPF
BACTERIA # UR AUTO: ABNORMAL /HPF
BASE EXCESS BLDV CALC-SCNC: -10.3 MMOL/L — LOW (ref -2–3)
BASE EXCESS BLDV CALC-SCNC: -10.3 MMOL/L — LOW (ref -2–3)
BASE EXCESS BLDV CALC-SCNC: -9.6 MMOL/L — LOW (ref -2–3)
BASE EXCESS BLDV CALC-SCNC: -9.6 MMOL/L — LOW (ref -2–3)
BASOPHILS # BLD AUTO: 0.03 K/UL — SIGNIFICANT CHANGE UP (ref 0–0.2)
BASOPHILS # BLD AUTO: 0.03 K/UL — SIGNIFICANT CHANGE UP (ref 0–0.2)
BASOPHILS # BLD AUTO: 0.04 K/UL — SIGNIFICANT CHANGE UP (ref 0–0.2)
BASOPHILS # BLD AUTO: 0.04 K/UL — SIGNIFICANT CHANGE UP (ref 0–0.2)
BASOPHILS NFR BLD AUTO: 0.2 % — SIGNIFICANT CHANGE UP (ref 0–2)
BASOPHILS NFR BLD AUTO: 0.2 % — SIGNIFICANT CHANGE UP (ref 0–2)
BASOPHILS NFR BLD AUTO: 0.3 % — SIGNIFICANT CHANGE UP (ref 0–2)
BASOPHILS NFR BLD AUTO: 0.3 % — SIGNIFICANT CHANGE UP (ref 0–2)
BILIRUB SERPL-MCNC: 0.4 MG/DL — SIGNIFICANT CHANGE UP (ref 0.2–1.2)
BILIRUB SERPL-MCNC: 0.4 MG/DL — SIGNIFICANT CHANGE UP (ref 0.2–1.2)
BILIRUB SERPL-MCNC: 0.5 MG/DL — SIGNIFICANT CHANGE UP (ref 0.2–1.2)
BILIRUB UR-MCNC: NEGATIVE — SIGNIFICANT CHANGE UP
BILIRUB UR-MCNC: NEGATIVE — SIGNIFICANT CHANGE UP
BLOOD GAS VENOUS COMPREHENSIVE RESULT: SIGNIFICANT CHANGE UP
BLOOD GAS VENOUS COMPREHENSIVE RESULT: SIGNIFICANT CHANGE UP
BUN SERPL-MCNC: 87 MG/DL — HIGH (ref 7–23)
BUN SERPL-MCNC: 87 MG/DL — HIGH (ref 7–23)
BUN SERPL-MCNC: 91 MG/DL — HIGH (ref 7–23)
BUN SERPL-MCNC: 91 MG/DL — HIGH (ref 7–23)
BUN SERPL-MCNC: 92 MG/DL — HIGH (ref 7–23)
BUN SERPL-MCNC: 92 MG/DL — HIGH (ref 7–23)
CA-I SERPL-SCNC: 1.07 MMOL/L — LOW (ref 1.15–1.33)
CALCIUM SERPL-MCNC: 8 MG/DL — LOW (ref 8.4–10.5)
CALCIUM SERPL-MCNC: 8.1 MG/DL — LOW (ref 8.4–10.5)
CALCIUM SERPL-MCNC: 8.1 MG/DL — LOW (ref 8.4–10.5)
CAST: 0 /LPF — SIGNIFICANT CHANGE UP (ref 0–4)
CAST: 0 /LPF — SIGNIFICANT CHANGE UP (ref 0–4)
CHLORIDE BLDV-SCNC: 100 MMOL/L — SIGNIFICANT CHANGE UP (ref 96–108)
CHLORIDE BLDV-SCNC: 100 MMOL/L — SIGNIFICANT CHANGE UP (ref 96–108)
CHLORIDE BLDV-SCNC: 101 MMOL/L — SIGNIFICANT CHANGE UP (ref 96–108)
CHLORIDE BLDV-SCNC: 101 MMOL/L — SIGNIFICANT CHANGE UP (ref 96–108)
CHLORIDE SERPL-SCNC: 95 MMOL/L — LOW (ref 98–107)
CHLORIDE SERPL-SCNC: 95 MMOL/L — LOW (ref 98–107)
CHLORIDE SERPL-SCNC: 97 MMOL/L — LOW (ref 98–107)
CHLORIDE SERPL-SCNC: 97 MMOL/L — LOW (ref 98–107)
CHLORIDE SERPL-SCNC: 98 MMOL/L — SIGNIFICANT CHANGE UP (ref 98–107)
CHLORIDE SERPL-SCNC: 98 MMOL/L — SIGNIFICANT CHANGE UP (ref 98–107)
CO2 BLDV-SCNC: 16.2 MMOL/L — LOW (ref 22–26)
CO2 BLDV-SCNC: 16.2 MMOL/L — LOW (ref 22–26)
CO2 BLDV-SCNC: 17.5 MMOL/L — LOW (ref 22–26)
CO2 BLDV-SCNC: 17.5 MMOL/L — LOW (ref 22–26)
CO2 SERPL-SCNC: 14 MMOL/L — LOW (ref 22–31)
CO2 SERPL-SCNC: 14 MMOL/L — LOW (ref 22–31)
CO2 SERPL-SCNC: 15 MMOL/L — LOW (ref 22–31)
COLOR SPEC: YELLOW — SIGNIFICANT CHANGE UP
COLOR SPEC: YELLOW — SIGNIFICANT CHANGE UP
CREAT SERPL-MCNC: 4.43 MG/DL — HIGH (ref 0.5–1.3)
CREAT SERPL-MCNC: 4.43 MG/DL — HIGH (ref 0.5–1.3)
CREAT SERPL-MCNC: 4.66 MG/DL — HIGH (ref 0.5–1.3)
CREAT SERPL-MCNC: 4.66 MG/DL — HIGH (ref 0.5–1.3)
CREAT SERPL-MCNC: 4.67 MG/DL — HIGH (ref 0.5–1.3)
CREAT SERPL-MCNC: 4.67 MG/DL — HIGH (ref 0.5–1.3)
DIFF PNL FLD: ABNORMAL
DIFF PNL FLD: ABNORMAL
EGFR: 10 ML/MIN/1.73M2 — LOW
EGFR: 11 ML/MIN/1.73M2 — LOW
EGFR: 11 ML/MIN/1.73M2 — LOW
EOSINOPHIL # BLD AUTO: 0.05 K/UL — SIGNIFICANT CHANGE UP (ref 0–0.5)
EOSINOPHIL # BLD AUTO: 0.05 K/UL — SIGNIFICANT CHANGE UP (ref 0–0.5)
EOSINOPHIL # BLD AUTO: 0.07 K/UL — SIGNIFICANT CHANGE UP (ref 0–0.5)
EOSINOPHIL # BLD AUTO: 0.07 K/UL — SIGNIFICANT CHANGE UP (ref 0–0.5)
EOSINOPHIL NFR BLD AUTO: 0.4 % — SIGNIFICANT CHANGE UP (ref 0–6)
EOSINOPHIL NFR BLD AUTO: 0.4 % — SIGNIFICANT CHANGE UP (ref 0–6)
EOSINOPHIL NFR BLD AUTO: 0.5 % — SIGNIFICANT CHANGE UP (ref 0–6)
EOSINOPHIL NFR BLD AUTO: 0.5 % — SIGNIFICANT CHANGE UP (ref 0–6)
GAS PNL BLDV: 128 MMOL/L — LOW (ref 136–145)
GAS PNL BLDV: 128 MMOL/L — LOW (ref 136–145)
GAS PNL BLDV: 129 MMOL/L — LOW (ref 136–145)
GAS PNL BLDV: 129 MMOL/L — LOW (ref 136–145)
GAS PNL BLDV: SIGNIFICANT CHANGE UP
GLUCOSE BLDC GLUCOMTR-MCNC: 170 MG/DL — HIGH (ref 70–99)
GLUCOSE BLDC GLUCOMTR-MCNC: 170 MG/DL — HIGH (ref 70–99)
GLUCOSE BLDV-MCNC: 165 MG/DL — HIGH (ref 70–99)
GLUCOSE BLDV-MCNC: 165 MG/DL — HIGH (ref 70–99)
GLUCOSE BLDV-MCNC: 169 MG/DL — HIGH (ref 70–99)
GLUCOSE BLDV-MCNC: 169 MG/DL — HIGH (ref 70–99)
GLUCOSE SERPL-MCNC: 157 MG/DL — HIGH (ref 70–99)
GLUCOSE SERPL-MCNC: 168 MG/DL — HIGH (ref 70–99)
GLUCOSE SERPL-MCNC: 168 MG/DL — HIGH (ref 70–99)
GLUCOSE UR QL: NEGATIVE MG/DL — SIGNIFICANT CHANGE UP
GLUCOSE UR QL: NEGATIVE MG/DL — SIGNIFICANT CHANGE UP
HCO3 BLDV-SCNC: 15 MMOL/L — LOW (ref 22–29)
HCO3 BLDV-SCNC: 15 MMOL/L — LOW (ref 22–29)
HCO3 BLDV-SCNC: 16 MMOL/L — LOW (ref 22–29)
HCO3 BLDV-SCNC: 16 MMOL/L — LOW (ref 22–29)
HCT VFR BLD CALC: 23.7 % — LOW (ref 34.5–45)
HCT VFR BLD CALC: 23.7 % — LOW (ref 34.5–45)
HCT VFR BLD CALC: 25.2 % — LOW (ref 34.5–45)
HCT VFR BLD CALC: 25.2 % — LOW (ref 34.5–45)
HCT VFR BLDA CALC: 25 % — LOW (ref 34.5–46.5)
HGB BLD CALC-MCNC: 8.2 G/DL — LOW (ref 11.7–16.1)
HGB BLD CALC-MCNC: 8.2 G/DL — LOW (ref 11.7–16.1)
HGB BLD CALC-MCNC: 8.4 G/DL — LOW (ref 11.7–16.1)
HGB BLD CALC-MCNC: 8.4 G/DL — LOW (ref 11.7–16.1)
HGB BLD-MCNC: 7.9 G/DL — LOW (ref 11.5–15.5)
HGB BLD-MCNC: 7.9 G/DL — LOW (ref 11.5–15.5)
HGB BLD-MCNC: 8.1 G/DL — LOW (ref 11.5–15.5)
HGB BLD-MCNC: 8.1 G/DL — LOW (ref 11.5–15.5)
IANC: 11.33 K/UL — HIGH (ref 1.8–7.4)
IANC: 11.33 K/UL — HIGH (ref 1.8–7.4)
IANC: 11.96 K/UL — HIGH (ref 1.8–7.4)
IANC: 11.96 K/UL — HIGH (ref 1.8–7.4)
IMM GRANULOCYTES NFR BLD AUTO: 1 % — HIGH (ref 0–0.9)
IMM GRANULOCYTES NFR BLD AUTO: 1 % — HIGH (ref 0–0.9)
IMM GRANULOCYTES NFR BLD AUTO: 1.2 % — HIGH (ref 0–0.9)
IMM GRANULOCYTES NFR BLD AUTO: 1.2 % — HIGH (ref 0–0.9)
INR BLD: 1.18 RATIO — SIGNIFICANT CHANGE UP (ref 0.85–1.18)
INR BLD: 1.18 RATIO — SIGNIFICANT CHANGE UP (ref 0.85–1.18)
KETONES UR-MCNC: NEGATIVE MG/DL — SIGNIFICANT CHANGE UP
KETONES UR-MCNC: NEGATIVE MG/DL — SIGNIFICANT CHANGE UP
LACTATE BLDV-MCNC: 1.1 MMOL/L — SIGNIFICANT CHANGE UP (ref 0.5–2)
LACTATE BLDV-MCNC: 1.1 MMOL/L — SIGNIFICANT CHANGE UP (ref 0.5–2)
LACTATE BLDV-MCNC: 2.2 MMOL/L — HIGH (ref 0.5–2)
LACTATE BLDV-MCNC: 2.2 MMOL/L — HIGH (ref 0.5–2)
LEUKOCYTE ESTERASE UR-ACNC: ABNORMAL
LEUKOCYTE ESTERASE UR-ACNC: ABNORMAL
LYMPHOCYTES # BLD AUTO: 1.51 K/UL — SIGNIFICANT CHANGE UP (ref 1–3.3)
LYMPHOCYTES # BLD AUTO: 1.51 K/UL — SIGNIFICANT CHANGE UP (ref 1–3.3)
LYMPHOCYTES # BLD AUTO: 1.57 K/UL — SIGNIFICANT CHANGE UP (ref 1–3.3)
LYMPHOCYTES # BLD AUTO: 1.57 K/UL — SIGNIFICANT CHANGE UP (ref 1–3.3)
LYMPHOCYTES # BLD AUTO: 10.6 % — LOW (ref 13–44)
LYMPHOCYTES # BLD AUTO: 10.6 % — LOW (ref 13–44)
LYMPHOCYTES # BLD AUTO: 11 % — LOW (ref 13–44)
LYMPHOCYTES # BLD AUTO: 11 % — LOW (ref 13–44)
MCHC RBC-ENTMCNC: 21.5 PG — LOW (ref 27–34)
MCHC RBC-ENTMCNC: 21.5 PG — LOW (ref 27–34)
MCHC RBC-ENTMCNC: 21.6 PG — LOW (ref 27–34)
MCHC RBC-ENTMCNC: 21.6 PG — LOW (ref 27–34)
MCHC RBC-ENTMCNC: 32.1 GM/DL — SIGNIFICANT CHANGE UP (ref 32–36)
MCHC RBC-ENTMCNC: 32.1 GM/DL — SIGNIFICANT CHANGE UP (ref 32–36)
MCHC RBC-ENTMCNC: 33.3 GM/DL — SIGNIFICANT CHANGE UP (ref 32–36)
MCHC RBC-ENTMCNC: 33.3 GM/DL — SIGNIFICANT CHANGE UP (ref 32–36)
MCV RBC AUTO: 64.8 FL — LOW (ref 80–100)
MCV RBC AUTO: 64.8 FL — LOW (ref 80–100)
MCV RBC AUTO: 67 FL — LOW (ref 80–100)
MCV RBC AUTO: 67 FL — LOW (ref 80–100)
MONOCYTES # BLD AUTO: 0.69 K/UL — SIGNIFICANT CHANGE UP (ref 0–0.9)
MONOCYTES # BLD AUTO: 0.69 K/UL — SIGNIFICANT CHANGE UP (ref 0–0.9)
MONOCYTES # BLD AUTO: 0.99 K/UL — HIGH (ref 0–0.9)
MONOCYTES # BLD AUTO: 0.99 K/UL — HIGH (ref 0–0.9)
MONOCYTES NFR BLD AUTO: 5 % — SIGNIFICANT CHANGE UP (ref 2–14)
MONOCYTES NFR BLD AUTO: 5 % — SIGNIFICANT CHANGE UP (ref 2–14)
MONOCYTES NFR BLD AUTO: 6.7 % — SIGNIFICANT CHANGE UP (ref 2–14)
MONOCYTES NFR BLD AUTO: 6.7 % — SIGNIFICANT CHANGE UP (ref 2–14)
NEUTROPHILS # BLD AUTO: 11.33 K/UL — HIGH (ref 1.8–7.4)
NEUTROPHILS # BLD AUTO: 11.33 K/UL — HIGH (ref 1.8–7.4)
NEUTROPHILS # BLD AUTO: 11.96 K/UL — HIGH (ref 1.8–7.4)
NEUTROPHILS # BLD AUTO: 11.96 K/UL — HIGH (ref 1.8–7.4)
NEUTROPHILS NFR BLD AUTO: 80.7 % — HIGH (ref 43–77)
NEUTROPHILS NFR BLD AUTO: 80.7 % — HIGH (ref 43–77)
NEUTROPHILS NFR BLD AUTO: 82.4 % — HIGH (ref 43–77)
NEUTROPHILS NFR BLD AUTO: 82.4 % — HIGH (ref 43–77)
NITRITE UR-MCNC: NEGATIVE — SIGNIFICANT CHANGE UP
NITRITE UR-MCNC: NEGATIVE — SIGNIFICANT CHANGE UP
NRBC # BLD: 0 /100 WBCS — SIGNIFICANT CHANGE UP (ref 0–0)
NRBC # FLD: 0 K/UL — SIGNIFICANT CHANGE UP (ref 0–0)
PCO2 BLDV: 29 MMHG — LOW (ref 39–52)
PCO2 BLDV: 29 MMHG — LOW (ref 39–52)
PCO2 BLDV: 38 MMHG — LOW (ref 39–52)
PCO2 BLDV: 38 MMHG — LOW (ref 39–52)
PH BLDV: 7.24 — LOW (ref 7.32–7.43)
PH BLDV: 7.24 — LOW (ref 7.32–7.43)
PH BLDV: 7.33 — SIGNIFICANT CHANGE UP (ref 7.32–7.43)
PH BLDV: 7.33 — SIGNIFICANT CHANGE UP (ref 7.32–7.43)
PH UR: 5.5 — SIGNIFICANT CHANGE UP (ref 5–8)
PH UR: 5.5 — SIGNIFICANT CHANGE UP (ref 5–8)
PLATELET # BLD AUTO: 391 K/UL — SIGNIFICANT CHANGE UP (ref 150–400)
PLATELET # BLD AUTO: 391 K/UL — SIGNIFICANT CHANGE UP (ref 150–400)
PLATELET # BLD AUTO: 401 K/UL — HIGH (ref 150–400)
PLATELET # BLD AUTO: 401 K/UL — HIGH (ref 150–400)
PO2 BLDV: 37 MMHG — SIGNIFICANT CHANGE UP (ref 25–45)
PO2 BLDV: 37 MMHG — SIGNIFICANT CHANGE UP (ref 25–45)
PO2 BLDV: 70 MMHG — HIGH (ref 25–45)
PO2 BLDV: 70 MMHG — HIGH (ref 25–45)
POTASSIUM BLDV-SCNC: 4.2 MMOL/L — SIGNIFICANT CHANGE UP (ref 3.5–5.1)
POTASSIUM BLDV-SCNC: 4.2 MMOL/L — SIGNIFICANT CHANGE UP (ref 3.5–5.1)
POTASSIUM BLDV-SCNC: 4.6 MMOL/L — SIGNIFICANT CHANGE UP (ref 3.5–5.1)
POTASSIUM BLDV-SCNC: 4.6 MMOL/L — SIGNIFICANT CHANGE UP (ref 3.5–5.1)
POTASSIUM SERPL-MCNC: 4.1 MMOL/L — SIGNIFICANT CHANGE UP (ref 3.5–5.3)
POTASSIUM SERPL-MCNC: 4.1 MMOL/L — SIGNIFICANT CHANGE UP (ref 3.5–5.3)
POTASSIUM SERPL-MCNC: 4.3 MMOL/L — SIGNIFICANT CHANGE UP (ref 3.5–5.3)
POTASSIUM SERPL-MCNC: 4.3 MMOL/L — SIGNIFICANT CHANGE UP (ref 3.5–5.3)
POTASSIUM SERPL-MCNC: SIGNIFICANT CHANGE UP MMOL/L (ref 3.5–5.3)
POTASSIUM SERPL-MCNC: SIGNIFICANT CHANGE UP MMOL/L (ref 3.5–5.3)
POTASSIUM SERPL-SCNC: 4.1 MMOL/L — SIGNIFICANT CHANGE UP (ref 3.5–5.3)
POTASSIUM SERPL-SCNC: 4.1 MMOL/L — SIGNIFICANT CHANGE UP (ref 3.5–5.3)
POTASSIUM SERPL-SCNC: 4.3 MMOL/L — SIGNIFICANT CHANGE UP (ref 3.5–5.3)
POTASSIUM SERPL-SCNC: 4.3 MMOL/L — SIGNIFICANT CHANGE UP (ref 3.5–5.3)
POTASSIUM SERPL-SCNC: SIGNIFICANT CHANGE UP MMOL/L (ref 3.5–5.3)
POTASSIUM SERPL-SCNC: SIGNIFICANT CHANGE UP MMOL/L (ref 3.5–5.3)
PROT SERPL-MCNC: 7.8 G/DL — SIGNIFICANT CHANGE UP (ref 6–8.3)
PROT SERPL-MCNC: 7.8 G/DL — SIGNIFICANT CHANGE UP (ref 6–8.3)
PROT SERPL-MCNC: 7.9 G/DL — SIGNIFICANT CHANGE UP (ref 6–8.3)
PROT SERPL-MCNC: 7.9 G/DL — SIGNIFICANT CHANGE UP (ref 6–8.3)
PROT SERPL-MCNC: 8.7 G/DL — HIGH (ref 6–8.3)
PROT SERPL-MCNC: 8.7 G/DL — HIGH (ref 6–8.3)
PROT UR-MCNC: 30 MG/DL
PROT UR-MCNC: 30 MG/DL
PROTHROM AB SERPL-ACNC: 13.3 SEC — HIGH (ref 9.5–13)
PROTHROM AB SERPL-ACNC: 13.3 SEC — HIGH (ref 9.5–13)
RBC # BLD: 3.66 M/UL — LOW (ref 3.8–5.2)
RBC # BLD: 3.66 M/UL — LOW (ref 3.8–5.2)
RBC # BLD: 3.76 M/UL — LOW (ref 3.8–5.2)
RBC # BLD: 3.76 M/UL — LOW (ref 3.8–5.2)
RBC # FLD: 19.9 % — HIGH (ref 10.3–14.5)
RBC # FLD: 19.9 % — HIGH (ref 10.3–14.5)
RBC # FLD: 20.6 % — HIGH (ref 10.3–14.5)
RBC # FLD: 20.6 % — HIGH (ref 10.3–14.5)
RBC CASTS # UR COMP ASSIST: 51 /HPF — HIGH (ref 0–4)
RBC CASTS # UR COMP ASSIST: 51 /HPF — HIGH (ref 0–4)
REVIEW: SIGNIFICANT CHANGE UP
REVIEW: SIGNIFICANT CHANGE UP
SAO2 % BLDV: 46.8 % — LOW (ref 67–88)
SAO2 % BLDV: 46.8 % — LOW (ref 67–88)
SAO2 % BLDV: 93.9 % — HIGH (ref 67–88)
SAO2 % BLDV: 93.9 % — HIGH (ref 67–88)
SODIUM SERPL-SCNC: 126 MMOL/L — LOW (ref 135–145)
SODIUM SERPL-SCNC: 126 MMOL/L — LOW (ref 135–145)
SODIUM SERPL-SCNC: 129 MMOL/L — LOW (ref 135–145)
SODIUM SERPL-SCNC: 129 MMOL/L — LOW (ref 135–145)
SODIUM SERPL-SCNC: 131 MMOL/L — LOW (ref 135–145)
SODIUM SERPL-SCNC: 131 MMOL/L — LOW (ref 135–145)
SP GR SPEC: 1.01 — SIGNIFICANT CHANGE UP (ref 1–1.03)
SP GR SPEC: 1.01 — SIGNIFICANT CHANGE UP (ref 1–1.03)
SQUAMOUS # UR AUTO: 3 /HPF — SIGNIFICANT CHANGE UP (ref 0–5)
SQUAMOUS # UR AUTO: 3 /HPF — SIGNIFICANT CHANGE UP (ref 0–5)
UROBILINOGEN FLD QL: 0.2 MG/DL — SIGNIFICANT CHANGE UP (ref 0.2–1)
UROBILINOGEN FLD QL: 0.2 MG/DL — SIGNIFICANT CHANGE UP (ref 0.2–1)
WBC # BLD: 13.75 K/UL — HIGH (ref 3.8–10.5)
WBC # BLD: 13.75 K/UL — HIGH (ref 3.8–10.5)
WBC # BLD: 14.81 K/UL — HIGH (ref 3.8–10.5)
WBC # BLD: 14.81 K/UL — HIGH (ref 3.8–10.5)
WBC # FLD AUTO: 13.75 K/UL — HIGH (ref 3.8–10.5)
WBC # FLD AUTO: 13.75 K/UL — HIGH (ref 3.8–10.5)
WBC # FLD AUTO: 14.81 K/UL — HIGH (ref 3.8–10.5)
WBC # FLD AUTO: 14.81 K/UL — HIGH (ref 3.8–10.5)
WBC UR QL: 84 /HPF — HIGH (ref 0–5)
WBC UR QL: 84 /HPF — HIGH (ref 0–5)

## 2023-12-02 PROCEDURE — 73630 X-RAY EXAM OF FOOT: CPT | Mod: 26,50

## 2023-12-02 PROCEDURE — 71045 X-RAY EXAM CHEST 1 VIEW: CPT | Mod: 26

## 2023-12-02 PROCEDURE — 99223 1ST HOSP IP/OBS HIGH 75: CPT

## 2023-12-02 PROCEDURE — 99285 EMERGENCY DEPT VISIT HI MDM: CPT

## 2023-12-02 PROCEDURE — 93010 ELECTROCARDIOGRAM REPORT: CPT

## 2023-12-02 RX ORDER — VANCOMYCIN HCL 1 G
1000 VIAL (EA) INTRAVENOUS ONCE
Refills: 0 | Status: COMPLETED | OUTPATIENT
Start: 2023-12-02 | End: 2023-12-02

## 2023-12-02 RX ORDER — SODIUM BICARBONATE 1 MEQ/ML
0.12 SYRINGE (ML) INTRAVENOUS
Qty: 150 | Refills: 0 | Status: DISCONTINUED | OUTPATIENT
Start: 2023-12-02 | End: 2023-12-03

## 2023-12-02 RX ORDER — DEXTROSE 50 % IN WATER 50 %
12.5 SYRINGE (ML) INTRAVENOUS ONCE
Refills: 0 | Status: DISCONTINUED | OUTPATIENT
Start: 2023-12-02 | End: 2023-12-18

## 2023-12-02 RX ORDER — ONDANSETRON 8 MG/1
4 TABLET, FILM COATED ORAL EVERY 8 HOURS
Refills: 0 | Status: DISCONTINUED | OUTPATIENT
Start: 2023-12-02 | End: 2023-12-03

## 2023-12-02 RX ORDER — CEFEPIME 1 G/1
1000 INJECTION, POWDER, FOR SOLUTION INTRAMUSCULAR; INTRAVENOUS EVERY 24 HOURS
Refills: 0 | Status: DISCONTINUED | OUTPATIENT
Start: 2023-12-03 | End: 2023-12-07

## 2023-12-02 RX ORDER — DEXTROSE 50 % IN WATER 50 %
25 SYRINGE (ML) INTRAVENOUS ONCE
Refills: 0 | Status: DISCONTINUED | OUTPATIENT
Start: 2023-12-02 | End: 2023-12-18

## 2023-12-02 RX ORDER — HEPARIN SODIUM 5000 [USP'U]/ML
5000 INJECTION INTRAVENOUS; SUBCUTANEOUS EVERY 8 HOURS
Refills: 0 | Status: DISCONTINUED | OUTPATIENT
Start: 2023-12-02 | End: 2023-12-18

## 2023-12-02 RX ORDER — SODIUM CHLORIDE 9 MG/ML
1000 INJECTION INTRAMUSCULAR; INTRAVENOUS; SUBCUTANEOUS ONCE
Refills: 0 | Status: COMPLETED | OUTPATIENT
Start: 2023-12-02 | End: 2023-12-02

## 2023-12-02 RX ORDER — ACETAMINOPHEN 500 MG
1000 TABLET ORAL ONCE
Refills: 0 | Status: COMPLETED | OUTPATIENT
Start: 2023-12-02 | End: 2023-12-02

## 2023-12-02 RX ORDER — SITAGLIPTIN 50 MG/1
1 TABLET, FILM COATED ORAL
Refills: 0 | DISCHARGE

## 2023-12-02 RX ORDER — SODIUM CHLORIDE 9 MG/ML
1000 INJECTION, SOLUTION INTRAVENOUS
Refills: 0 | Status: DISCONTINUED | OUTPATIENT
Start: 2023-12-02 | End: 2023-12-03

## 2023-12-02 RX ORDER — ATORVASTATIN CALCIUM 80 MG/1
40 TABLET, FILM COATED ORAL AT BEDTIME
Refills: 0 | Status: DISCONTINUED | OUTPATIENT
Start: 2023-12-02 | End: 2023-12-18

## 2023-12-02 RX ORDER — TETANUS TOXOID, REDUCED DIPHTHERIA TOXOID AND ACELLULAR PERTUSSIS VACCINE, ADSORBED 5; 2.5; 8; 8; 2.5 [IU]/.5ML; [IU]/.5ML; UG/.5ML; UG/.5ML; UG/.5ML
0.5 SUSPENSION INTRAMUSCULAR ONCE
Refills: 0 | Status: COMPLETED | OUTPATIENT
Start: 2023-12-02 | End: 2023-12-02

## 2023-12-02 RX ORDER — CEFEPIME 1 G/1
INJECTION, POWDER, FOR SOLUTION INTRAMUSCULAR; INTRAVENOUS
Refills: 0 | Status: DISCONTINUED | OUTPATIENT
Start: 2023-12-02 | End: 2023-12-07

## 2023-12-02 RX ORDER — LANOLIN ALCOHOL/MO/W.PET/CERES
3 CREAM (GRAM) TOPICAL AT BEDTIME
Refills: 0 | Status: DISCONTINUED | OUTPATIENT
Start: 2023-12-02 | End: 2023-12-18

## 2023-12-02 RX ORDER — CHOLECALCIFEROL (VITAMIN D3) 125 MCG
1 CAPSULE ORAL
Qty: 0 | Refills: 0 | DISCHARGE

## 2023-12-02 RX ORDER — ACETAMINOPHEN 500 MG
650 TABLET ORAL EVERY 6 HOURS
Refills: 0 | Status: DISCONTINUED | OUTPATIENT
Start: 2023-12-02 | End: 2023-12-18

## 2023-12-02 RX ORDER — GLUCAGON INJECTION, SOLUTION 0.5 MG/.1ML
1 INJECTION, SOLUTION SUBCUTANEOUS ONCE
Refills: 0 | Status: DISCONTINUED | OUTPATIENT
Start: 2023-12-02 | End: 2023-12-18

## 2023-12-02 RX ORDER — CHOLECALCIFEROL (VITAMIN D3) 125 MCG
2000 CAPSULE ORAL DAILY
Refills: 0 | Status: DISCONTINUED | OUTPATIENT
Start: 2023-12-02 | End: 2023-12-18

## 2023-12-02 RX ORDER — DEXTROSE 50 % IN WATER 50 %
15 SYRINGE (ML) INTRAVENOUS ONCE
Refills: 0 | Status: DISCONTINUED | OUTPATIENT
Start: 2023-12-02 | End: 2023-12-18

## 2023-12-02 RX ORDER — INFLUENZA VIRUS VACCINE 15; 15; 15; 15 UG/.5ML; UG/.5ML; UG/.5ML; UG/.5ML
0.5 SUSPENSION INTRAMUSCULAR ONCE
Refills: 0 | Status: DISCONTINUED | OUTPATIENT
Start: 2023-12-02 | End: 2023-12-18

## 2023-12-02 RX ORDER — CEFEPIME 1 G/1
1000 INJECTION, POWDER, FOR SOLUTION INTRAMUSCULAR; INTRAVENOUS ONCE
Refills: 0 | Status: COMPLETED | OUTPATIENT
Start: 2023-12-02 | End: 2023-12-02

## 2023-12-02 RX ORDER — ATORVASTATIN CALCIUM 80 MG/1
1 TABLET, FILM COATED ORAL
Refills: 0 | DISCHARGE

## 2023-12-02 RX ADMIN — SODIUM CHLORIDE 1000 MILLILITER(S): 9 INJECTION INTRAMUSCULAR; INTRAVENOUS; SUBCUTANEOUS at 06:41

## 2023-12-02 RX ADMIN — Medication 400 MILLIGRAM(S): at 05:41
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
## 2023-12-02 NOTE — H&P ADULT - NSHPPHYSICALEXAM_GEN_ALL_CORE
GENERAL APPEARANCE: Well developed, drowsy but easily arousable, NAD.   HEENT:  PERRL, EOMI. External auditory canals normal, hearing grossly intact.  NECK: Neck supple, non-tender   CARDIAC: Normal S1 and S2. No S3, S4 or murmurs. Rhythm is regular.  LUNGS: Clear to auscultation and percussion without rales, rhonchi, wheezing or diminished breath sounds.  ABDOMEN: Positive bowel sounds. Soft, nondistended, nontender. No guarding or rebound. .   EXTREMITIES: severe weeping lymphedema.  Peripheral pulses intact.   NEUROLOGICAL: CN II-XII intact. No gross deficits on exam   SKIN: severe b/l lymphedema w/ hyperkeratosis, foul smelling drainage. Left foot wrapped   PSYCHIATRIC: AOx3.Normal affect and behavior.

## 2023-12-02 NOTE — H&P ADULT - NSHPREVIEWOFSYSTEMS_GEN_ALL_CORE
Limited 2/2 pt cooperation     REVIEW OF SYSTEMS:    CONSTITUTIONAL: +weakness No fevers or chills  EYES/ENT: No visual changes;  No vertigo or throat pain   NECK: No pain or stiffness  RESPIRATORY: No cough, wheezing, hemoptysis; No shortness of breath  CARDIOVASCULAR: No chest pain or palpitations  GASTROINTESTINAL: +anorexia No abdominal or epigastric pain. No nausea, vomiting, or hematemesis; No diarrhea or constipation. No melena or hematochezia.  GENITOURINARY: +dysuria No hematuria  NEUROLOGICAL: No numbness or focal weakness  SKIN: LE lymphedema w/ malodorous discharge   All other review of systems is negative unless indicated above.

## 2023-12-02 NOTE — H&P ADULT - PROBLEM SELECTOR PLAN 3
-pt with severe lymphedema w/ hyperkeratosis. Difficult to determine if acute infection but given increase in malodorous discharge as well as worsening pain, WBC- favor treating empirically with vanc and cefepime   -appreciate pod recs  -f/u MRI of left foot to r/o OM  -wound care consult for further recs

## 2023-12-02 NOTE — ED PROCEDURE NOTE - PROCEDURE ADDITIONAL DETAILS
Peripheral IV obtained under dynamic ultrasound guidance with dark nonpulsatile blood return.  Catheter confirmed in compressible vein post insertion.  No extravasation afterwards.

## 2023-12-02 NOTE — ED ADULT NURSE NOTE - NSFALLRISKINTERV_ED_ALL_ED
Assistance OOB with selected safe patient handling equipment if applicable/Assistance with ambulation/Communicate fall risk and risk factors to all staff, patient, and family/Monitor gait and stability/Provide visual cue: yellow wristband, yellow gown, etc/Reinforce activity limits and safety measures with patient and family/Call bell, personal items and telephone in reach/Instruct patient to call for assistance before getting out of bed/chair/stretcher/Non-slip footwear applied when patient is off stretcher/[place] to call system/Physically safe environment - no spills, clutter or unnecessary equipment/Purposeful Proactive Rounding/Room/bathroom lighting operational, light cord in reach

## 2023-12-02 NOTE — H&P ADULT - PROBLEM SELECTOR PLAN 6
-2/2 some hypovolemia?  -improved s/p IVF  -avoid correcting greater than 4-6 meq in 24 hours  -f/u urine lytes

## 2023-12-02 NOTE — ED ADULT NURSE NOTE - OBJECTIVE STATEMENT
Patient received in room 19. A&O3. Ambulatory. Past medical history of HLD, HTN, DM, chronic bilateral lymphedema. Came into ED by EMS after trying to get into a hotel. Patient appears tearful with malodorous bilateral lower extremity lymphedema weeping. Patients legs appear to be blistered. Patient states having dysuria and urinary frequency for 1 month with confirmed UTI. Denies SOB, chest pain, N/V/D, fevers. Respirations even and unlabored. Comfort and safety maintained. Patient refusing MD erasmo santos made aware.

## 2023-12-02 NOTE — ED PROVIDER NOTE - CLINICAL SUMMARY MEDICAL DECISION MAKING FREE TEXT BOX
63-year-old female with past medical history of DM, HTN, HLD, chronic bilateral lower extremity lymphedema presents today by EMS for lower extremity edema with malodorous discharge.  Patient states that lower extremity has been getting progressively worse over the past month since last admission.  Endorsing associated symptoms of headaches today.  Patient notes that she has been having symptoms of dysuria, increased urinary frequency for 1 month with confirmed UTI requiring antibiotics.  States that she has not taken her antibiotics because she "lost her medication".  Denies fever, chills, nausea, vomiting, chest pain, SOB, changes in bowel movement.  No history of heart failure or kidney disease.      Given history of increased bilateral lower extremity lymphedema with malodorous increased discharge without proper wound care with ulcerations and purulent discharge along the right lower extremity will order CBC, CMP, VBG to rule out infectious etiology versus cellulitis.  Ordered UA with cultures to rule out UTI for complaints of dysuria and urinary frequency without suprapubic pain.  Given Ofirmev will reassess

## 2023-12-02 NOTE — ED ADULT NURSE REASSESSMENT NOTE - NS ED NURSE REASSESS COMMENT FT1
Complete linen change and repositioned with max assist of 3. partial bath given. Purewick placed and instructed on use, verbalizes understanding.

## 2023-12-02 NOTE — PATIENT PROFILE ADULT - FALL HARM RISK - HARM RISK INTERVENTIONS
Assistance with ambulation/Assistance OOB with selected safe patient handling equipment/Communicate Risk of Fall with Harm to all staff/Discuss with provider need for PT consult/Monitor gait and stability/Provide patient with walking aids - walker, cane, crutches/Reinforce activity limits and safety measures with patient and family/Tailored Fall Risk Interventions/Visual Cue: Yellow wristband and red socks/Bed in lowest position, wheels locked, appropriate side rails in place/Call bell, personal items and telephone in reach/Instruct patient to call for assistance before getting out of bed or chair/Non-slip footwear when patient is out of bed/[place] to call system/Physically safe environment - no spills, clutter or unnecessary equipment/Purposeful Proactive Rounding/Room/bathroom lighting operational, light cord in reach Assistance with ambulation/Assistance OOB with selected safe patient handling equipment/Communicate Risk of Fall with Harm to all staff/Discuss with provider need for PT consult/Monitor gait and stability/Provide patient with walking aids - walker, cane, crutches/Reinforce activity limits and safety measures with patient and family/Tailored Fall Risk Interventions/Visual Cue: Yellow wristband and red socks/Bed in lowest position, wheels locked, appropriate side rails in place/Call bell, personal items and telephone in reach/Instruct patient to call for assistance before getting out of bed or chair/Non-slip footwear when patient is out of bed/[place] to call system/Physically safe environment - no spills, clutter or unnecessary equipment/Purposeful Proactive Rounding/Room/bathroom lighting operational, light cord in reach Assistance with ambulation/Assistance OOB with selected safe patient handling equipment/Communicate Risk of Fall with Harm to all staff/Discuss with provider need for PT consult/Monitor gait and stability/Provide patient with walking aids - walker, cane, crutches/Reinforce activity limits and safety measures with patient and family/Tailored Fall Risk Interventions/Visual Cue: Yellow wristband and red socks/Bed in lowest position, wheels locked, appropriate side rails in place/Call bell, personal items and telephone in reach/Instruct patient to call for assistance before getting out of bed or chair/Non-slip footwear when patient is out of bed/[place] to call system/Physically safe environment - no spills, clutter or unnecessary equipment/Purposeful Proactive Rounding/Room/bathroom lighting operational, light cord in reach

## 2023-12-02 NOTE — CONSULT NOTE ADULT - ASSESSMENT
63F presents with chronic wheeping lymphodema and left foot bullae  - Pt seen and evaluated   - Afebrile, WBC 13.75  - b/l chronic wheeping lymphodema from digits to knee, left foot bullae with serous fluid of digits 1,2,3,4,5 extending to dorsal, medial, lateral foot extending to midfoot. no purulence, no acute signs of infection, no erythema, malodorous.  - b/l XR: possible OM, no gas.  - XR does not correlate clinically   - Leukocytosis unlikely from foot   -Rrecommend wound care consult for lymphoedema management   - No podiatric surgical intervention planned this admission   - Please Reconsult PRN  - Seen with attending

## 2023-12-02 NOTE — H&P ADULT - NSHPLABSRESULTS_GEN_ALL_CORE
( @ 07:41)                      7.9  13.75 )-----------( 401                 23.7    Neutrophils = 11.33 (82.4%)  Lymphocytes = 1.51 (11.0%)  Eosinophils = 0.05 (0.4%)  Basophils = 0.03 (0.2%)  Monocytes = 0.69 (5.0%)  Bands = --%        131<L>  |  98  |  87<H>  ----------------------------<  157<H>  4.3   |  15<L>  |  4.67<H>    Ca    8.0<L>      02 Dec 2023 07:41    TPro  7.9  /  Alb  2.8<L>  /  TBili  0.4  /  DBili  x   /  AST  21  /  ALT  33  /  AlkPhos  114      ( 02 Dec 2023 07:41 )   PT: 13.3 sec;   INR: 1.18 ratio;       PTT:27.3 sec      Venous Blood Gas:   @ 07:41  7.28/35/41/16/56.5  VBG Lactate: 1.9  Venous Blood Gas:   @ 06:55  7.33/29/70/15/93.9  VBG Lactate: 1.1  Venous Blood Gas:   @ 05:37  7.24/38/37/16/46.8  VBG Lactate: 2.2      Urinalysis Basic - ( 02 Dec 2023 13:36 )    Color: Yellow / Appearance: Cloudy / S.013 / pH: x  Gluc: x / Ketone: Negative mg/dL  / Bili: Negative / Urobili: 0.2 mg/dL   Blood: x / Protein: 30 mg/dL / Nitrite: Negative   Leuk Esterase: Large / RBC: 51 /HPF / WBC 84 /HPF   Sq Epi: x / Non Sq Epi: 3 /HPF / Bacteria: Many /HPF      < from: Xray Foot AP + Lateral + Oblique, Bilat (23 @ 06:07) >    IMPRESSION:  Cortical lucency along the medial aspect of the left fifth metatarsal   head may represent erosion. Correlate clinically and can obtain MR foot   with IV contrast for further evaluation.    Extensive soft tissue edema bilaterally.    < end of copied text >      Labs personally reviewed  Imaging reviewed

## 2023-12-02 NOTE — ED PROVIDER NOTE - PROGRESS NOTE DETAILS
Woodrow Booth DO (PGY1)  Called Hospitalist for admission. Would like podiatry consulted to assess if surgical intervention is necessary a this time for possible 3rd degree frost bite. Will appreciate recommendation and contact hospitalist for admission if pt doesn't need surgical intervention. KOFFI PEARSON: Patient signed out to me to f/u repeat labs, podiatry consult, plan to admit. Per RN, patient is less responsive than before w/ low BP of 91/40. . Pt evaluated at bedside with Dr. Vidal and Dr. Leon. Pt placed on Bipap. USIV performed at bedside for additional access. Pt became more awake, wants to take mask off, unable to tolerate Bipap machine. Pt likely has LANA. Pt speaking in full sentences, having conversation w/ RNs at bedside. Pending Podiatry recommendation before admission. Will continue to monitor and reassess. KOFFI PEARSON: Spoke with podiatry, states will evaluate patient w/ attending. Will continue to monitor and reassess. KOFFI PEARSON: pt seen by podiatry team, recommend wound consult, no acute surgical intervention. Per attending, spoke with hospitalist, accepted pt for admission.

## 2023-12-02 NOTE — H&P ADULT - PROBLEM SELECTOR PLAN 1
-pt p/w leukocytosis, tachy with multiple potential sources of infection -skin/soft tissue, OM, UTI  -treat empirically with vanc (by level) and cefepime   -f/u MRI of left foot to r/o OM of 5th digit   f/u blood and urine cultures  -f/u MRSA swab

## 2023-12-02 NOTE — ED PROVIDER NOTE - CARE PLAN
1 Principal Discharge DX:	Acute UTI  Secondary Diagnosis:	Lymphedema of extremity   Principal Discharge DX:	HELLEN (acute kidney injury)  Secondary Diagnosis:	Lymphedema of extremity

## 2023-12-02 NOTE — ED ADULT NURSE REASSESSMENT NOTE - NS ED NURSE REASSESS COMMENT FT1
Care assumed. Lethargic, arouses easily to voice. O x 3 (not to month "I don't know what month it is!") Denies pain. Awaiting podiatry c/s

## 2023-12-02 NOTE — ED ADULT NURSE REASSESSMENT NOTE - NS ED NURSE REASSESS COMMENT FT1
MD moraes and KOFFI wright made aware of patient BP, fluids running. Patient refusing Bipap machine. Report to be given to oncoming LOUANN franco. MD moraes and KOFFI wright made aware of patient BP, fluids running. Patient refusing Bipap machine providers aware. Report to be given to oncoming LOUANN franco. Pending LabR.

## 2023-12-02 NOTE — ED ADULT NURSE REASSESSMENT NOTE - NS ED NURSE REASSESS COMMENT FT1
Alert and yelling out "I need something to eat, they said I could eat!" Discussed with ED MD-ok to eat given BF

## 2023-12-02 NOTE — CONSULT NOTE ADULT - SUBJECTIVE AND OBJECTIVE BOX
Patient is a 63y old  Female who presents with a chief complaint of     HPI:      PAST MEDICAL & SURGICAL HISTORY:  Chronic Acquired Lymphedema      Adult-Onset Obesity      Iron Deficiency Anemia      DM (diabetes mellitus)      HTN (hypertension)          MEDICATIONS  (STANDING):    MEDICATIONS  (PRN):      Allergies    No Known Allergies    Intolerances        VITALS:    Vital Signs Last 24 Hrs  T(C): 36 (02 Dec 2023 08:57), Max: 37.1 (02 Dec 2023 07:35)  T(F): 96.8 (02 Dec 2023 08:57), Max: 98.8 (02 Dec 2023 07:35)  HR: 65 (02 Dec 2023 08:57) (63 - 104)  BP: 93/55 (02 Dec 2023 08:57) (91/40 - 114/57)  BP(mean): 65 (02 Dec 2023 08:57) (65 - 65)  RR: 22 (02 Dec 2023 08:57) (18 - 22)  SpO2: 99% (02 Dec 2023 08:57) (98% - 100%)    Parameters below as of 02 Dec 2023 08:59  Patient On (Oxygen Delivery Method): room air        LABS:                          7.9    13.75 )-----------( 401      ( 02 Dec 2023 07:41 )             23.7       12-02    131<L>  |  98  |  87<H>  ----------------------------<  157<H>  4.3   |  15<L>  |  4.67<H>    Ca    8.0<L>      02 Dec 2023 07:41    TPro  7.9  /  Alb  2.8<L>  /  TBili  0.4  /  DBili  x   /  AST  21  /  ALT  33  /  AlkPhos  114  12-02      CAPILLARY BLOOD GLUCOSE      POCT Blood Glucose.: 184 mg/dL (02 Dec 2023 07:21)  POCT Blood Glucose.: 175 mg/dL (02 Dec 2023 02:14)      PT/INR - ( 02 Dec 2023 07:41 )   PT: 13.3 sec;   INR: 1.18 ratio         PTT - ( 02 Dec 2023 07:41 )  PTT:27.3 sec    LOWER EXTREMITY PHYSICAL EXAM:    Vascular: DP/PT 0/4, B/L, CFT <3 seconds B/L, Temperature gradient warm to warm, B/L.   Neuro: unable to be assessed 2/2 mental status  Musculoskeletal/Ortho: pain on palpation of left foot  Skin: b/l chronic wheeping lymphodema from digits to knee, left foot bullae with serous fluid of digits 1,2,3,4,5 extending to dorsal, medial, lateral foot extending to midfoot. no purulence, no acute signs of infection, no erythema, malodorous.      RADIOLOGY & ADDITIONAL STUDIES:  < from: Xray Foot AP + Lateral + Oblique, Bilat (12.02.23 @ 06:07) >    ACC: [id number] EXAM:  XR FOOT COMP MIN 3 VIEWS BI   ORDERED BY: CHINA SEGOVIA     PROCEDURE DATE:  12/02/2023          INTERPRETATION:  CLINICAL INDICATION: Lower extremity edema with discharge    TECHNIQUE: X-ray bilateral feet: 3 views of left and 2 views of right    COMPARISON: None    FINDINGS:    Left:  No acute fracture. Cortical lucency along the medial aspect of the fifth   metatarsal head may represent erosion.  No dislocation. Joint space narrowing of the the DIP and PIP joints.  Achilles enthesophyte.  Extensive diffuse soft tissue edema. No subcutaneous tracking of gas.    Right foot:  No acute fracture. No cortical erosions.  No dislocation. Joint space narrowing of the DIP and PIP joints.  Achilles enthesophyte.  Extensive diffuse soft tissue edema. No subcutaneous tracking of gas.    IMPRESSION:  Cortical lucency along the medial aspect of the left fifth metatarsal   head may represent erosion. Correlate clinically and can obtain MR foot   with IV contrast for further evaluation.    Extensive soft tissue edema bilaterally.    --- End of Report ---          SUMIT COLON MD; Resident Radiologist  This document has been electronically signed.  LUBNA URIARTE MD; Attending Interventional Radiologist  This documenthas been electronically signed. Dec  2 2023 10:11AM    < end of copied text >     Patient is a 63y old  Female who presents with a chief complaint of     HPI:      PAST MEDICAL & SURGICAL HISTORY:  Chronic Acquired Lymphedema      Adult-Onset Obesity      Iron Deficiency Anemia      DM (diabetes mellitus)      HTN (hypertension)          MEDICATIONS  (STANDING):    MEDICATIONS  (PRN):      Allergies    No Known Allergies    Intolerances        VITALS:    Vital Signs Last 24 Hrs  T(C): 36 (02 Dec 2023 08:57), Max: 37.1 (02 Dec 2023 07:35)  T(F): 96.8 (02 Dec 2023 08:57), Max: 98.8 (02 Dec 2023 07:35)  HR: 65 (02 Dec 2023 08:57) (63 - 104)  BP: 93/55 (02 Dec 2023 08:57) (91/40 - 114/57)  BP(mean): 65 (02 Dec 2023 08:57) (65 - 65)  RR: 22 (02 Dec 2023 08:57) (18 - 22)  SpO2: 99% (02 Dec 2023 08:57) (98% - 100%)    Parameters below as of 02 Dec 2023 08:59  Patient On (Oxygen Delivery Method): room air        LABS:                          7.9    13.75 )-----------( 401      ( 02 Dec 2023 07:41 )             23.7       12-02    131<L>  |  98  |  87<H>  ----------------------------<  157<H>  4.3   |  15<L>  |  4.67<H>    Ca    8.0<L>      02 Dec 2023 07:41    TPro  7.9  /  Alb  2.8<L>  /  TBili  0.4  /  DBili  x   /  AST  21  /  ALT  33  /  AlkPhos  114  12-02      CAPILLARY BLOOD GLUCOSE      POCT Blood Glucose.: 184 mg/dL (02 Dec 2023 07:21)  POCT Blood Glucose.: 175 mg/dL (02 Dec 2023 02:14)      PT/INR - ( 02 Dec 2023 07:41 )   PT: 13.3 sec;   INR: 1.18 ratio         PTT - ( 02 Dec 2023 07:41 )  PTT:27.3 sec    LOWER EXTREMITY PHYSICAL EXAM:    Vascular: DP/PT 0/4, B/L, CFT <3 seconds B/L, Temperature gradient warm to warm, B/L.   Neuro: unable to be assessed 2/2 mental status  Musculoskeletal/Ortho: pain on palpation of left foot  Skin: b/l chronic wheeping lymphodema from digits to knee, left foot bullae with serous fluid of digits 1,2,3,4,5 extending to dorsal, medial, lateral foot extending to midfoot. no purulence, no acute signs of infection, no erythema, malodorous.      RADIOLOGY & ADDITIONAL STUDIES:  < from: Xray Foot AP + Lateral + Oblique, Bilat (12.02.23 @ 06:07) >    ACC: [id number] EXAM:  XR FOOT COMP MIN 3 VIEWS BI   ORDERED BY: CHINA SEGOVIA     PROCEDURE DATE:  12/02/2023          INTERPRETATION:  CLINICAL INDICATION: Lower extremity edema with discharge    TECHNIQUE: X-ray bilateral feet: 3 views of left and 2 views of right    COMPARISON: None    FINDINGS:    Left:  No acute fracture. Cortical lucency along the medial aspect of the fifth   metatarsal head may represent erosion.  No dislocation. Joint space narrowing of the the DIP and PIP joints.  Achilles enthesophyte.  Extensive diffuse soft tissue edema. No subcutaneous tracking of gas.    Right foot:  No acute fracture. No cortical erosions.  No dislocation. Joint space narrowing of the DIP and PIP joints.  Achilles enthesophyte.  Extensive diffuse soft tissue edema. No subcutaneous tracking of gas.    IMPRESSION:  Cortical lucency along the medial aspect of the left fifth metatarsal   head may represent erosion. Correlate clinically and can obtain MR foot   with IV contrast for further evaluation.    Extensive soft tissue edema bilaterally.    --- End of Report ---          SUMIT COLON MD; Resident Radiologist  This document has been electronically signed.  LUBNA URIARTE MD; Attending Interventional Radiologist  This documenthas been electronically signed. Dec  2 2023 10:11AM    < end of copied text >     Patient is a 63y old  Female who presents with a chief complaint of     HPI:      PAST MEDICAL & SURGICAL HISTORY:  Chronic Acquired Lymphedema      Adult-Onset Obesity      Iron Deficiency Anemia      DM (diabetes mellitus)      HTN (hypertension)          MEDICATIONS  (STANDING):    MEDICATIONS  (PRN):      Allergies    No Known Allergies    Intolerances        VITALS:    Vital Signs Last 24 Hrs  T(C): 36 (02 Dec 2023 08:57), Max: 37.1 (02 Dec 2023 07:35)  T(F): 96.8 (02 Dec 2023 08:57), Max: 98.8 (02 Dec 2023 07:35)  HR: 65 (02 Dec 2023 08:57) (63 - 104)  BP: 93/55 (02 Dec 2023 08:57) (91/40 - 114/57)  BP(mean): 65 (02 Dec 2023 08:57) (65 - 65)  RR: 22 (02 Dec 2023 08:57) (18 - 22)  SpO2: 99% (02 Dec 2023 08:57) (98% - 100%)    Parameters below as of 02 Dec 2023 08:59  Patient On (Oxygen Delivery Method): room air        LABS:                          7.9    13.75 )-----------( 401      ( 02 Dec 2023 07:41 )             23.7       12-02    131<L>  |  98  |  87<H>  ----------------------------<  157<H>  4.3   |  15<L>  |  4.67<H>    Ca    8.0<L>      02 Dec 2023 07:41    TPro  7.9  /  Alb  2.8<L>  /  TBili  0.4  /  DBili  x   /  AST  21  /  ALT  33  /  AlkPhos  114  12-02      CAPILLARY BLOOD GLUCOSE      POCT Blood Glucose.: 184 mg/dL (02 Dec 2023 07:21)  POCT Blood Glucose.: 175 mg/dL (02 Dec 2023 02:14)      PT/INR - ( 02 Dec 2023 07:41 )   PT: 13.3 sec;   INR: 1.18 ratio         PTT - ( 02 Dec 2023 07:41 )  PTT:27.3 sec    LOWER EXTREMITY PHYSICAL EXAM:    Vascular: DP/PT 0/4, B/L, CFT <3 seconds B/L, Temperature gradient warm to warm, B/L.   Neuro: unable to be assessed 2/2 mental status  Musculoskeletal/Ortho: pain on palpation of left foot  Skin: b/l chronic wheeping lymphodema from digits to knee, left foot bullae with serous fluid of digits 1,2,3,4,5 extending to dorsal, medial, lateral foot extending to midfoot. no purulence, no acute signs of infection, no erythema, malodorous.      RADIOLOGY & ADDITIONAL STUDIES:  < from: Xray Foot AP + Lateral + Oblique, Bilat (12.02.23 @ 06:07) >    ACC: [id number] EXAM:  XR FOOT COMP MIN 3 VIEWS BI   ORDERED BY: CHINA SEGOVIA     PROCEDURE DATE:  12/02/2023          INTERPRETATION:  CLINICAL INDICATION: Lower extremity edema with discharge    TECHNIQUE: X-ray bilateral feet: 3 views of left and 2 views of right    COMPARISON: None    FINDINGS:    Left:  No acute fracture. Cortical lucency along the medial aspect of the fifth   metatarsal head may represent erosion.  No dislocation. Joint space narrowing of the the DIP and PIP joints.  Achilles enthesophyte.  Extensive diffuse soft tissue edema. No subcutaneous tracking of gas.    Right foot:  No acute fracture. No cortical erosions.  No dislocation. Joint space narrowing of the DIP and PIP joints.  Achilles enthesophyte.  Extensive diffuse soft tissue edema. No subcutaneous tracking of gas.    IMPRESSION:  Cortical lucency along the medial aspect of the left fifth metatarsal   head may represent erosion. Correlate clinically and can obtain MR foot   with IV contrast for further evaluation.    Extensive soft tissue edema bilaterally.    --- End of Report ---          SUMIT COLON MD; Resident Radiologist  This document has been electronically signed.  LUBNA URIARTE MD; Attending Interventional Radiologist  This documenthas been electronically signed. Dec  2 2023 10:11AM    < end of copied text >

## 2023-12-02 NOTE — H&P ADULT - ASSESSMENT
63-year-old female with past medical history of DM, HTN, HLD, chronic bilateral lower extremity lymphedema presents today by EMS for lower extremity edema with malodorous discharge admitted with sepsis, r/o OM, LE cellulitis, UTI

## 2023-12-02 NOTE — H&P ADULT - PROBLEM SELECTOR PLAN 2
-last Cr ~3 weeks ago was 0.9  -w/ metabolic acidosis   -unclear cause, did not seem to improve much with IVF  -possibly atn in the setting of sepsis/hypotension, lasix use vs post obstructive process  -treat for uti as above  -start on sodium bicarb gtt for now, trend VBG  -f/u urine lytes  -f/u renal US  -trend BMP

## 2023-12-02 NOTE — PATIENT PROFILE ADULT - FALL HARM RISK - FALLEN IN PAST
Problem: Falls - Risk of  Goal: *Absence of Falls  Document Kapil Fall Risk and appropriate interventions in the flowsheet.    Fall Risk Interventions:  Mobility Interventions: Patient to call before getting OOB         Medication Interventions: Patient to call before getting OOB    Elimination Interventions: Call light in reach    History of Falls Interventions: Door open when patient unattended No

## 2023-12-02 NOTE — ED PROVIDER NOTE - OBJECTIVE STATEMENT
63-year-old female with past medical history of DM, HTN, HLD, chronic bilateral lower extremity lymphedema presents today by EMS for lower extremity edema with malodorous discharge.  Patient states that lower extremity has been getting progressively worse over the past month since last admission.  Endorsing associated symptoms of headaches today.  Patient notes that she has been having symptoms of dysuria, increased urinary frequency for 1 month with confirmed UTI requiring antibiotics.  States that she has not taken her antibiotics because she "lost her medication".  Denies fever, chills, nausea, vomiting, chest pain, SOB, changes in bowel movement.  No history of heart failure or kidney disease.

## 2023-12-02 NOTE — ED ADULT TRIAGE NOTE - CHIEF COMPLAINT QUOTE
b/l leg lymphedema and weeping    pt was attempting to enter a hotel but was refused.  ems called by hotel worker.  pt states she's undomiciled.  b/l lymphedema and weeping.  odor and drainage noted.  denies cp and sob.  past medical history htn, diabetes type 2, lymphedema

## 2023-12-02 NOTE — ED ADULT NURSE REASSESSMENT NOTE - NS ED NURSE REASSESS COMMENT FT1
Patient A&O1, difficult to arouse, arousable to verbal stimuli. MD zimmerman and team made aware and at bedside. USIV placed right ac by provider. Patient placed on bipap tolerating well.

## 2023-12-02 NOTE — H&P ADULT - PROBLEM SELECTOR PLAN 4
-episode of hypotension with AMS otherwise no resp distress or desaturation. Pt was started on bipap at the time for unclear reason   -ddx includes 2/2 sepsis, hypovolemia   -BP stable with no evidence of lactic acidosis or end organ damage   -c/w antibiotics  -trend VS q4h

## 2023-12-02 NOTE — ED PROVIDER NOTE - PHYSICAL EXAMINATION
Gen: NAD, non-toxic appearing  Head: normal appearing  HEENT: normal conjunctiva, oral mucosa moist  Lung: no respiratory distress, speaking in full sentences, CTA b/l     CV: regular rate and rhythm, no murmurs  Abd: soft, non distended, non tender   MSK: b/l lower extremity interstitial edema, with malodorous purulent discharge on left lower extremity. neurovascularly intact, normal sensation, 5/5 strength b/l   Neuro: No focal deficits, AAOx3  Skin: Warm  Psych: normal affect

## 2023-12-02 NOTE — ED ADULT NURSE REASSESSMENT NOTE - NS ED NURSE REASSESS COMMENT FT1
Patient A&O4 back to baseline mental status. Patient able to answer questions accordingly. Comfort and safety maintained. MD moraes and KOFFI wright made aware. pt resting in stretcher comfortably at this time, no new complaints offered. stretcher lowest position siderails up safety measures in place.

## 2023-12-03 DIAGNOSIS — N17.9 ACUTE KIDNEY FAILURE, UNSPECIFIED: ICD-10-CM

## 2023-12-03 DIAGNOSIS — A41.9 SEPSIS, UNSPECIFIED ORGANISM: ICD-10-CM

## 2023-12-03 DIAGNOSIS — E11.9 TYPE 2 DIABETES MELLITUS WITHOUT COMPLICATIONS: ICD-10-CM

## 2023-12-03 DIAGNOSIS — I95.9 HYPOTENSION, UNSPECIFIED: ICD-10-CM

## 2023-12-03 DIAGNOSIS — I10 ESSENTIAL (PRIMARY) HYPERTENSION: ICD-10-CM

## 2023-12-03 DIAGNOSIS — E87.1 HYPO-OSMOLALITY AND HYPONATREMIA: ICD-10-CM

## 2023-12-03 DIAGNOSIS — M79.604 PAIN IN RIGHT LEG: ICD-10-CM

## 2023-12-03 DIAGNOSIS — Z29.9 ENCOUNTER FOR PROPHYLACTIC MEASURES, UNSPECIFIED: ICD-10-CM

## 2023-12-03 DIAGNOSIS — N39.0 URINARY TRACT INFECTION, SITE NOT SPECIFIED: ICD-10-CM

## 2023-12-03 LAB
A1C WITH ESTIMATED AVERAGE GLUCOSE RESULT: 6.9 % — HIGH (ref 4–5.6)
A1C WITH ESTIMATED AVERAGE GLUCOSE RESULT: 6.9 % — HIGH (ref 4–5.6)
ANION GAP SERPL CALC-SCNC: 15 MMOL/L — HIGH (ref 7–14)
ANION GAP SERPL CALC-SCNC: 15 MMOL/L — HIGH (ref 7–14)
BASE EXCESS BLDV CALC-SCNC: -6.7 MMOL/L — LOW (ref -2–3)
BASE EXCESS BLDV CALC-SCNC: -6.7 MMOL/L — LOW (ref -2–3)
BLOOD GAS VENOUS COMPREHENSIVE RESULT: SIGNIFICANT CHANGE UP
BLOOD GAS VENOUS COMPREHENSIVE RESULT: SIGNIFICANT CHANGE UP
BUN SERPL-MCNC: 83 MG/DL — HIGH (ref 7–23)
BUN SERPL-MCNC: 83 MG/DL — HIGH (ref 7–23)
CALCIUM SERPL-MCNC: 7.7 MG/DL — LOW (ref 8.4–10.5)
CALCIUM SERPL-MCNC: 7.7 MG/DL — LOW (ref 8.4–10.5)
CHLORIDE BLDV-SCNC: 106 MMOL/L — SIGNIFICANT CHANGE UP (ref 96–108)
CHLORIDE BLDV-SCNC: 106 MMOL/L — SIGNIFICANT CHANGE UP (ref 96–108)
CHLORIDE SERPL-SCNC: 104 MMOL/L — SIGNIFICANT CHANGE UP (ref 98–107)
CHLORIDE SERPL-SCNC: 104 MMOL/L — SIGNIFICANT CHANGE UP (ref 98–107)
CHOLEST SERPL-MCNC: 103 MG/DL — SIGNIFICANT CHANGE UP
CHOLEST SERPL-MCNC: 103 MG/DL — SIGNIFICANT CHANGE UP
CO2 BLDV-SCNC: 19.2 MMOL/L — LOW (ref 22–26)
CO2 BLDV-SCNC: 19.2 MMOL/L — LOW (ref 22–26)
CO2 SERPL-SCNC: 17 MMOL/L — LOW (ref 22–31)
CO2 SERPL-SCNC: 17 MMOL/L — LOW (ref 22–31)
CREAT SERPL-MCNC: 3.72 MG/DL — HIGH (ref 0.5–1.3)
CREAT SERPL-MCNC: 3.72 MG/DL — HIGH (ref 0.5–1.3)
CRP SERPL-MCNC: 228.7 MG/L — HIGH
CRP SERPL-MCNC: 228.7 MG/L — HIGH
EGFR: 13 ML/MIN/1.73M2 — LOW
EGFR: 13 ML/MIN/1.73M2 — LOW
ERYTHROCYTE [SEDIMENTATION RATE] IN BLOOD: 109 MM/HR — HIGH (ref 4–25)
ERYTHROCYTE [SEDIMENTATION RATE] IN BLOOD: 109 MM/HR — HIGH (ref 4–25)
ESTIMATED AVERAGE GLUCOSE: 151 — SIGNIFICANT CHANGE UP
ESTIMATED AVERAGE GLUCOSE: 151 — SIGNIFICANT CHANGE UP
GAS PNL BLDV: 134 MMOL/L — LOW (ref 136–145)
GAS PNL BLDV: 134 MMOL/L — LOW (ref 136–145)
GLUCOSE BLDC GLUCOMTR-MCNC: 159 MG/DL — HIGH (ref 70–99)
GLUCOSE BLDC GLUCOMTR-MCNC: 159 MG/DL — HIGH (ref 70–99)
GLUCOSE BLDC GLUCOMTR-MCNC: 215 MG/DL — HIGH (ref 70–99)
GLUCOSE BLDC GLUCOMTR-MCNC: 215 MG/DL — HIGH (ref 70–99)
GLUCOSE BLDC GLUCOMTR-MCNC: 237 MG/DL — HIGH (ref 70–99)
GLUCOSE BLDC GLUCOMTR-MCNC: 237 MG/DL — HIGH (ref 70–99)
GLUCOSE BLDV-MCNC: 174 MG/DL — HIGH (ref 70–99)
GLUCOSE BLDV-MCNC: 174 MG/DL — HIGH (ref 70–99)
GLUCOSE SERPL-MCNC: 171 MG/DL — HIGH (ref 70–99)
GLUCOSE SERPL-MCNC: 171 MG/DL — HIGH (ref 70–99)
HCO3 BLDV-SCNC: 18 MMOL/L — LOW (ref 22–29)
HCO3 BLDV-SCNC: 18 MMOL/L — LOW (ref 22–29)
HCT VFR BLD CALC: 23.2 % — LOW (ref 34.5–45)
HCT VFR BLD CALC: 23.2 % — LOW (ref 34.5–45)
HCT VFR BLDA CALC: 21 % — CRITICAL LOW (ref 34.5–46.5)
HCT VFR BLDA CALC: 21 % — CRITICAL LOW (ref 34.5–46.5)
HDLC SERPL-MCNC: 21 MG/DL — LOW
HDLC SERPL-MCNC: 21 MG/DL — LOW
HGB BLD CALC-MCNC: 7.1 G/DL — LOW (ref 11.7–16.1)
HGB BLD CALC-MCNC: 7.1 G/DL — LOW (ref 11.7–16.1)
HGB BLD-MCNC: 7.8 G/DL — LOW (ref 11.5–15.5)
HGB BLD-MCNC: 7.8 G/DL — LOW (ref 11.5–15.5)
LACTATE BLDV-MCNC: 1.6 MMOL/L — SIGNIFICANT CHANGE UP (ref 0.5–2)
LACTATE BLDV-MCNC: 1.6 MMOL/L — SIGNIFICANT CHANGE UP (ref 0.5–2)
LIPID PNL WITH DIRECT LDL SERPL: 64 MG/DL — SIGNIFICANT CHANGE UP
LIPID PNL WITH DIRECT LDL SERPL: 64 MG/DL — SIGNIFICANT CHANGE UP
MAGNESIUM SERPL-MCNC: 2.3 MG/DL — SIGNIFICANT CHANGE UP (ref 1.6–2.6)
MAGNESIUM SERPL-MCNC: 2.3 MG/DL — SIGNIFICANT CHANGE UP (ref 1.6–2.6)
MCHC RBC-ENTMCNC: 21.4 PG — LOW (ref 27–34)
MCHC RBC-ENTMCNC: 21.4 PG — LOW (ref 27–34)
MCHC RBC-ENTMCNC: 33.6 GM/DL — SIGNIFICANT CHANGE UP (ref 32–36)
MCHC RBC-ENTMCNC: 33.6 GM/DL — SIGNIFICANT CHANGE UP (ref 32–36)
MCV RBC AUTO: 63.7 FL — LOW (ref 80–100)
MCV RBC AUTO: 63.7 FL — LOW (ref 80–100)
MRSA PCR RESULT.: DETECTED
MRSA PCR RESULT.: DETECTED
NON HDL CHOLESTEROL: 82 MG/DL — SIGNIFICANT CHANGE UP
NON HDL CHOLESTEROL: 82 MG/DL — SIGNIFICANT CHANGE UP
NRBC # BLD: 0 /100 WBCS — SIGNIFICANT CHANGE UP (ref 0–0)
NRBC # BLD: 0 /100 WBCS — SIGNIFICANT CHANGE UP (ref 0–0)
NRBC # FLD: 0 K/UL — SIGNIFICANT CHANGE UP (ref 0–0)
NRBC # FLD: 0 K/UL — SIGNIFICANT CHANGE UP (ref 0–0)
PCO2 BLDV: 33 MMHG — LOW (ref 39–52)
PCO2 BLDV: 33 MMHG — LOW (ref 39–52)
PH BLDV: 7.35 — SIGNIFICANT CHANGE UP (ref 7.32–7.43)
PH BLDV: 7.35 — SIGNIFICANT CHANGE UP (ref 7.32–7.43)
PHOSPHATE SERPL-MCNC: 5.1 MG/DL — HIGH (ref 2.5–4.5)
PHOSPHATE SERPL-MCNC: 5.1 MG/DL — HIGH (ref 2.5–4.5)
PLATELET # BLD AUTO: 400 K/UL — SIGNIFICANT CHANGE UP (ref 150–400)
PLATELET # BLD AUTO: 400 K/UL — SIGNIFICANT CHANGE UP (ref 150–400)
PO2 BLDV: 43 MMHG — SIGNIFICANT CHANGE UP (ref 25–45)
PO2 BLDV: 43 MMHG — SIGNIFICANT CHANGE UP (ref 25–45)
POTASSIUM BLDV-SCNC: 3.4 MMOL/L — LOW (ref 3.5–5.1)
POTASSIUM BLDV-SCNC: 3.4 MMOL/L — LOW (ref 3.5–5.1)
POTASSIUM SERPL-MCNC: 3.5 MMOL/L — SIGNIFICANT CHANGE UP (ref 3.5–5.3)
POTASSIUM SERPL-MCNC: 3.5 MMOL/L — SIGNIFICANT CHANGE UP (ref 3.5–5.3)
POTASSIUM SERPL-SCNC: 3.5 MMOL/L — SIGNIFICANT CHANGE UP (ref 3.5–5.3)
POTASSIUM SERPL-SCNC: 3.5 MMOL/L — SIGNIFICANT CHANGE UP (ref 3.5–5.3)
PROCALCITONIN SERPL-MCNC: 0.7 NG/ML — HIGH (ref 0.02–0.1)
PROCALCITONIN SERPL-MCNC: 0.7 NG/ML — HIGH (ref 0.02–0.1)
PROT ?TM UR-MCNC: 40 MG/DL — SIGNIFICANT CHANGE UP
PROT ?TM UR-MCNC: 40 MG/DL — SIGNIFICANT CHANGE UP
PROT SERPL-MCNC: 7.7 G/DL — SIGNIFICANT CHANGE UP (ref 6–8.3)
PROT SERPL-MCNC: 7.7 G/DL — SIGNIFICANT CHANGE UP (ref 6–8.3)
RBC # BLD: 3.64 M/UL — LOW (ref 3.8–5.2)
RBC # BLD: 3.64 M/UL — LOW (ref 3.8–5.2)
RBC # FLD: 19.3 % — HIGH (ref 10.3–14.5)
RBC # FLD: 19.3 % — HIGH (ref 10.3–14.5)
S AUREUS DNA NOSE QL NAA+PROBE: DETECTED
S AUREUS DNA NOSE QL NAA+PROBE: DETECTED
SAO2 % BLDV: 68.3 % — SIGNIFICANT CHANGE UP (ref 67–88)
SAO2 % BLDV: 68.3 % — SIGNIFICANT CHANGE UP (ref 67–88)
SODIUM SERPL-SCNC: 136 MMOL/L — SIGNIFICANT CHANGE UP (ref 135–145)
SODIUM SERPL-SCNC: 136 MMOL/L — SIGNIFICANT CHANGE UP (ref 135–145)
TRIGL SERPL-MCNC: 92 MG/DL — SIGNIFICANT CHANGE UP
TRIGL SERPL-MCNC: 92 MG/DL — SIGNIFICANT CHANGE UP
TSH SERPL-MCNC: 0.79 UIU/ML — SIGNIFICANT CHANGE UP (ref 0.27–4.2)
TSH SERPL-MCNC: 0.79 UIU/ML — SIGNIFICANT CHANGE UP (ref 0.27–4.2)
VANCOMYCIN FLD-MCNC: 7.8 UG/ML — SIGNIFICANT CHANGE UP
VANCOMYCIN FLD-MCNC: 7.8 UG/ML — SIGNIFICANT CHANGE UP
WBC # BLD: 11.29 K/UL — HIGH (ref 3.8–10.5)
WBC # BLD: 11.29 K/UL — HIGH (ref 3.8–10.5)
WBC # FLD AUTO: 11.29 K/UL — HIGH (ref 3.8–10.5)
WBC # FLD AUTO: 11.29 K/UL — HIGH (ref 3.8–10.5)

## 2023-12-03 PROCEDURE — 84165 PROTEIN E-PHORESIS SERUM: CPT | Mod: 26

## 2023-12-03 PROCEDURE — 99222 1ST HOSP IP/OBS MODERATE 55: CPT | Mod: GC

## 2023-12-03 PROCEDURE — 86335 IMMUNFIX E-PHORSIS/URINE/CSF: CPT | Mod: 26

## 2023-12-03 PROCEDURE — 84166 PROTEIN E-PHORESIS/URINE/CSF: CPT | Mod: 26

## 2023-12-03 PROCEDURE — 99232 SBSQ HOSP IP/OBS MODERATE 35: CPT

## 2023-12-03 RX ORDER — SODIUM CHLORIDE 9 MG/ML
1000 INJECTION, SOLUTION INTRAVENOUS
Refills: 0 | Status: DISCONTINUED | OUTPATIENT
Start: 2023-12-03 | End: 2023-12-03

## 2023-12-03 RX ORDER — MUPIROCIN 20 MG/G
1 OINTMENT TOPICAL
Refills: 0 | Status: DISCONTINUED | OUTPATIENT
Start: 2023-12-03 | End: 2023-12-12

## 2023-12-03 RX ORDER — INSULIN LISPRO 100/ML
VIAL (ML) SUBCUTANEOUS AT BEDTIME
Refills: 0 | Status: DISCONTINUED | OUTPATIENT
Start: 2023-12-03 | End: 2023-12-18

## 2023-12-03 RX ORDER — INSULIN LISPRO 100/ML
VIAL (ML) SUBCUTANEOUS
Refills: 0 | Status: DISCONTINUED | OUTPATIENT
Start: 2023-12-03 | End: 2023-12-18

## 2023-12-03 RX ORDER — INSULIN LISPRO 100/ML
1 VIAL (ML) SUBCUTANEOUS ONCE
Refills: 0 | Status: COMPLETED | OUTPATIENT
Start: 2023-12-03 | End: 2023-12-03

## 2023-12-03 RX ORDER — DEXTROSE 50 % IN WATER 50 %
25 SYRINGE (ML) INTRAVENOUS ONCE
Refills: 0 | Status: DISCONTINUED | OUTPATIENT
Start: 2023-12-03 | End: 2023-12-03

## 2023-12-03 RX ORDER — VANCOMYCIN HCL 1 G
1000 VIAL (EA) INTRAVENOUS ONCE
Refills: 0 | Status: COMPLETED | OUTPATIENT
Start: 2023-12-03 | End: 2023-12-03

## 2023-12-03 RX ORDER — DEXTROSE 50 % IN WATER 50 %
12.5 SYRINGE (ML) INTRAVENOUS ONCE
Refills: 0 | Status: DISCONTINUED | OUTPATIENT
Start: 2023-12-03 | End: 2023-12-03

## 2023-12-03 RX ORDER — GLUCAGON INJECTION, SOLUTION 0.5 MG/.1ML
1 INJECTION, SOLUTION SUBCUTANEOUS ONCE
Refills: 0 | Status: DISCONTINUED | OUTPATIENT
Start: 2023-12-03 | End: 2023-12-18

## 2023-12-03 RX ORDER — DEXTROSE 50 % IN WATER 50 %
15 SYRINGE (ML) INTRAVENOUS ONCE
Refills: 0 | Status: DISCONTINUED | OUTPATIENT
Start: 2023-12-03 | End: 2023-12-18

## 2023-12-03 RX ORDER — ACETAMINOPHEN 500 MG
1000 TABLET ORAL ONCE
Refills: 0 | Status: COMPLETED | OUTPATIENT
Start: 2023-12-03 | End: 2023-12-03

## 2023-12-03 RX ADMIN — HEPARIN SODIUM 5000 UNIT(S): 5000 INJECTION INTRAVENOUS; SUBCUTANEOUS at 21:14

## 2023-12-03 RX ADMIN — Medication 2: at 18:02

## 2023-12-03 RX ADMIN — Medication 2000 UNIT(S): at 12:47

## 2023-12-03 RX ADMIN — Medication 250 MILLIGRAM(S): at 21:46

## 2023-12-03 RX ADMIN — HEPARIN SODIUM 5000 UNIT(S): 5000 INJECTION INTRAVENOUS; SUBCUTANEOUS at 00:15

## 2023-12-03 RX ADMIN — CEFEPIME 100 MILLIGRAM(S): 1 INJECTION, POWDER, FOR SOLUTION INTRAMUSCULAR; INTRAVENOUS at 21:13

## 2023-12-03 RX ADMIN — MUPIROCIN 1 APPLICATION(S): 20 OINTMENT TOPICAL at 21:47

## 2023-12-03 RX ADMIN — ATORVASTATIN CALCIUM 40 MILLIGRAM(S): 80 TABLET, FILM COATED ORAL at 21:14

## 2023-12-03 RX ADMIN — Medication 1 UNIT(S): at 12:56

## 2023-12-03 RX ADMIN — Medication 250 MILLIGRAM(S): at 00:16

## 2023-12-03 RX ADMIN — Medication 1 TABLET(S): at 12:47

## 2023-12-03 RX ADMIN — Medication 650 MILLIGRAM(S): at 06:51

## 2023-12-03 RX ADMIN — Medication 400 MILLIGRAM(S): at 20:57

## 2023-12-03 RX ADMIN — Medication 1000 MILLIGRAM(S): at 21:30

## 2023-12-03 RX ADMIN — Medication 650 MILLIGRAM(S): at 06:21

## 2023-12-03 RX ADMIN — HEPARIN SODIUM 5000 UNIT(S): 5000 INJECTION INTRAVENOUS; SUBCUTANEOUS at 05:15

## 2023-12-03 RX ADMIN — HEPARIN SODIUM 5000 UNIT(S): 5000 INJECTION INTRAVENOUS; SUBCUTANEOUS at 12:48

## 2023-12-03 RX ADMIN — ATORVASTATIN CALCIUM 40 MILLIGRAM(S): 80 TABLET, FILM COATED ORAL at 00:15

## 2023-12-03 RX ADMIN — Medication 75 MEQ/KG/HR: at 00:25

## 2023-12-03 RX ADMIN — CEFEPIME 100 MILLIGRAM(S): 1 INJECTION, POWDER, FOR SOLUTION INTRAMUSCULAR; INTRAVENOUS at 00:16

## 2023-12-03 RX ADMIN — Medication 2: at 09:15

## 2023-12-03 NOTE — CONSULT NOTE ADULT - SUBJECTIVE AND OBJECTIVE BOX
Patient is a 63y old  Female who presents with a chief complaint of le edema/pain    HPI:  63-year-old female with past medical history of DM, HTN, HLD, chronic bilateral lower extremity lymphedema presents today by EMS for lower extremity edema with malodorous discharge. Patient states that the lower extremity has been getting progressively worse over the past month since last admission and with malodorous discharge. She also reports associated pain in LE. Patient notes that she has been having symptoms of dysuria, increased urinary frequency for 1 month.  Hospital course c/b an episode of lethargy and hypotension. Started on bipap briefly however pt did not tolerate and it was promptly discontinued with improvement in pt's mental status. Upon work up patient found to have WBC of 14.81 now 11.29 and , .7, A1c 6.9, BUN/Cr 83/3.72, UA with large leukocyte esterase, 84 WBC and many bacteria. XR of the L foot shows a cortical lucency of the fifth metatarsal. Podiatry was consulted with no acute surgical intervention and that the WBC is not likely from the foot lucency on XR.       REVIEW OF SYSTEMS  pending full examination      prior hospital charts reviewed [V]  primary team notes reviewed [V]  other consultant notes reviewed [V]    PAST MEDICAL & SURGICAL HISTORY:  Chronic Acquired Lymphedema      Adult-Onset Obesity      Iron Deficiency Anemia      DM (diabetes mellitus)      HTN (hypertension)      No significant past surgical history          SOCIAL HISTORY:  - Denied smoking/vaping/alcohol/recreational drug use    FAMILY HISTORY:  No pertinent family history in first degree relatives        Allergies  No Known Allergies        ANTIMICROBIALS:  cefepime   IVPB    cefepime   IVPB 1000 every 24 hours      ANTIMICROBIALS (past 90 days):  MEDICATIONS  (STANDING):  cefepime   IVPB   100 mL/Hr IV Intermittent (12-03-23 @ 00:16)    vancomycin  IVPB   250 mL/Hr IV Intermittent (12-03-23 @ 00:16)        OTHER MEDS:   MEDICATIONS  (STANDING):  acetaminophen     Tablet .. 650 every 6 hours PRN  atorvastatin 40 at bedtime  dextrose 50% Injectable 25 once  dextrose 50% Injectable 12.5 once  dextrose 50% Injectable 25 once  dextrose Oral Gel 15 once PRN  dextrose Oral Gel 15 once PRN  glucagon  Injectable 1 once  glucagon  Injectable 1 once  heparin   Injectable 5000 every 8 hours  influenza   Vaccine 0.5 once  insulin lispro (ADMELOG) corrective regimen sliding scale  three times a day before meals  insulin lispro (ADMELOG) corrective regimen sliding scale  at bedtime  melatonin 3 at bedtime PRN      VITALS:  Vital Signs Last 24 Hrs  T(F): 97.9 (12-03-23 @ 14:02), Max: 99 (12-02-23 @ 16:04)    Vital Signs Last 24 Hrs  HR: 85 (12-03-23 @ 14:02) (85 - 101)  BP: 115/56 (12-03-23 @ 14:02) (101/51 - 122/60)  RR: 18 (12-03-23 @ 14:02)  SpO2: 99% (12-03-23 @ 14:02) (99% - 100%)  Wt(kg): --    EXAM:  pending full examination      Labs:                        7.8    11.29 )-----------( 400      ( 03 Dec 2023 05:27 )             23.2     12-03    136  |  104  |  83<H>  ----------------------------<  171<H>  3.5   |  17<L>  |  3.72<H>    Ca    7.7<L>      03 Dec 2023 05:27  Phos  5.1     12-03  Mg     2.30     12-03    TPro  7.7  /  Alb  x   /  TBili  x   /  DBili  x   /  AST  x   /  ALT  x   /  AlkPhos  x   12-03      WBC Trend:  WBC Count: 11.29 (12-03-23 @ 05:27)  WBC Count: 13.75 (12-02-23 @ 07:41)  WBC Count: 14.81 (12-02-23 @ 05:37)      Auto Neutrophil #: 11.33 K/uL (12-02-23 @ 07:41)  Auto Neutrophil #: 11.96 K/uL (12-02-23 @ 05:37)  Auto Neutrophil #: 3.27 K/uL (11-13-23 @ 06:35)  Auto Neutrophil #: 4.34 K/uL (11-07-23 @ 23:17)  Auto Neutrophil #: 4.74 K/uL (10-31-23 @ 12:50)      Creatine Trend:  Creatinine: 3.72 (12-03)  Creatinine: 4.67 (12-02)  Creatinine: 4.66 (12-02)  Creatinine: 4.43 (12-02)      Liver Biochemical Testing Trend:  Alanine Aminotransferase (ALT/SGPT): 33 (12-02)  Alanine Aminotransferase (ALT/SGPT): 33 (12-02)  Alanine Aminotransferase (ALT/SGPT): 41 *H* (12-02)  Alanine Aminotransferase (ALT/SGPT): 14 (11-13)  Alanine Aminotransferase (ALT/SGPT): 18 (11-07)  Aspartate Aminotransferase (AST/SGOT): 21 (12-02-23 @ 07:41)  Aspartate Aminotransferase (AST/SGOT): 22 (12-02-23 @ 06:55)  Aspartate Aminotransferase (AST/SGOT): 44 (12-02-23 @ 05:37)  Aspartate Aminotransferase (AST/SGOT): 15 (11-13-23 @ 06:35)  Aspartate Aminotransferase (AST/SGOT): 30 (11-07-23 @ 23:17)  Bilirubin Total: 0.4 (12-02)  Bilirubin Total: 0.5 (12-02)  Bilirubin Total: 0.5 (12-02)  Bilirubin Total: 0.2 (11-13)  Bilirubin Total: 0.4 (11-07)    Trend LDH      Auto Eosinophil %: 0.4 % (12-02-23 @ 07:41)  Auto Eosinophil %: 0.5 % (12-02-23 @ 05:37)    Urinalysis Basic - ( 03 Dec 2023 05:27 )    Color: x / Appearance: x / SG: x / pH: x  Gluc: 171 mg/dL / Ketone: x  / Bili: x / Urobili: x   Blood: x / Protein: x / Nitrite: x   Leuk Esterase: x / RBC: x / WBC x   Sq Epi: x / Non Sq Epi: x / Bacteria: x    MICROBIOLOGY:  Culture - Urine (collected 31 Oct 2023 16:20)  Source: Clean Catch Clean Catch (Midstream)  Final Report:    >100,000 CFU/ml Escherichia coli    <10,000 CFU/ml Normal Urogenital lon present  Organism: Escherichia coli  Organism: Escherichia coli    Sensitivities:      -  Levofloxacin: S <=0.5      -  Tobramycin: S <=2      -  Nitrofurantoin: S <=32 Should not be used to treat pyelonephritis      -  Aztreonam: S <=4      -  Gentamicin: S <=2      -  Cefazolin: S <=2 For uncomplicated UTI with K. pneumoniae, E. coli, or P. mirablis: ROBERTO <=16 is sensitive and ROBERTO >=32 is resistant. This also predicts results for oral agents cefaclor, cefdinir, cefpodoxime, cefprozil, cefuroxime axetil, cephalexin and locarbef for uncomplicated UTI. Note that some isolates may be susceptible to these agents while testing resistant to cefazolin.      -  Cefepime: S <=2      -  Piperacillin/Tazobactam: S <=8      -  Ciprofloxacin: S <=0.25      -  Imipenem: S <=1      -  Ceftriaxone: S <=1      -  Ampicillin: R >16 These ampicillin results predict results for amoxicillin      Method Type: ROBERTO      -  Meropenem: S <=1      -  Ampicillin/Sulbactam: I 16/8      -  Cefoxitin: S <=8      -  Cefuroxime: S <=4      -  Amikacin: S <=16      -  Amoxicillin/Clavulanic Acid: S <=8/4      -  Trimethoprim/Sulfamethoxazole: R >2/38      -  Ertapenem: S <=0.5    COVID-19 PCR: Detected (11-10-23 @ 10:27)      Procalcitonin, Serum: 0.70 (12-03)    C-Reactive Protein, Serum: 228.7 (12-03)    Blood Gas Venous - Lactate: 1.6 (12-03 @ 05:27)  Blood Gas Venous - Lactate: 1.9 (12-02 @ 07:41)  Blood Gas Venous - Lactate: 1.1 (12-02 @ 06:55)  Blood Gas Venous - Lactate: 2.2 (12-02 @ 05:37)    A1C with Estimated Average Glucose Result: 6.9 % (12-03-23 @ 05:27)  A1C with Estimated Average Glucose Result: 6.9 % (11-01-23 @ 08:20)      RADIOLOGY:  < from: Xray Foot AP + Lateral + Oblique, Bilat (12.02.23 @ 06:07) >  IMPRESSION:  Cortical lucency along the medial aspect of the left fifth metatarsal   head may represent erosion. Correlate clinically and can obtain MR foot   with IV contrast for further evaluation.    Extensive soft tissue edema bilaterally.    < from: Xray Chest 1 View- PORTABLE-Urgent (Xray Chest 1 View- PORTABLE-Urgent .) (12.02.23 @ 11:45) >  IMPRESSION:  Right apex and the left lung are obscured by multiple external overlying   opacities.  Partially visualized lung fields are clear.     Patient is a 63y old  Female who presents with a chief complaint of le edema/pain    HPI:  63-year-old female with past medical history of DM, HTN, HLD, chronic bilateral lower extremity lymphedema presents today by EMS for lower extremity edema with malodorous discharge. Patient states that the lower extremity has been getting progressively worse over the past month since last admission and with malodorous discharge. She also reports associated pain in LE. Patient notes that she has been having symptoms of dysuria, increased urinary frequency for 1 month.  Hospital course c/b an episode of lethargy and hypotension. Started on bipap briefly however pt did not tolerate and it was promptly discontinued with improvement in pt's mental status. Upon work up patient found to have WBC of 14.81 now 11.29 and , .7, A1c 6.9, BUN/Cr 83/3.72, UA with large leukocyte esterase, 84 WBC and many bacteria. XR of the L foot shows a cortical lucency of the fifth metatarsal. Podiatry was consulted with no acute surgical intervention and that the WBC is not likely from the foot lucency on XR. Patient currently states that her legs are bothering her and that they feel similar or that the L hurts slightly more. She states she feels weak overall. Patient also states that someone is trying to steal her money.     REVIEW OF SYSTEMS  Constitutional: No fevers, chills, weight loss. Positive fatigue   Skin: No rash, no phlebitis	  Eyes: No discharge	  ENMT: No sore throat, oral thrush, ulcers or exudate  Respiratory: No cough, no SOB  Cardiovascular:  No chest pain, palpitations or edema   Gastrointestinal: No pain, nausea, vomiting, diarrhea or constipation	  Genitourinary: No dysuria, discharge or flank pain  MSK: No arthralgias or back pain. Positive b/l Leg pain  Neurological: No HA, no weakness, no seizures, possible AMS       prior hospital charts reviewed [V]  primary team notes reviewed [V]  other consultant notes reviewed [V]    PAST MEDICAL & SURGICAL HISTORY:  Chronic Acquired Lymphedema      Adult-Onset Obesity      Iron Deficiency Anemia      DM (diabetes mellitus)      HTN (hypertension)      No significant past surgical history          SOCIAL HISTORY:  - Denied smoking/vaping/alcohol/recreational drug use, lives with daughter, denies travel, denies sick contacts,     FAMILY HISTORY:  No pertinent family history in first degree relatives        Allergies  No Known Allergies        ANTIMICROBIALS:  cefepime   IVPB    cefepime   IVPB 1000 every 24 hours      ANTIMICROBIALS (past 90 days):  MEDICATIONS  (STANDING):  cefepime   IVPB   100 mL/Hr IV Intermittent (12-03-23 @ 00:16)    vancomycin  IVPB   250 mL/Hr IV Intermittent (12-03-23 @ 00:16)        OTHER MEDS:   MEDICATIONS  (STANDING):  acetaminophen     Tablet .. 650 every 6 hours PRN  atorvastatin 40 at bedtime  dextrose 50% Injectable 25 once  dextrose 50% Injectable 12.5 once  dextrose 50% Injectable 25 once  dextrose Oral Gel 15 once PRN  dextrose Oral Gel 15 once PRN  glucagon  Injectable 1 once  glucagon  Injectable 1 once  heparin   Injectable 5000 every 8 hours  influenza   Vaccine 0.5 once  insulin lispro (ADMELOG) corrective regimen sliding scale  three times a day before meals  insulin lispro (ADMELOG) corrective regimen sliding scale  at bedtime  melatonin 3 at bedtime PRN      VITALS:  Vital Signs Last 24 Hrs  T(F): 97.9 (12-03-23 @ 14:02), Max: 99 (12-02-23 @ 16:04)    Vital Signs Last 24 Hrs  HR: 85 (12-03-23 @ 14:02) (85 - 101)  BP: 115/56 (12-03-23 @ 14:02) (101/51 - 122/60)  RR: 18 (12-03-23 @ 14:02)  SpO2: 99% (12-03-23 @ 14:02) (99% - 100%)  Wt(kg): --    EXAM:  REFUSED EXAMINATION       Labs:                        7.8    11.29 )-----------( 400      ( 03 Dec 2023 05:27 )             23.2     12-03    136  |  104  |  83<H>  ----------------------------<  171<H>  3.5   |  17<L>  |  3.72<H>    Ca    7.7<L>      03 Dec 2023 05:27  Phos  5.1     12-03  Mg     2.30     12-03    TPro  7.7  /  Alb  x   /  TBili  x   /  DBili  x   /  AST  x   /  ALT  x   /  AlkPhos  x   12-03      WBC Trend:  WBC Count: 11.29 (12-03-23 @ 05:27)  WBC Count: 13.75 (12-02-23 @ 07:41)  WBC Count: 14.81 (12-02-23 @ 05:37)      Auto Neutrophil #: 11.33 K/uL (12-02-23 @ 07:41)  Auto Neutrophil #: 11.96 K/uL (12-02-23 @ 05:37)  Auto Neutrophil #: 3.27 K/uL (11-13-23 @ 06:35)  Auto Neutrophil #: 4.34 K/uL (11-07-23 @ 23:17)  Auto Neutrophil #: 4.74 K/uL (10-31-23 @ 12:50)      Creatine Trend:  Creatinine: 3.72 (12-03)  Creatinine: 4.67 (12-02)  Creatinine: 4.66 (12-02)  Creatinine: 4.43 (12-02)      Liver Biochemical Testing Trend:  Alanine Aminotransferase (ALT/SGPT): 33 (12-02)  Alanine Aminotransferase (ALT/SGPT): 33 (12-02)  Alanine Aminotransferase (ALT/SGPT): 41 *H* (12-02)  Alanine Aminotransferase (ALT/SGPT): 14 (11-13)  Alanine Aminotransferase (ALT/SGPT): 18 (11-07)  Aspartate Aminotransferase (AST/SGOT): 21 (12-02-23 @ 07:41)  Aspartate Aminotransferase (AST/SGOT): 22 (12-02-23 @ 06:55)  Aspartate Aminotransferase (AST/SGOT): 44 (12-02-23 @ 05:37)  Aspartate Aminotransferase (AST/SGOT): 15 (11-13-23 @ 06:35)  Aspartate Aminotransferase (AST/SGOT): 30 (11-07-23 @ 23:17)  Bilirubin Total: 0.4 (12-02)  Bilirubin Total: 0.5 (12-02)  Bilirubin Total: 0.5 (12-02)  Bilirubin Total: 0.2 (11-13)  Bilirubin Total: 0.4 (11-07)    Trend LDH      Auto Eosinophil %: 0.4 % (12-02-23 @ 07:41)  Auto Eosinophil %: 0.5 % (12-02-23 @ 05:37)    Urinalysis Basic - ( 03 Dec 2023 05:27 )    Color: x / Appearance: x / SG: x / pH: x  Gluc: 171 mg/dL / Ketone: x  / Bili: x / Urobili: x   Blood: x / Protein: x / Nitrite: x   Leuk Esterase: x / RBC: x / WBC x   Sq Epi: x / Non Sq Epi: x / Bacteria: x    MICROBIOLOGY:  Culture - Urine (collected 31 Oct 2023 16:20)  Source: Clean Catch Clean Catch (Midstream)  Final Report:    >100,000 CFU/ml Escherichia coli    <10,000 CFU/ml Normal Urogenital lon present  Organism: Escherichia coli  Organism: Escherichia coli    Sensitivities:      -  Levofloxacin: S <=0.5      -  Tobramycin: S <=2      -  Nitrofurantoin: S <=32 Should not be used to treat pyelonephritis      -  Aztreonam: S <=4      -  Gentamicin: S <=2      -  Cefazolin: S <=2 For uncomplicated UTI with K. pneumoniae, E. coli, or P. mirablis: ROBERTO <=16 is sensitive and ROBERTO >=32 is resistant. This also predicts results for oral agents cefaclor, cefdinir, cefpodoxime, cefprozil, cefuroxime axetil, cephalexin and locarbef for uncomplicated UTI. Note that some isolates may be susceptible to these agents while testing resistant to cefazolin.      -  Cefepime: S <=2      -  Piperacillin/Tazobactam: S <=8      -  Ciprofloxacin: S <=0.25      -  Imipenem: S <=1      -  Ceftriaxone: S <=1      -  Ampicillin: R >16 These ampicillin results predict results for amoxicillin      Method Type: ROBERTO      -  Meropenem: S <=1      -  Ampicillin/Sulbactam: I 16/8      -  Cefoxitin: S <=8      -  Cefuroxime: S <=4      -  Amikacin: S <=16      -  Amoxicillin/Clavulanic Acid: S <=8/4      -  Trimethoprim/Sulfamethoxazole: R >2/38      -  Ertapenem: S <=0.5    COVID-19 PCR: Detected (11-10-23 @ 10:27)      Procalcitonin, Serum: 0.70 (12-03)    C-Reactive Protein, Serum: 228.7 (12-03)    Blood Gas Venous - Lactate: 1.6 (12-03 @ 05:27)  Blood Gas Venous - Lactate: 1.9 (12-02 @ 07:41)  Blood Gas Venous - Lactate: 1.1 (12-02 @ 06:55)  Blood Gas Venous - Lactate: 2.2 (12-02 @ 05:37)    A1C with Estimated Average Glucose Result: 6.9 % (12-03-23 @ 05:27)  A1C with Estimated Average Glucose Result: 6.9 % (11-01-23 @ 08:20)      RADIOLOGY:  < from: Xray Foot AP + Lateral + Oblique, Bilat (12.02.23 @ 06:07) >  IMPRESSION:  Cortical lucency along the medial aspect of the left fifth metatarsal   head may represent erosion. Correlate clinically and can obtain MR foot   with IV contrast for further evaluation.    Extensive soft tissue edema bilaterally.    < from: Xray Chest 1 View- PORTABLE-Urgent (Xray Chest 1 View- PORTABLE-Urgent .) (12.02.23 @ 11:45) >  IMPRESSION:  Right apex and the left lung are obscured by multiple external overlying   opacities.  Partially visualized lung fields are clear.

## 2023-12-03 NOTE — PROGRESS NOTE ADULT - PROBLEM SELECTOR PLAN 1
-pt p/w leukocytosis, tachy with multiple potential sources of infection -skin/soft tissue, OM, UTI, PNA  -treat empirically with vanc (by level) and cefepime -- ID in agreement  -f/u MRI of left foot to r/o OM of 5th digit   f/u blood and urine cultures  -f/u MRSA swab  - reconsult podiatry if MRI findings c/f OM  - ID consulted, appreciate recs

## 2023-12-03 NOTE — CONSULT NOTE ADULT - ASSESSMENT
Impression:  63-year-old female with past medical history of DM, HTN, HLD, chronic bilateral lower extremity lymphedema presents today by EMS for lower extremity edema with malodorous discharge. Patient states that the lower extremity has been getting progressively worse over the past month since last admission and with malodorous discharge. She also reports associated pain in LE. Patient notes that she has been having symptoms of dysuria, increased urinary frequency for 1 month.  Hospital course c/b an episode of lethargy and hypotension. Started on bipap briefly however pt did not tolerate and it was promptly discontinued with improvement in pt's mental status. Upon work up patient found to have WBC of 14.81 now 11.29 and , .7, A1c 6.9, BUN/Cr 83/3.72, UA with large leukocyte esterase, 84 WBC and many bacteria. XR of the L foot shows a cortical lucency of the fifth metatarsal. Podiatry was consulted with no acute surgical intervention and that the WBC is not likely from the foot lucency on XR.    Antimicrobials:  vancomycin 11/2  cefepime 11/2 - current    Assessment:  *LE soft tissue infection with underlying lymphedema, with current malodorous discharge  *Sepsis as evidenced by leukocytosis and tachycardia secondary to above  *UTI with UA showing large leukocyte esterase, 84 WBC and many bacteria and patient complaints of dysuria and frequency  *L fifth metatarsal lucency seen on XR with elevated ESR and CRP likely indicative of osteomyelitis   *DM A1c 6.9    Recommendations: PLEASE DEFER ALL CHANGES IN PLAN UNTIL SIGNED BY ATTENDING. All recommendations are tentative pending Attending Attestation.  - continue Cefepime to cover for pseudomonas considering patients DM hx along with treat UTI  - continue vancomycin to cover strept and staph species for soft tissue infection  - obtain blood cultures x2   - wound care   - trend fever and WBC curve to monitor the progression of infection   - will adjust antimicrobial therapy based off of culture and sensitivity     Jakob Ayoub DO, PGY-4   Infectious Disease Fellow  [facility] Teams Preferred  After 5pm/weekends call [phone]  Impression:  63-year-old female with past medical history of DM, HTN, HLD, chronic bilateral lower extremity lymphedema presents today by EMS for lower extremity edema with malodorous discharge. Patient states that the lower extremity has been getting progressively worse over the past month since last admission and with malodorous discharge. She also reports associated pain in LE. Patient notes that she has been having symptoms of dysuria, increased urinary frequency for 1 month.  Hospital course c/b an episode of lethargy and hypotension. Started on bipap briefly however pt did not tolerate and it was promptly discontinued with improvement in pt's mental status. Upon work up patient found to have WBC of 14.81 now 11.29 and , .7, A1c 6.9, BUN/Cr 83/3.72, UA with large leukocyte esterase, 84 WBC and many bacteria. XR of the L foot shows a cortical lucency of the fifth metatarsal. Podiatry was consulted with no acute surgical intervention and that the WBC is not likely from the foot lucency on XR.    Antimicrobials:  vancomycin 11/2  cefepime 11/2 - current    Assessment:  *LE soft tissue infection with underlying lymphedema, with current malodorous discharge  *Sepsis as evidenced by leukocytosis and tachycardia secondary to above  *UTI with UA showing large leukocyte esterase, 84 WBC and many bacteria and patient complaints of dysuria and frequency  *L fifth metatarsal lucency seen on XR with elevated ESR and CRP likely indicative of osteomyelitis   *DM A1c 6.9    Recommendations: PLEASE DEFER ALL CHANGES IN PLAN UNTIL SIGNED BY ATTENDING. All recommendations are tentative pending Attending Attestation.  - continue Cefepime to cover for pseudomonas considering patients DM hx along with treat UTI  - continue vancomycin to cover strept and staph species for soft tissue infection  - obtain blood cultures x2   - wound care   - trend fever and WBC curve to monitor the progression of infection   - will adjust antimicrobial therapy based off of culture and sensitivity     Jakob Ayoub DO, PGY-4   Infectious Disease Fellow  [facility] Teams Preferred  After 5pm/weekends call [phone]  Impression:  63-year-old female with past medical history of DM, HTN, HLD, chronic bilateral lower extremity lymphedema presents today by EMS for lower extremity edema with malodorous discharge. Patient states that the lower extremity has been getting progressively worse over the past month since last admission and with malodorous discharge. She also reports associated pain in LE. Patient notes that she has been having symptoms of dysuria, increased urinary frequency for 1 month.  Hospital course c/b an episode of lethargy and hypotension. Started on bipap briefly however pt did not tolerate and it was promptly discontinued with improvement in pt's mental status. Upon work up patient found to have WBC of 14.81 now 11.29 and , .7, A1c 6.9, BUN/Cr 83/3.72, UA with large leukocyte esterase, 84 WBC and many bacteria. XR of the L foot shows a cortical lucency of the fifth metatarsal. Podiatry was consulted with no acute surgical intervention and that the WBC is not likely from the foot lucency on XR.    Antimicrobials:  vancomycin 11/2  cefepime 11/2 - current    Assessment:  *LE soft tissue infection with underlying lymphedema, with current malodorous discharge  *Sepsis as evidenced by leukocytosis and tachycardia secondary to above  *UTI with UA showing large leukocyte esterase, 84 WBC and many bacteria and patient complaints of dysuria and frequency  *L fifth metatarsal lucency seen on XR with elevated ESR and CRP likely indicative of osteomyelitis   *DM A1c 6.9    Recommendations:   - continue Cefepime to cover for pseudomonas considering patients DM hx along with treat UTI  - continue vancomycin to cover strept and staph species for soft tissue infection  - obtain blood cultures x2   - wound care of lower extremities, please reach out to ID before dressing change so the wound can be examined before next wound care dressing  - trend fever and WBC curve to monitor the progression of infection   - will adjust antimicrobial therapy based off of culture and sensitivity   - if possible extend to the MRI that is ordered of the foot to also image the leg so the soft tissue wound can also be further assessed, if not would obtain CT scan of the Left lower extremity    Jakob Ayoub DO, PGY-4   Infectious Disease Fellow  [facility] Teams Preferred   Impression:  63-year-old female with past medical history of DM, HTN, HLD, chronic bilateral lower extremity lymphedema presents today by EMS for lower extremity edema with malodorous discharge. Patient states that the lower extremity has been getting progressively worse over the past month since last admission and with malodorous discharge. She also reports associated pain in LE. Patient notes that she has been having symptoms of dysuria, increased urinary frequency for 1 month.  Hospital course c/b an episode of lethargy and hypotension. Started on bipap briefly however pt did not tolerate and it was promptly discontinued with improvement in pt's mental status. Upon work up patient found to have WBC of 14.81 now 11.29 and , .7, A1c 6.9, BUN/Cr 83/3.72, UA with large leukocyte esterase, 84 WBC and many bacteria. XR of the L foot shows a cortical lucency of the fifth metatarsal. Podiatry was consulted with no acute surgical intervention and that the WBC is not likely from the foot lucency on XR.    Antimicrobials:  vancomycin 11/2  cefepime 11/2 - current    Assessment:  *LE soft tissue infection with underlying lymphedema, with current malodorous discharge  *Sepsis as evidenced by leukocytosis and tachycardia secondary to above  *UTI with UA showing large leukocyte esterase, 84 WBC and many bacteria and patient complaints of dysuria and frequency  *L fifth metatarsal lucency seen on XR with elevated ESR and CRP likely indicative of osteomyelitis   *DM A1c 6.9    Recommendations:   - continue Cefepime to cover for pseudomonas considering patients DM hx along with treat UTI  - continue vancomycin to cover strept and staph species for soft tissue infection  - obtain blood cultures x2   - wound care of lower extremities, please reach out to ID before dressing change so the wound can be examined before next wound care dressing  - trend fever and WBC curve to monitor the progression of infection   - will adjust antimicrobial therapy based off of culture and sensitivity   - if possible extend to the MRI that is ordered of the foot to also image the leg so the soft tissue wound can also be further assessed, if not would obtain CT scan of the Left lower extremity    Jakob Ayoub DO, PGY-4   Infectious Disease Fellow  [facility] Teams Preferred

## 2023-12-03 NOTE — CONSULT NOTE ADULT - ATTENDING COMMENTS
This is a 64 y/o F w/ PMHx of DM, HTN, HLD, chronic b/l lymphedema who presents to [facility] on 12/2 for LE wounds with malodorous discharge that has been worsening. Also w/ dysuria.  Pt was briefly lethargic/hypotensive, started on BiPAP but quickly discontinued. Pt was afebrile, WBC 14.8.   Seen by podiatry for L foot bullae, no findings of active infection. L foot XR 5th MT head possible erosion. U/A with 84 WBCs.   ID now consulted for leukocytosis in the setting of LE drainage, positive U/A and L foot XR w/ 5th MT erosion     #LE lymphedema, with malodorous discharge  #Sepsis  #UTI  *L fifth metatarsal lucency seen on XR with elevated ESR and CRP likely indicative of osteomyelitis   *DM A1c 6.9    Overall leukocytosis possible to SSTI, OM, UTI. Would continue with empiric coverage with Vancomycin/Cefepime, f/u UCx. Obtain imaging of LLE.     Plan:   1. C/w Vancomycin by level, check trough in the AM, if < 15, dose 1 g   2. C/w Cefepime   3. Wound care c/s, please have them message when dressing wound   4. Would obtain further imaging of L leg, can extend MRI to include tib/fib    Thank you for this consult. Inpatient ID team will follow.    Jose Alberto Tomas M.D.  Attending Physician  Division of Infectious Diseases  Department of Medicine    Please contact through MS Teams message.  Office: [phone] (after 5 PM or weekend) This is a 64 y/o F w/ PMHx of DM, HTN, HLD, chronic b/l lymphedema who presents to [facility] on 12/2 for LE wounds with malodorous discharge that has been worsening. Also w/ dysuria.  Pt was briefly lethargic/hypotensive, started on BiPAP but quickly discontinued. Pt was afebrile, WBC 14.8.   Seen by podiatry for L foot bullae, no findings of active infection. L foot XR 5th MT head possible erosion. U/A with 84 WBCs.   ID now consulted for leukocytosis in the setting of LE drainage, positive U/A and L foot XR w/ 5th MT erosion     #LE lymphedema, with malodorous discharge  #Sepsis  #UTI  *L fifth metatarsal lucency seen on XR with elevated ESR and CRP likely indicative of osteomyelitis   *DM A1c 6.9    Overall leukocytosis possible to SSTI, OM, UTI. Would continue with empiric coverage with Vancomycin/Cefepime, f/u UCx. Obtain imaging of LLE.     Plan:   1. C/w Vancomycin by level, check trough in the AM, if < 15, dose 1 g   2. C/w Cefepime   3. Wound care c/s, please have them message when dressing wound   4. Would obtain further imaging of L leg, can extend MRI to include tib/fib    Thank you for this consult. Inpatient ID team will follow.    Jose Alberto Tomas M.D.  Attending Physician  Division of Infectious Diseases  Department of Medicine    Please contact through MS Teams message.  Office: [phone] (after 5 PM or weekend)

## 2023-12-03 NOTE — PROGRESS NOTE ADULT - PROBLEM SELECTOR PLAN 4
-episode of hypotension with AMS otherwise no resp distress or desaturation. Pt was started on bipap at the time for unclear reason   -ddx includes 2/2 sepsis, hypovolemia   -BP stable with no evidence of lactic acidosis or end organ damage   -c/w antibiotics  -continue to monitor

## 2023-12-03 NOTE — PROGRESS NOTE ADULT - SUBJECTIVE AND OBJECTIVE BOX
[facility] Department of Hospital Medicine  Mercy Alarcon DO  Available on MS Teams  Pager: [phone]    Patient is a 63y old  Female who presents with a chief complaint of le edema/pain (03 Dec 2023 14:48)    Subjective:  Pt seen and examined at bedside sleeping comfortably, easily arousable. Still with pain/weeping of LLE. Otherwise denies complaints.    VITAL SIGNS:  T(C): 36.6 (12-03-23 @ 14:02), Max: 37.2 (12-02-23 @ 22:00)  T(F): 97.9 (12-03-23 @ 14:02), Max: 99 (12-02-23 @ 22:00)  HR: 85 (12-03-23 @ 14:02) (85 - 100)  BP: 115/56 (12-03-23 @ 14:02) (101/51 - 122/60)  BP(mean): --  RR: 18 (12-03-23 @ 14:02) (18 - 19)  SpO2: 99% (12-03-23 @ 14:02) (99% - 100%)  Wt(kg): --    PHYSICAL EXAM:  Constitutional: sleeping comfortably in bed; NAD  Head: NC/AT  Eyes: PERRL, EOMI, anicteric sclera  ENT: no nasal discharge; MMM  Neck: supple; no JVD  Respiratory: CTA B/L; no W/R/R  Cardiac: +S1/S2; RRR; no M/R/G  Gastrointestinal: soft, NT/ND; no rebound or guarding; +BSx4  Extremities: WWP, no clubbing or cyanosis; BL LE lymphedema  Musculoskeletal: moves all extremities spontaneously   Dermatologic: severe BL LE lymphedema with overlying hyperkeratosis and weeping; LLE wrapped in recently changed gauze   Neurologic: AAOx3; CNII-XII grossly intact; no focal deficits    MEDICATIONS  (STANDING):  atorvastatin 40 milliGRAM(s) Oral at bedtime  cefepime   IVPB      cefepime   IVPB 1000 milliGRAM(s) IV Intermittent every 24 hours  cholecalciferol 2000 Unit(s) Oral daily  dextrose 50% Injectable 25 Gram(s) IV Push once  dextrose 50% Injectable 25 Gram(s) IV Push once  dextrose 50% Injectable 12.5 Gram(s) IV Push once  glucagon  Injectable 1 milliGRAM(s) IntraMuscular once  glucagon  Injectable 1 milliGRAM(s) IntraMuscular once  heparin   Injectable 5000 Unit(s) SubCutaneous every 8 hours  influenza   Vaccine 0.5 milliLiter(s) IntraMuscular once  insulin lispro (ADMELOG) corrective regimen sliding scale   SubCutaneous three times a day before meals  insulin lispro (ADMELOG) corrective regimen sliding scale   SubCutaneous at bedtime  multivitamin 1 Tablet(s) Oral daily    MEDICATIONS  (PRN):  acetaminophen     Tablet .. 650 milliGRAM(s) Oral every 6 hours PRN Temp greater or equal to 38C (100.4F), Mild Pain (1 - 3)  dextrose Oral Gel 15 Gram(s) Oral once PRN Blood Glucose LESS THAN 70 milliGRAM(s)/deciliter  dextrose Oral Gel 15 Gram(s) Oral once PRN Blood Glucose LESS THAN 70 milliGRAM(s)/deciliter  melatonin 3 milliGRAM(s) Oral at bedtime PRN Insomnia    LABS:                        7.8    11.29 )-----------( 400      ( 03 Dec 2023 05:27 )             23.2     12-03    136  |  104  |  83<H>  ----------------------------<  171<H>  3.5   |  17<L>  |  3.72<H>    Ca    7.7<L>      03 Dec 2023 05:27  Phos  5.1     12-03  Mg     2.30     12-03    TPro  7.7  /  Alb  x   /  TBili  x   /  DBili  x   /  AST  x   /  ALT  x   /  AlkPhos  x   12-03    PT/INR - ( 02 Dec 2023 07:41 )   PT: 13.3 sec;   INR: 1.18 ratio         PTT - ( 02 Dec 2023 07:41 )  PTT:27.3 sec  Urinalysis Basic - ( 03 Dec 2023 05:27 )    Color: x / Appearance: x / SG: x / pH: x  Gluc: 171 mg/dL / Ketone: x  / Bili: x / Urobili: x   Blood: x / Protein: x / Nitrite: x   Leuk Esterase: x / RBC: x / WBC x   Sq Epi: x / Non Sq Epi: x / Bacteria: x      CAPILLARY BLOOD GLUCOSE      POCT Blood Glucose.: 159 mg/dL (03 Dec 2023 12:11)      RADIOLOGY & ADDITIONAL TESTS: Reviewed.   [facility] Department of Hospital Medicine  Mercy Alarcon DO  Available on MS Teams  Pager: [phone]    Patient is a 63y old  Female who presents with a chief complaint of le edema/pain (03 Dec 2023 14:48)    Subjective:  Pt seen and examined at bedside sleeping comfortably, easily arousable. Still with pain/weeping of LLE. Otherwise denies complaints.    VITAL SIGNS:  T(C): 36.6 (12-03-23 @ 14:02), Max: 37.2 (12-02-23 @ 22:00)  T(F): 97.9 (12-03-23 @ 14:02), Max: 99 (12-02-23 @ 22:00)  HR: 85 (12-03-23 @ 14:02) (85 - 100)  BP: 115/56 (12-03-23 @ 14:02) (101/51 - 122/60)  BP(mean): --  RR: 18 (12-03-23 @ 14:02) (18 - 19)  SpO2: 99% (12-03-23 @ 14:02) (99% - 100%)  Wt(kg): --    PHYSICAL EXAM:  Constitutional: sleeping comfortably in bed; NAD  Head: NC/AT  Eyes: PERRL, EOMI, anicteric sclera  ENT: no nasal discharge; MMM  Neck: supple; no JVD  Respiratory: CTA B/L; no W/R/R  Cardiac: +S1/S2; RRR; no M/R/G  Gastrointestinal: soft, NT/ND; no rebound or guarding; +BSx4  Extremities: WWP, no clubbing or cyanosis; BL LE lymphedema  Musculoskeletal: moves all extremities spontaneously   Dermatologic: severe BL LE lymphedema with overlying hyperkeratosis and weeping; LLE wrapped in recently changed gauze   Neurologic: AAOx3; CNII-XII grossly intact; no focal deficits    MEDICATIONS  (STANDING):  atorvastatin 40 milliGRAM(s) Oral at bedtime  cefepime   IVPB      cefepime   IVPB 1000 milliGRAM(s) IV Intermittent every 24 hours  cholecalciferol 2000 Unit(s) Oral daily  dextrose 50% Injectable 25 Gram(s) IV Push once  dextrose 50% Injectable 25 Gram(s) IV Push once  dextrose 50% Injectable 12.5 Gram(s) IV Push once  glucagon  Injectable 1 milliGRAM(s) IntraMuscular once  glucagon  Injectable 1 milliGRAM(s) IntraMuscular once  heparin   Injectable 5000 Unit(s) SubCutaneous every 8 hours  influenza   Vaccine 0.5 milliLiter(s) IntraMuscular once  insulin lispro (ADMELOG) corrective regimen sliding scale   SubCutaneous three times a day before meals  insulin lispro (ADMELOG) corrective regimen sliding scale   SubCutaneous at bedtime  multivitamin 1 Tablet(s) Oral daily    MEDICATIONS  (PRN):  acetaminophen     Tablet .. 650 milliGRAM(s) Oral every 6 hours PRN Temp greater or equal to 38C (100.4F), Mild Pain (1 - 3)  dextrose Oral Gel 15 Gram(s) Oral once PRN Blood Glucose LESS THAN 70 milliGRAM(s)/deciliter  dextrose Oral Gel 15 Gram(s) Oral once PRN Blood Glucose LESS THAN 70 milliGRAM(s)/deciliter  melatonin 3 milliGRAM(s) Oral at bedtime PRN Insomnia    LABS:                        7.8    11.29 )-----------( 400      ( 03 Dec 2023 05:27 )             23.2     12-03    136  |  104  |  83<H>  ----------------------------<  171<H>  3.5   |  17<L>  |  3.72<H>    Ca    7.7<L>      03 Dec 2023 05:27  Phos  5.1     12-03  Mg     2.30     12-03    TPro  7.7  /  Alb  x   /  TBili  x   /  DBili  x   /  AST  x   /  ALT  x   /  AlkPhos  x   12-03    PT/INR - ( 02 Dec 2023 07:41 )   PT: 13.3 sec;   INR: 1.18 ratio         PTT - ( 02 Dec 2023 07:41 )  PTT:27.3 sec  Urinalysis Basic - ( 03 Dec 2023 05:27 )    Color: x / Appearance: x / SG: x / pH: x  Gluc: 171 mg/dL / Ketone: x  / Bili: x / Urobili: x   Blood: x / Protein: x / Nitrite: x   Leuk Esterase: x / RBC: x / WBC x   Sq Epi: x / Non Sq Epi: x / Bacteria: x      CAPILLARY BLOOD GLUCOSE      POCT Blood Glucose.: 159 mg/dL (03 Dec 2023 12:11)      RADIOLOGY & ADDITIONAL TESTS: Reviewed.

## 2023-12-03 NOTE — PROVIDER CONTACT NOTE (CRITICAL VALUE NOTIFICATION) - NAME OF MD/NP/PA/DO NOTIFIED:
Shelby St Encountered pt resting OOB in mode-chair. Awoke to verbal stimulation by RN. NC + 5L. NAD. Pt with intermittent vocalizations. Responding via head gesturing for yes/no questions.

## 2023-12-03 NOTE — PHYSICAL THERAPY INITIAL EVALUATION ADULT - ACTIVE RANGE OF MOTION EXAMINATION, REHAB EVAL
bilateral ankles within functional limits, patient deferred all other mobility due to pain./bilateral upper extremity Active ROM was WFL (within functional limits)

## 2023-12-03 NOTE — PROGRESS NOTE ADULT - ASSESSMENT
63-year-old female with past medical history of DM, HTN, HLD, chronic bilateral lower extremity lymphedema presents today by EMS for lower extremity edema with malodorous discharge admitted with sepsis, r/o OM, LE cellulitis, UTI for which ID is consulted. Podiatry consulted, no plan for intervention. Pending MR L foot to evaluate for OM. WC consulted, recs pending. PT recs pending.

## 2023-12-04 LAB
GLUCOSE BLDC GLUCOMTR-MCNC: 119 MG/DL — HIGH (ref 70–99)
GLUCOSE BLDC GLUCOMTR-MCNC: 119 MG/DL — HIGH (ref 70–99)
GLUCOSE BLDC GLUCOMTR-MCNC: 182 MG/DL — HIGH (ref 70–99)
GLUCOSE BLDC GLUCOMTR-MCNC: 182 MG/DL — HIGH (ref 70–99)
GLUCOSE BLDC GLUCOMTR-MCNC: 183 MG/DL — HIGH (ref 70–99)
GLUCOSE BLDC GLUCOMTR-MCNC: 183 MG/DL — HIGH (ref 70–99)
GLUCOSE BLDC GLUCOMTR-MCNC: 215 MG/DL — HIGH (ref 70–99)
GLUCOSE BLDC GLUCOMTR-MCNC: 215 MG/DL — HIGH (ref 70–99)
VANCOMYCIN TROUGH SERPL-MCNC: 17 UG/ML — SIGNIFICANT CHANGE UP (ref 10–20)
VANCOMYCIN TROUGH SERPL-MCNC: 17 UG/ML — SIGNIFICANT CHANGE UP (ref 10–20)

## 2023-12-04 PROCEDURE — 76770 US EXAM ABDO BACK WALL COMP: CPT | Mod: 26

## 2023-12-04 PROCEDURE — 99232 SBSQ HOSP IP/OBS MODERATE 35: CPT

## 2023-12-04 RX ORDER — OXYCODONE HYDROCHLORIDE 5 MG/1
5 TABLET ORAL ONCE
Refills: 0 | Status: DISCONTINUED | OUTPATIENT
Start: 2023-12-04 | End: 2023-12-04

## 2023-12-04 RX ORDER — TRAMADOL HYDROCHLORIDE 50 MG/1
25 TABLET ORAL ONCE
Refills: 0 | Status: DISCONTINUED | OUTPATIENT
Start: 2023-12-04 | End: 2023-12-05

## 2023-12-04 RX ORDER — CHLORHEXIDINE GLUCONATE 213 G/1000ML
1 SOLUTION TOPICAL DAILY
Refills: 0 | Status: DISCONTINUED | OUTPATIENT
Start: 2023-12-04 | End: 2023-12-18

## 2023-12-04 RX ORDER — TRAMADOL HYDROCHLORIDE 50 MG/1
25 TABLET ORAL ONCE
Refills: 0 | Status: DISCONTINUED | OUTPATIENT
Start: 2023-12-04 | End: 2023-12-04

## 2023-12-04 RX ORDER — QUETIAPINE FUMARATE 200 MG/1
25 TABLET, FILM COATED ORAL AT BEDTIME
Refills: 0 | Status: DISCONTINUED | OUTPATIENT
Start: 2023-12-04 | End: 2023-12-06

## 2023-12-04 RX ADMIN — Medication 2000 UNIT(S): at 12:45

## 2023-12-04 RX ADMIN — Medication 1: at 12:45

## 2023-12-04 RX ADMIN — TRAMADOL HYDROCHLORIDE 25 MILLIGRAM(S): 50 TABLET ORAL at 02:03

## 2023-12-04 RX ADMIN — OXYCODONE HYDROCHLORIDE 5 MILLIGRAM(S): 5 TABLET ORAL at 12:49

## 2023-12-04 RX ADMIN — HEPARIN SODIUM 5000 UNIT(S): 5000 INJECTION INTRAVENOUS; SUBCUTANEOUS at 05:26

## 2023-12-04 RX ADMIN — TRAMADOL HYDROCHLORIDE 25 MILLIGRAM(S): 50 TABLET ORAL at 01:32

## 2023-12-04 RX ADMIN — Medication 1 TABLET(S): at 12:45

## 2023-12-04 RX ADMIN — CEFEPIME 100 MILLIGRAM(S): 1 INJECTION, POWDER, FOR SOLUTION INTRAMUSCULAR; INTRAVENOUS at 21:32

## 2023-12-04 RX ADMIN — OXYCODONE HYDROCHLORIDE 5 MILLIGRAM(S): 5 TABLET ORAL at 13:49

## 2023-12-04 RX ADMIN — HEPARIN SODIUM 5000 UNIT(S): 5000 INJECTION INTRAVENOUS; SUBCUTANEOUS at 21:38

## 2023-12-04 RX ADMIN — MUPIROCIN 1 APPLICATION(S): 20 OINTMENT TOPICAL at 17:58

## 2023-12-04 RX ADMIN — ATORVASTATIN CALCIUM 40 MILLIGRAM(S): 80 TABLET, FILM COATED ORAL at 21:33

## 2023-12-04 RX ADMIN — Medication 1: at 08:36

## 2023-12-04 NOTE — BH CONSULTATION LIAISON ASSESSMENT NOTE - NSBHATTESTTYPEVISIT_PSY_A_CORE
Attending evaluating patient with ADELSO ([id number] code) Attending evaluating patient with ADELSO ([id number] code)

## 2023-12-04 NOTE — PROGRESS NOTE ADULT - PROBLEM SELECTOR PLAN 1
-pt p/w leukocytosis, tachy with multiple potential sources of infection -skin/soft tissue, OM, UTI, PNA  -treat empirically with vanc (by level) and cefepime -- ID in agreement, f/w recommendations   -f/u MRI of left foot to r/o OM of 5th digit   f/u blood and urine cultures  -f/u MRSA swab  - reconsult podiatry if MRI findings c/f OM  - ID consulted, appreciate recs

## 2023-12-04 NOTE — BH CONSULTATION LIAISON ASSESSMENT NOTE - NSBHCHARTREVIEWLAB_PSY_A_CORE FT
7.8    11.29 )-----------( 400      ( 03 Dec 2023 05:27 )             23.2   12-03    136  |  104  |  83<H>  ----------------------------<  171<H>  3.5   |  17<L>  |  3.72<H>    Ca    7.7<L>      03 Dec 2023 05:27  Phos  5.1     12-03  Mg     2.30     12-03    TPro  7.7  /  Alb  x   /  TBili  x   /  DBili  x   /  AST  x   /  ALT  x   /  AlkPhos  x   12-03

## 2023-12-04 NOTE — BH CONSULTATION LIAISON ASSESSMENT NOTE - NSBHCHARTREVIEWVS_PSY_A_CORE FT
Vital Signs Last 24 Hrs  T(C): 36.6 (04 Dec 2023 12:33), Max: 36.9 (03 Dec 2023 21:04)  T(F): 97.9 (04 Dec 2023 12:33), Max: 98.4 (03 Dec 2023 21:04)  HR: 84 (04 Dec 2023 12:33) (84 - 89)  BP: 104/55 (04 Dec 2023 12:33) (102/69 - 128/55)  BP(mean): --  RR: 18 (04 Dec 2023 12:33) (18 - 18)  SpO2: 100% (04 Dec 2023 12:33) (99% - 100%)    Parameters below as of 04 Dec 2023 05:25  Patient On (Oxygen Delivery Method): room air

## 2023-12-04 NOTE — BH CONSULTATION LIAISON ASSESSMENT NOTE - HPI (INCLUDE ILLNESS QUALITY, SEVERITY, DURATION, TIMING, CONTEXT, MODIFYING FACTORS, ASSOCIATED SIGNS AND SYMPTOMS)
Patient is a 63F with hx of chronic lymphedema, T2DM, HTN, HLD. comes to [facility] for chronic wheeping lymphodema and left foot bullae. Patient last seen at [facility] in november 2023, DC to rehab, left rehab as she felt others were "mean" due to her leg odor, now homeless.  Tried to stay in a hotel and ambulance was called bringing her to [facility].  currently not working, getting social security, denies being on disability. Has an adult daughter whom she has a mother daughter home with, as well as patient grandson who is 6. Did stay with them for a few days recently,  however had disagreements with daughter and left. Patient denies any past psychiatric hx. Did report seeing a therapist when her parents  when she was a child. no hx of SA. No substance abuse reported.     patient was seen and assessed at bedside. Patient is alert, oriented, calm, cooperative and pleasant on exam. Patient able to tell story of rehab, leaving rehab, staying in her home, and being homeless. She is linear, no thought disorganization at this time. Patient states the police were called to her rehab as she had disagreements with staff. She also reports disagreements with daughter. Feels her daughter wants a relationship with her only for her money. States she is trying to get power of  over her to take her money. Patient denies others are trying to harm her or steal from her. Denies SI or HI. Denies AH and VH. Feels mood is "sad" right now, but does not want medication. We discussed options of medications and benefits. Patient is willing to accept medication as needed for sleep at his time.  Patient is a 63F with hx of chronic lymphedema, T2DM, HTN, HLD. comes to [facility] for chronic wheeping lymphodema and left foot bullae. Patient last seen at [facility] in november 2023, DC to rehab, left rehab as she felt others were "mean" due to her leg odor, now homeless.  Tried to stay in a hotel and ambulance was called bringing her to [facility].  currently not working, getting social security, denies being on disability. Has an adult daughter whom she has a mother daughter home with, as well as patient grandson who is 6. Did stay with them for a few days recently,  however had disagreements with daughter and left. Patient denies any past psychiatric hx. Did report seeing a therapist when her parents  when she was a child. no hx of SA. No substance abuse reported.     patient was seen and assessed at bedside. Patient is alert, oriented, calm, cooperative and pleasant on exam. Patient able to tell story of rehab, leaving rehab, staying in her home, and being homeless. She is linear, no thought disorganization at this time. Patient states the police were called to her rehab as she had disagreements with staff. She also reports disagreements with daughter. Feels her daughter wants a relationship with her only for her money. States she is trying to get power of  over her to take her money. Patient denies others are trying to harm her or steal from her. Denies SI or HI. Denies AH and VH. Feels mood is "sad" right now, but does not want medication. We discussed options of medications and benefits. Patient is willing to accept medication as needed for sleep at his time.

## 2023-12-04 NOTE — BH CONSULTATION LIAISON ASSESSMENT NOTE - SUMMARY
Patient is a 63F with hx of chronic lymphedema, T2DM, HTN, HLD. comes to [facility] for chronic wheeping lymphodema and left foot bullae. Patient last seen at [facility] in november 2023, DC to rehab, left rehab as she felt others were "mean" due to her leg odor, now homeless.  Tried to stay in a hotel and ambulance was called bringing her to [facility].  currently not working, getting social security, denies being on disability. Has an adult daughter whom she has a mother daughter home with, as well as patient grandson who is 6. Did stay with them for a few days recently,  however had disagreements with daughter and left. Patient denies any past psychiatric hx. Did report seeing a therapist when her parents  when she was a child. no hx of SA. No substance abuse reported.       PLAN  - observation deferred to primary team  - patient offered psychiatric medication - declined - no Si or Hi, allowing for care  - is agreeable for medication for insomnia  -- PRN Seroquel 25mg qhs  - EKG  - antipsychotics can only be given if qtc < 500   - PRN for mild agitation: Seroquel 25mg q6hrs. PRN for severe agitation Zyprexa 2.5mg q6hrs   - patient reports no dispo plan discussed with her - CL will follow   Patient is a 63F with hx of chronic lymphedema, T2DM, HTN, HLD. comes to [facility] for chronic wheeping lymphodema and left foot bullae. Patient last seen at [facility] in november 2023, DC to rehab, left rehab as she felt others were "mean" due to her leg odor, now homeless.  Tried to stay in a hotel and ambulance was called bringing her to [facility].  currently not working, getting social security, denies being on disability. Has an adult daughter whom she has a mother daughter home with, as well as patient grandson who is 6. Did stay with them for a few days recently,  however had disagreements with daughter and left. Patient denies any past psychiatric hx. Did report seeing a therapist when her parents  when she was a child. no hx of SA. No substance abuse reported.       PLAN  - observation deferred to primary team  - patient offered psychiatric medication - declined - no Si or Hi, allowing for care  - is agreeable for medication for insomnia  -- PRN Seroquel 25mg qhs  - EKG  - antipsychotics can only be given if qtc < 500   - PRN for mild agitation: Seroquel 25mg q6hrs. PRN for severe agitation Zyprexa 2.5mg q6hrs   - patient reports no dispo plan discussed with her - CL will follow

## 2023-12-04 NOTE — BH CONSULTATION LIAISON ASSESSMENT NOTE - RISK ASSESSMENT
risk: hx of irritability, cannot rule out paranoia   protective: no SI or HI, no known psych hx or documentation, wants to live for her grandson, help seeking

## 2023-12-04 NOTE — PROGRESS NOTE ADULT - SUBJECTIVE AND OBJECTIVE BOX
Infectious Diseases Follow Up:    Patient is a 63y old  Female who presents with a chief complaint of le edema/pain (03 Dec 2023 17:00)      Interval History/ROS:  Pt c/o b/l LE pain     Allergies  No Known Allergies        ANTIMICROBIALS:  cefepime   IVPB    cefepime   IVPB 1000 every 24 hours      Current Abx:     Previous Abx     OTHER MEDS:  MEDICATIONS  (STANDING):  acetaminophen     Tablet .. 650 every 6 hours PRN  atorvastatin 40 at bedtime  dextrose 50% Injectable 25 once  dextrose 50% Injectable 12.5 once  dextrose 50% Injectable 25 once  dextrose Oral Gel 15 once PRN  dextrose Oral Gel 15 once PRN  glucagon  Injectable 1 once  glucagon  Injectable 1 once  heparin   Injectable 5000 every 8 hours  influenza   Vaccine 0.5 once  insulin lispro (ADMELOG) corrective regimen sliding scale  at bedtime  insulin lispro (ADMELOG) corrective regimen sliding scale  three times a day before meals  melatonin 3 at bedtime PRN      Vital Signs Last 24 Hrs  T(C): 36.7 (04 Dec 2023 05:25), Max: 36.9 (03 Dec 2023 21:04)  T(F): 98.1 (04 Dec 2023 05:25), Max: 98.4 (03 Dec 2023 21:04)  HR: 86 (04 Dec 2023 05:25) (85 - 89)  BP: 128/55 (04 Dec 2023 05:25) (102/69 - 128/55)  BP(mean): --  RR: 18 (04 Dec 2023 05:25) (18 - 18)  SpO2: 99% (04 Dec 2023 05:25) (99% - 100%)    Parameters below as of 04 Dec 2023 05:25  Patient On (Oxygen Delivery Method): room air        PHYSICAL EXAM:  GENERAL: NAD, well-developed  HEAD:  Atraumatic, Normocephalic  EYES: EOMI, PERRLA, conjunctiva and sclera clear  CHEST/LUNG: Clear to auscultation bilaterally; No wheeze  HEART: Regular rate and rhythm; No murmurs, rubs, or gallops  ABDOMEN: Soft, Nontender, Nondistended; Bowel sounds present  EXTREMITIES:  B/l LE wrapped   PSYCH: AAOx3                          7.8    11.29 )-----------( 400      ( 03 Dec 2023 05:27 )             23.2       12-03    136  |  104  |  83<H>  ----------------------------<  171<H>  3.5   |  17<L>  |  3.72<H>    Ca    7.7<L>      03 Dec 2023 05:27  Phos  5.1     12-03  Mg     2.30     12-03    TPro  7.7  /  Alb  x   /  TBili  x   /  DBili  x   /  AST  x   /  ALT  x   /  AlkPhos  x   12-03      Urinalysis Basic - ( 03 Dec 2023 05:27 )    Color: x / Appearance: x / SG: x / pH: x  Gluc: 171 mg/dL / Ketone: x  / Bili: x / Urobili: x   Blood: x / Protein: x / Nitrite: x   Leuk Esterase: x / RBC: x / WBC x   Sq Epi: x / Non Sq Epi: x / Bacteria: x        MICROBIOLOGY:  Vancomycin Level, Trough: 17.0 ug/mL (12-04-23 @ 05:23)  Vancomycin Level, Random: 7.8 ug/mL (12-03-23 @ 20:02)  v  Clean Catch Clean Catch (Midstream)  12-02-23   >100,000 CFU/ml Escherichia coli  --  --                RADIOLOGY:

## 2023-12-04 NOTE — BH CONSULTATION LIAISON ASSESSMENT NOTE - CURRENT MEDICATION
MEDICATIONS  (STANDING):  atorvastatin 40 milliGRAM(s) Oral at bedtime  cefepime   IVPB      cefepime   IVPB 1000 milliGRAM(s) IV Intermittent every 24 hours  chlorhexidine 2% Cloths 1 Application(s) Topical daily  cholecalciferol 2000 Unit(s) Oral daily  dextrose 50% Injectable 25 Gram(s) IV Push once  dextrose 50% Injectable 25 Gram(s) IV Push once  dextrose 50% Injectable 12.5 Gram(s) IV Push once  glucagon  Injectable 1 milliGRAM(s) IntraMuscular once  glucagon  Injectable 1 milliGRAM(s) IntraMuscular once  heparin   Injectable 5000 Unit(s) SubCutaneous every 8 hours  influenza   Vaccine 0.5 milliLiter(s) IntraMuscular once  insulin lispro (ADMELOG) corrective regimen sliding scale   SubCutaneous three times a day before meals  insulin lispro (ADMELOG) corrective regimen sliding scale   SubCutaneous at bedtime  multivitamin 1 Tablet(s) Oral daily  mupirocin 2% Ointment 1 Application(s) Topical two times a day    MEDICATIONS  (PRN):  acetaminophen     Tablet .. 650 milliGRAM(s) Oral every 6 hours PRN Temp greater or equal to 38C (100.4F), Mild Pain (1 - 3)  dextrose Oral Gel 15 Gram(s) Oral once PRN Blood Glucose LESS THAN 70 milliGRAM(s)/deciliter  dextrose Oral Gel 15 Gram(s) Oral once PRN Blood Glucose LESS THAN 70 milliGRAM(s)/deciliter  melatonin 3 milliGRAM(s) Oral at bedtime PRN Insomnia

## 2023-12-04 NOTE — PROGRESS NOTE ADULT - ASSESSMENT
This is a 64 y/o F w/ PMHx of DM, HTN, HLD, chronic b/l lymphedema who presents to [facility] on 12/2 for LE wounds with malodorous discharge that has been worsening. Also w/ dysuria.  Pt was briefly lethargic/hypotensive, started on BiPAP but quickly discontinued. Pt was afebrile, WBC 14.8.   Seen by podiatry for L foot bullae, no findings of active infection. L foot XR 5th MT head possible erosion. U/A with 84 WBCs.   ID now consulted for leukocytosis in the setting of LE drainage, positive U/A and L foot XR w/ 5th MT erosion     #LE lymphedema, with malodorous discharge  #Sepsis  #UTI  #L fifth metatarsal lucency seen on XR, c/f OM    Overall leukocytosis possible to SSTI, OM, UTI. Would continue with empiric coverage with Vancomycin/Cefepime, f/u UCx. Obtain imaging of LLE.     Plan:   1. C/w Vancomycin by level, level 17 today, check AM trough, dose 1 gram if < 15  2. C/w Cefepime 1 g q24  3. Wound care c/s, please have them message me via MT when dressing wound   4. Would obtain further imaging of L leg, can extend MRI to include tib/fib    Thank you for this consult. Inpatient ID team will follow.    Jose Alberto Tomas M.D.  Attending Physician  Division of Infectious Diseases  Department of Medicine    Please contact through MS Teams message.  Office: [phone] (after 5 PM or weekend) This is a 64 y/o F w/ PMHx of DM, HTN, HLD, chronic b/l lymphedema who presents to [facility] on 12/2 for LE wounds with malodorous discharge that has been worsening. Also w/ dysuria.  Pt was briefly lethargic/hypotensive, started on BiPAP but quickly discontinued. Pt was afebrile, WBC 14.8.   Seen by podiatry for L foot bullae, no findings of active infection. L foot XR 5th MT head possible erosion. U/A with 84 WBCs.   ID now consulted for leukocytosis in the setting of LE drainage, positive U/A and L foot XR w/ 5th MT erosion     #LE lymphedema, with malodorous discharge  #Sepsis  #UTI  #L fifth metatarsal lucency seen on XR, c/f OM    Overall leukocytosis possible to SSTI, OM, UTI. Would continue with empiric coverage with Vancomycin/Cefepime, f/u UCx. Obtain imaging of LLE.     Plan:   1. C/w Vancomycin by level, level 17 today, check AM trough, dose 1 gram if < 15  2. C/w Cefepime 1 g q24  3. Wound care c/s, please have them message me via MT when dressing wound   4. Would obtain further imaging of L leg, can extend MRI to include tib/fib    Thank you for this consult. Inpatient ID team will follow.    Jose Alberto Tomas M.D.  Attending Physician  Division of Infectious Diseases  Department of Medicine    Please contact through MS Teams message.  Office: [phone] (after 5 PM or weekend)

## 2023-12-04 NOTE — PROGRESS NOTE ADULT - SUBJECTIVE AND OBJECTIVE BOX
Medicine Progress Note    Patient is a 63y old  Female who presents with a chief complaint of le edema/pain (04 Dec 2023 12:01)      SUBJECTIVE / OVERNIGHT EVENTS: both LE wrapped , full odor emanating from the wounds         MEDICATIONS  (STANDING):  atorvastatin 40 milliGRAM(s) Oral at bedtime  cefepime   IVPB      cefepime   IVPB 1000 milliGRAM(s) IV Intermittent every 24 hours  cholecalciferol 2000 Unit(s) Oral daily  dextrose 50% Injectable 25 Gram(s) IV Push once  dextrose 50% Injectable 25 Gram(s) IV Push once  dextrose 50% Injectable 12.5 Gram(s) IV Push once  glucagon  Injectable 1 milliGRAM(s) IntraMuscular once  glucagon  Injectable 1 milliGRAM(s) IntraMuscular once  heparin   Injectable 5000 Unit(s) SubCutaneous every 8 hours  influenza   Vaccine 0.5 milliLiter(s) IntraMuscular once  insulin lispro (ADMELOG) corrective regimen sliding scale   SubCutaneous three times a day before meals  insulin lispro (ADMELOG) corrective regimen sliding scale   SubCutaneous at bedtime  multivitamin 1 Tablet(s) Oral daily  mupirocin 2% Ointment 1 Application(s) Topical two times a day    MEDICATIONS  (PRN):  acetaminophen     Tablet .. 650 milliGRAM(s) Oral every 6 hours PRN Temp greater or equal to 38C (100.4F), Mild Pain (1 - 3)  dextrose Oral Gel 15 Gram(s) Oral once PRN Blood Glucose LESS THAN 70 milliGRAM(s)/deciliter  dextrose Oral Gel 15 Gram(s) Oral once PRN Blood Glucose LESS THAN 70 milliGRAM(s)/deciliter  melatonin 3 milliGRAM(s) Oral at bedtime PRN Insomnia    CAPILLARY BLOOD GLUCOSE      POCT Blood Glucose.: 183 mg/dL (04 Dec 2023 12:11)  POCT Blood Glucose.: 182 mg/dL (04 Dec 2023 08:28)  POCT Blood Glucose.: 215 mg/dL (03 Dec 2023 17:19)    I&O's Summary    03 Dec 2023 07:01  -  04 Dec 2023 07:00  --------------------------------------------------------  IN: 0 mL / OUT: 2300 mL / NET: -2300 mL        PHYSICAL EXAM:  Vital Signs Last 24 Hrs  T(C): 36.7 (04 Dec 2023 05:25), Max: 36.9 (03 Dec 2023 21:04)  T(F): 98.1 (04 Dec 2023 05:25), Max: 98.4 (03 Dec 2023 21:04)  HR: 86 (04 Dec 2023 05:25) (85 - 89)  BP: 128/55 (04 Dec 2023 05:25) (102/69 - 128/55)  BP(mean): --  RR: 18 (04 Dec 2023 05:25) (18 - 18)  SpO2: 99% (04 Dec 2023 05:25) (99% - 100%)    Parameters below as of 04 Dec 2023 05:25  Patient On (Oxygen Delivery Method): room air      CONSTITUTIONAL: NAD   ENMT: Moist oral mucosa, no pharyngeal injection or exudates;   RESPIRATORY: Normal respiratory effort; lungs are clear to auscultation bilaterally  CARDIOVASCULAR: Regular rate and rhythm, normal S1 and S2, B/L lower extremity edema; dressing . wounds   ABDOMEN: Nontender to palpation, normoactive bowel sounds, no rebound/guarding;   PSYCH: A+O to person, place, and time; affect appropriate  NEUROLOGY: CN 2-12 are intact and symmetric; no gross sensory deficits   SKIN: as above     LABS:                        7.8    11.29 )-----------( 400      ( 03 Dec 2023 05:27 )             23.2     12-03    136  |  104  |  83<H>  ----------------------------<  171<H>  3.5   |  17<L>  |  3.72<H>    Ca    7.7<L>      03 Dec 2023 05:27  Phos  5.1     12-03  Mg     2.30     12-03    TPro  7.7  /  Alb  x   /  TBili  x   /  DBili  x   /  AST  x   /  ALT  x   /  AlkPhos  x   12-03          Urinalysis Basic - ( 03 Dec 2023 05:27 )    Color: x / Appearance: x / SG: x / pH: x  Gluc: 171 mg/dL / Ketone: x  / Bili: x / Urobili: x   Blood: x / Protein: x / Nitrite: x   Leuk Esterase: x / RBC: x / WBC x   Sq Epi: x / Non Sq Epi: x / Bacteria: x        Culture - Urine (collected 02 Dec 2023 13:40)  Source: Clean Catch Clean Catch (Midstream)  Preliminary Report (04 Dec 2023 07:53):    >100,000 CFU/ml Escherichia coli      SARS-CoV-2: Detected (11 Nov 2023 06:12)  COVID-19 PCR: Detected (10 Nov 2023 10:27)      RADIOLOGY & ADDITIONAL TESTS:  Imaging from Last 24 Hours:    Electrocardiogram/QTc Interval:    COORDINATION OF CARE:  Care Discussed with Consultants/Other Providers: JASWINDER , RN

## 2023-12-04 NOTE — BH CONSULTATION LIAISON ASSESSMENT NOTE - NSBHATTESTBILLING_PSY_A_CORE
[id number] diagnostic evaluation with medical services [id number] diagnostic evaluation with medical services

## 2023-12-05 LAB
-  AMOXICILLIN/CLAVULANIC ACID: SIGNIFICANT CHANGE UP
-  AMOXICILLIN/CLAVULANIC ACID: SIGNIFICANT CHANGE UP
-  AMPICILLIN/SULBACTAM: SIGNIFICANT CHANGE UP
-  AMPICILLIN/SULBACTAM: SIGNIFICANT CHANGE UP
-  AMPICILLIN: SIGNIFICANT CHANGE UP
-  AMPICILLIN: SIGNIFICANT CHANGE UP
-  AZTREONAM: SIGNIFICANT CHANGE UP
-  AZTREONAM: SIGNIFICANT CHANGE UP
-  CEFAZOLIN: SIGNIFICANT CHANGE UP
-  CEFAZOLIN: SIGNIFICANT CHANGE UP
-  CEFEPIME: SIGNIFICANT CHANGE UP
-  CEFEPIME: SIGNIFICANT CHANGE UP
-  CEFOXITIN: SIGNIFICANT CHANGE UP
-  CEFOXITIN: SIGNIFICANT CHANGE UP
-  CEFTRIAXONE: SIGNIFICANT CHANGE UP
-  CEFTRIAXONE: SIGNIFICANT CHANGE UP
-  CEFUROXIME: SIGNIFICANT CHANGE UP
-  CEFUROXIME: SIGNIFICANT CHANGE UP
-  CIPROFLOXACIN: SIGNIFICANT CHANGE UP
-  CIPROFLOXACIN: SIGNIFICANT CHANGE UP
-  ERTAPENEM: SIGNIFICANT CHANGE UP
-  ERTAPENEM: SIGNIFICANT CHANGE UP
-  GENTAMICIN: SIGNIFICANT CHANGE UP
-  GENTAMICIN: SIGNIFICANT CHANGE UP
-  IMIPENEM: SIGNIFICANT CHANGE UP
-  IMIPENEM: SIGNIFICANT CHANGE UP
-  LEVOFLOXACIN: SIGNIFICANT CHANGE UP
-  LEVOFLOXACIN: SIGNIFICANT CHANGE UP
-  MEROPENEM: SIGNIFICANT CHANGE UP
-  MEROPENEM: SIGNIFICANT CHANGE UP
-  NITROFURANTOIN: SIGNIFICANT CHANGE UP
-  NITROFURANTOIN: SIGNIFICANT CHANGE UP
-  PIPERACILLIN/TAZOBACTAM: SIGNIFICANT CHANGE UP
-  PIPERACILLIN/TAZOBACTAM: SIGNIFICANT CHANGE UP
-  TOBRAMYCIN: SIGNIFICANT CHANGE UP
-  TOBRAMYCIN: SIGNIFICANT CHANGE UP
-  TRIMETHOPRIM/SULFAMETHOXAZOLE: SIGNIFICANT CHANGE UP
-  TRIMETHOPRIM/SULFAMETHOXAZOLE: SIGNIFICANT CHANGE UP
ALBUMIN SERPL ELPH-MCNC: 2.4 G/DL — LOW (ref 3.3–5)
ALBUMIN SERPL ELPH-MCNC: 2.4 G/DL — LOW (ref 3.3–5)
ALP SERPL-CCNC: 93 U/L — SIGNIFICANT CHANGE UP (ref 40–120)
ALP SERPL-CCNC: 93 U/L — SIGNIFICANT CHANGE UP (ref 40–120)
ALT FLD-CCNC: 18 U/L — SIGNIFICANT CHANGE UP (ref 4–33)
ALT FLD-CCNC: 18 U/L — SIGNIFICANT CHANGE UP (ref 4–33)
ANION GAP SERPL CALC-SCNC: 10 MMOL/L — SIGNIFICANT CHANGE UP (ref 7–14)
ANION GAP SERPL CALC-SCNC: 10 MMOL/L — SIGNIFICANT CHANGE UP (ref 7–14)
AST SERPL-CCNC: 13 U/L — SIGNIFICANT CHANGE UP (ref 4–32)
AST SERPL-CCNC: 13 U/L — SIGNIFICANT CHANGE UP (ref 4–32)
BASOPHILS # BLD AUTO: 0.04 K/UL — SIGNIFICANT CHANGE UP (ref 0–0.2)
BASOPHILS # BLD AUTO: 0.04 K/UL — SIGNIFICANT CHANGE UP (ref 0–0.2)
BASOPHILS NFR BLD AUTO: 0.4 % — SIGNIFICANT CHANGE UP (ref 0–2)
BASOPHILS NFR BLD AUTO: 0.4 % — SIGNIFICANT CHANGE UP (ref 0–2)
BILIRUB SERPL-MCNC: <0.2 MG/DL — SIGNIFICANT CHANGE UP (ref 0.2–1.2)
BILIRUB SERPL-MCNC: <0.2 MG/DL — SIGNIFICANT CHANGE UP (ref 0.2–1.2)
BUN SERPL-MCNC: 48 MG/DL — HIGH (ref 7–23)
BUN SERPL-MCNC: 48 MG/DL — HIGH (ref 7–23)
CALCIUM SERPL-MCNC: 8.7 MG/DL — SIGNIFICANT CHANGE UP (ref 8.4–10.5)
CALCIUM SERPL-MCNC: 8.7 MG/DL — SIGNIFICANT CHANGE UP (ref 8.4–10.5)
CHLORIDE SERPL-SCNC: 107 MMOL/L — SIGNIFICANT CHANGE UP (ref 98–107)
CHLORIDE SERPL-SCNC: 107 MMOL/L — SIGNIFICANT CHANGE UP (ref 98–107)
CO2 SERPL-SCNC: 23 MMOL/L — SIGNIFICANT CHANGE UP (ref 22–31)
CO2 SERPL-SCNC: 23 MMOL/L — SIGNIFICANT CHANGE UP (ref 22–31)
CREAT SERPL-MCNC: 1.74 MG/DL — HIGH (ref 0.5–1.3)
CREAT SERPL-MCNC: 1.74 MG/DL — HIGH (ref 0.5–1.3)
CULTURE RESULTS: ABNORMAL
CULTURE RESULTS: ABNORMAL
EGFR: 33 ML/MIN/1.73M2 — LOW
EGFR: 33 ML/MIN/1.73M2 — LOW
EOSINOPHIL # BLD AUTO: 0.23 K/UL — SIGNIFICANT CHANGE UP (ref 0–0.5)
EOSINOPHIL # BLD AUTO: 0.23 K/UL — SIGNIFICANT CHANGE UP (ref 0–0.5)
EOSINOPHIL NFR BLD AUTO: 2.6 % — SIGNIFICANT CHANGE UP (ref 0–6)
EOSINOPHIL NFR BLD AUTO: 2.6 % — SIGNIFICANT CHANGE UP (ref 0–6)
GLUCOSE BLDC GLUCOMTR-MCNC: 160 MG/DL — HIGH (ref 70–99)
GLUCOSE BLDC GLUCOMTR-MCNC: 160 MG/DL — HIGH (ref 70–99)
GLUCOSE BLDC GLUCOMTR-MCNC: 169 MG/DL — HIGH (ref 70–99)
GLUCOSE BLDC GLUCOMTR-MCNC: 169 MG/DL — HIGH (ref 70–99)
GLUCOSE BLDC GLUCOMTR-MCNC: 189 MG/DL — HIGH (ref 70–99)
GLUCOSE BLDC GLUCOMTR-MCNC: 189 MG/DL — HIGH (ref 70–99)
GLUCOSE BLDC GLUCOMTR-MCNC: 226 MG/DL — HIGH (ref 70–99)
GLUCOSE BLDC GLUCOMTR-MCNC: 226 MG/DL — HIGH (ref 70–99)
GLUCOSE SERPL-MCNC: 171 MG/DL — HIGH (ref 70–99)
GLUCOSE SERPL-MCNC: 171 MG/DL — HIGH (ref 70–99)
HCT VFR BLD CALC: 24.8 % — LOW (ref 34.5–45)
HCT VFR BLD CALC: 24.8 % — LOW (ref 34.5–45)
HGB BLD-MCNC: 8.1 G/DL — LOW (ref 11.5–15.5)
HGB BLD-MCNC: 8.1 G/DL — LOW (ref 11.5–15.5)
IANC: 6.29 K/UL — SIGNIFICANT CHANGE UP (ref 1.8–7.4)
IANC: 6.29 K/UL — SIGNIFICANT CHANGE UP (ref 1.8–7.4)
IMM GRANULOCYTES NFR BLD AUTO: 1.5 % — HIGH (ref 0–0.9)
IMM GRANULOCYTES NFR BLD AUTO: 1.5 % — HIGH (ref 0–0.9)
LYMPHOCYTES # BLD AUTO: 1.69 K/UL — SIGNIFICANT CHANGE UP (ref 1–3.3)
LYMPHOCYTES # BLD AUTO: 1.69 K/UL — SIGNIFICANT CHANGE UP (ref 1–3.3)
LYMPHOCYTES # BLD AUTO: 18.9 % — SIGNIFICANT CHANGE UP (ref 13–44)
LYMPHOCYTES # BLD AUTO: 18.9 % — SIGNIFICANT CHANGE UP (ref 13–44)
MAGNESIUM SERPL-MCNC: 1.9 MG/DL — SIGNIFICANT CHANGE UP (ref 1.6–2.6)
MAGNESIUM SERPL-MCNC: 1.9 MG/DL — SIGNIFICANT CHANGE UP (ref 1.6–2.6)
MCHC RBC-ENTMCNC: 21.4 PG — LOW (ref 27–34)
MCHC RBC-ENTMCNC: 21.4 PG — LOW (ref 27–34)
MCHC RBC-ENTMCNC: 32.7 GM/DL — SIGNIFICANT CHANGE UP (ref 32–36)
MCHC RBC-ENTMCNC: 32.7 GM/DL — SIGNIFICANT CHANGE UP (ref 32–36)
MCV RBC AUTO: 65.6 FL — LOW (ref 80–100)
MCV RBC AUTO: 65.6 FL — LOW (ref 80–100)
METHOD TYPE: SIGNIFICANT CHANGE UP
METHOD TYPE: SIGNIFICANT CHANGE UP
MONOCYTES # BLD AUTO: 0.56 K/UL — SIGNIFICANT CHANGE UP (ref 0–0.9)
MONOCYTES # BLD AUTO: 0.56 K/UL — SIGNIFICANT CHANGE UP (ref 0–0.9)
MONOCYTES NFR BLD AUTO: 6.3 % — SIGNIFICANT CHANGE UP (ref 2–14)
MONOCYTES NFR BLD AUTO: 6.3 % — SIGNIFICANT CHANGE UP (ref 2–14)
NEUTROPHILS # BLD AUTO: 6.29 K/UL — SIGNIFICANT CHANGE UP (ref 1.8–7.4)
NEUTROPHILS # BLD AUTO: 6.29 K/UL — SIGNIFICANT CHANGE UP (ref 1.8–7.4)
NEUTROPHILS NFR BLD AUTO: 70.3 % — SIGNIFICANT CHANGE UP (ref 43–77)
NEUTROPHILS NFR BLD AUTO: 70.3 % — SIGNIFICANT CHANGE UP (ref 43–77)
NRBC # BLD: 0 /100 WBCS — SIGNIFICANT CHANGE UP (ref 0–0)
NRBC # BLD: 0 /100 WBCS — SIGNIFICANT CHANGE UP (ref 0–0)
NRBC # FLD: 0 K/UL — SIGNIFICANT CHANGE UP (ref 0–0)
NRBC # FLD: 0 K/UL — SIGNIFICANT CHANGE UP (ref 0–0)
ORGANISM # SPEC MICROSCOPIC CNT: ABNORMAL
PHOSPHATE SERPL-MCNC: 3.9 MG/DL — SIGNIFICANT CHANGE UP (ref 2.5–4.5)
PHOSPHATE SERPL-MCNC: 3.9 MG/DL — SIGNIFICANT CHANGE UP (ref 2.5–4.5)
PLATELET # BLD AUTO: 429 K/UL — HIGH (ref 150–400)
PLATELET # BLD AUTO: 429 K/UL — HIGH (ref 150–400)
POTASSIUM SERPL-MCNC: 3.6 MMOL/L — SIGNIFICANT CHANGE UP (ref 3.5–5.3)
POTASSIUM SERPL-MCNC: 3.6 MMOL/L — SIGNIFICANT CHANGE UP (ref 3.5–5.3)
POTASSIUM SERPL-SCNC: 3.6 MMOL/L — SIGNIFICANT CHANGE UP (ref 3.5–5.3)
POTASSIUM SERPL-SCNC: 3.6 MMOL/L — SIGNIFICANT CHANGE UP (ref 3.5–5.3)
PROT SERPL-MCNC: 8.1 G/DL — SIGNIFICANT CHANGE UP (ref 6–8.3)
PROT SERPL-MCNC: 8.1 G/DL — SIGNIFICANT CHANGE UP (ref 6–8.3)
RBC # BLD: 3.78 M/UL — LOW (ref 3.8–5.2)
RBC # BLD: 3.78 M/UL — LOW (ref 3.8–5.2)
RBC # FLD: 19.8 % — HIGH (ref 10.3–14.5)
RBC # FLD: 19.8 % — HIGH (ref 10.3–14.5)
SODIUM SERPL-SCNC: 140 MMOL/L — SIGNIFICANT CHANGE UP (ref 135–145)
SODIUM SERPL-SCNC: 140 MMOL/L — SIGNIFICANT CHANGE UP (ref 135–145)
SPECIMEN SOURCE: SIGNIFICANT CHANGE UP
SPECIMEN SOURCE: SIGNIFICANT CHANGE UP
VANCOMYCIN FLD-MCNC: 8.3 UG/ML — SIGNIFICANT CHANGE UP
VANCOMYCIN FLD-MCNC: 8.3 UG/ML — SIGNIFICANT CHANGE UP
WBC # BLD: 8.94 K/UL — SIGNIFICANT CHANGE UP (ref 3.8–10.5)
WBC # BLD: 8.94 K/UL — SIGNIFICANT CHANGE UP (ref 3.8–10.5)
WBC # FLD AUTO: 8.94 K/UL — SIGNIFICANT CHANGE UP (ref 3.8–10.5)
WBC # FLD AUTO: 8.94 K/UL — SIGNIFICANT CHANGE UP (ref 3.8–10.5)

## 2023-12-05 PROCEDURE — 99223 1ST HOSP IP/OBS HIGH 75: CPT

## 2023-12-05 PROCEDURE — 99232 SBSQ HOSP IP/OBS MODERATE 35: CPT

## 2023-12-05 PROCEDURE — 93010 ELECTROCARDIOGRAM REPORT: CPT

## 2023-12-05 PROCEDURE — 90792 PSYCH DIAG EVAL W/MED SRVCS: CPT

## 2023-12-05 PROCEDURE — 93970 EXTREMITY STUDY: CPT | Mod: 26

## 2023-12-05 RX ORDER — VANCOMYCIN HCL 1 G
1000 VIAL (EA) INTRAVENOUS ONCE
Refills: 0 | Status: COMPLETED | OUTPATIENT
Start: 2023-12-05 | End: 2023-12-05

## 2023-12-05 RX ORDER — TRAMADOL HYDROCHLORIDE 50 MG/1
25 TABLET ORAL EVERY 6 HOURS
Refills: 0 | Status: DISCONTINUED | OUTPATIENT
Start: 2023-12-05 | End: 2023-12-12

## 2023-12-05 RX ORDER — SENNA PLUS 8.6 MG/1
1 TABLET ORAL ONCE
Refills: 0 | Status: COMPLETED | OUTPATIENT
Start: 2023-12-05 | End: 2023-12-05

## 2023-12-05 RX ORDER — TRAMADOL HYDROCHLORIDE 50 MG/1
25 TABLET ORAL ONCE
Refills: 0 | Status: DISCONTINUED | OUTPATIENT
Start: 2023-12-05 | End: 2023-12-05

## 2023-12-05 RX ORDER — ACETAMINOPHEN 500 MG
1000 TABLET ORAL ONCE
Refills: 0 | Status: COMPLETED | OUTPATIENT
Start: 2023-12-05 | End: 2023-12-05

## 2023-12-05 RX ADMIN — CEFEPIME 100 MILLIGRAM(S): 1 INJECTION, POWDER, FOR SOLUTION INTRAMUSCULAR; INTRAVENOUS at 22:30

## 2023-12-05 RX ADMIN — Medication 400 MILLIGRAM(S): at 08:16

## 2023-12-05 RX ADMIN — HEPARIN SODIUM 5000 UNIT(S): 5000 INJECTION INTRAVENOUS; SUBCUTANEOUS at 08:16

## 2023-12-05 RX ADMIN — ATORVASTATIN CALCIUM 40 MILLIGRAM(S): 80 TABLET, FILM COATED ORAL at 22:07

## 2023-12-05 RX ADMIN — Medication 2000 UNIT(S): at 13:08

## 2023-12-05 RX ADMIN — Medication 1000 MILLIGRAM(S): at 08:46

## 2023-12-05 RX ADMIN — TRAMADOL HYDROCHLORIDE 25 MILLIGRAM(S): 50 TABLET ORAL at 22:06

## 2023-12-05 RX ADMIN — MUPIROCIN 1 APPLICATION(S): 20 OINTMENT TOPICAL at 08:16

## 2023-12-05 RX ADMIN — TRAMADOL HYDROCHLORIDE 25 MILLIGRAM(S): 50 TABLET ORAL at 15:34

## 2023-12-05 RX ADMIN — CHLORHEXIDINE GLUCONATE 1 APPLICATION(S): 213 SOLUTION TOPICAL at 17:56

## 2023-12-05 RX ADMIN — HEPARIN SODIUM 5000 UNIT(S): 5000 INJECTION INTRAVENOUS; SUBCUTANEOUS at 22:07

## 2023-12-05 RX ADMIN — TRAMADOL HYDROCHLORIDE 25 MILLIGRAM(S): 50 TABLET ORAL at 22:50

## 2023-12-05 RX ADMIN — MUPIROCIN 1 APPLICATION(S): 20 OINTMENT TOPICAL at 17:56

## 2023-12-05 RX ADMIN — SENNA PLUS 1 TABLET(S): 8.6 TABLET ORAL at 08:15

## 2023-12-05 RX ADMIN — TRAMADOL HYDROCHLORIDE 25 MILLIGRAM(S): 50 TABLET ORAL at 16:04

## 2023-12-05 RX ADMIN — Medication 250 MILLIGRAM(S): at 17:56

## 2023-12-05 RX ADMIN — Medication 1: at 17:55

## 2023-12-05 RX ADMIN — Medication 1: at 13:07

## 2023-12-05 RX ADMIN — Medication 1 TABLET(S): at 13:08

## 2023-12-05 NOTE — BH CONSULTATION LIAISON PROGRESS NOTE - NSBHATTESTCOMMENTATTENDFT_PSY_A_CORE
see addendum below for NP collateral  note above written by MD, patient seen by MD+NP. psych will follow for support, plan as above.

## 2023-12-05 NOTE — PROGRESS NOTE ADULT - SUBJECTIVE AND OBJECTIVE BOX
Infectious Diseases Follow Up:    Patient is a 63y old  Female who presents with a chief complaint of le edema/pain (04 Dec 2023 12:56)      Interval History/ROS:  No acute events, pt with persistent LE pain    Allergies  No Known Allergies        ANTIMICROBIALS:  cefepime   IVPB    cefepime   IVPB 1000 every 24 hours      Current Abx:     Previous Abx     OTHER MEDS:  MEDICATIONS  (STANDING):  acetaminophen     Tablet .. 650 every 6 hours PRN  atorvastatin 40 at bedtime  dextrose 50% Injectable 25 once  dextrose 50% Injectable 25 once  dextrose 50% Injectable 12.5 once  dextrose Oral Gel 15 once PRN  dextrose Oral Gel 15 once PRN  glucagon  Injectable 1 once  glucagon  Injectable 1 once  heparin   Injectable 5000 every 8 hours  influenza   Vaccine 0.5 once  insulin lispro (ADMELOG) corrective regimen sliding scale  at bedtime  insulin lispro (ADMELOG) corrective regimen sliding scale  three times a day before meals  melatonin 3 at bedtime PRN  QUEtiapine 25 at bedtime PRN      Vital Signs Last 24 Hrs  T(C): 36.7 (04 Dec 2023 21:09), Max: 36.7 (04 Dec 2023 21:09)  T(F): 98.1 (04 Dec 2023 21:09), Max: 98.1 (04 Dec 2023 21:09)  HR: 93 (04 Dec 2023 21:09) (84 - 93)  BP: 141/76 (04 Dec 2023 21:09) (104/55 - 141/76)  BP(mean): --  RR: 18 (04 Dec 2023 21:09) (18 - 18)  SpO2: 100% (04 Dec 2023 21:09) (100% - 100%)    Parameters below as of 04 Dec 2023 21:09  Patient On (Oxygen Delivery Method): room air        PHYSICAL EXAM:  GENERAL: NAD, well-developed  HEAD:  Atraumatic, Normocephalic  EYES: EOMI, PERRLA, conjunctiva and sclera clear  CHEST/LUNG: Clear to auscultation bilaterally; No wheeze  HEART: Regular rate and rhythm; No murmurs, rubs, or gallops  ABDOMEN: Soft, Nontender, Nondistended; Bowel sounds present  EXTREMITIES:  B/l LE wrapped, soaked dressing, images reviewed by nurse yesterday, b/l lymphedema, skin tears w/ bleeding, large ulcers, with granulation tissue, no overt purulence. Per nurse malodorous   PSYCH: AAOx3        MICROBIOLOGY:  v  .Blood Blood-Peripheral  12-03-23   No growth at 24 hours  --  --      .Blood Blood-Peripheral  12-03-23   No growth at 24 hours  --  --      Clean Catch Clean Catch (Midstream)  12-02-23   >100,000 CFU/ml Escherichia coli  --  --                RADIOLOGY:

## 2023-12-05 NOTE — BH CONSULTATION LIAISON PROGRESS NOTE - NSBHTIMEACTIVITIESPERFORMED_PSY_A_CORE
reviewed with prior NP, chart reviewed, spoke to pt reviewed with prior NP, chart reviewed, spoke to patient, NP spoke to family

## 2023-12-05 NOTE — PROGRESS NOTE ADULT - SUBJECTIVE AND OBJECTIVE BOX
Medicine Progress Note    Patient is a 63y old  Female who presents with a chief complaint of le edema/pain (05 Dec 2023 11:26)      SUBJECTIVE / OVERNIGHT EVENTS: wound care was done, ordered Doppler r/o DVT B/L LE   c/w pain b/l LE  MRI is pending     ADDITIONAL REVIEW OF SYSTEMS: negative     MEDICATIONS  (STANDING):  atorvastatin 40 milliGRAM(s) Oral at bedtime  cefepime   IVPB      cefepime   IVPB 1000 milliGRAM(s) IV Intermittent every 24 hours  chlorhexidine 2% Cloths 1 Application(s) Topical daily  cholecalciferol 2000 Unit(s) Oral daily  dextrose 50% Injectable 25 Gram(s) IV Push once  dextrose 50% Injectable 25 Gram(s) IV Push once  dextrose 50% Injectable 12.5 Gram(s) IV Push once  glucagon  Injectable 1 milliGRAM(s) IntraMuscular once  glucagon  Injectable 1 milliGRAM(s) IntraMuscular once  heparin   Injectable 5000 Unit(s) SubCutaneous every 8 hours  influenza   Vaccine 0.5 milliLiter(s) IntraMuscular once  insulin lispro (ADMELOG) corrective regimen sliding scale   SubCutaneous three times a day before meals  insulin lispro (ADMELOG) corrective regimen sliding scale   SubCutaneous at bedtime  multivitamin 1 Tablet(s) Oral daily  mupirocin 2% Ointment 1 Application(s) Topical two times a day    MEDICATIONS  (PRN):  acetaminophen     Tablet .. 650 milliGRAM(s) Oral every 6 hours PRN Temp greater or equal to 38C (100.4F), Mild Pain (1 - 3)  dextrose Oral Gel 15 Gram(s) Oral once PRN Blood Glucose LESS THAN 70 milliGRAM(s)/deciliter  dextrose Oral Gel 15 Gram(s) Oral once PRN Blood Glucose LESS THAN 70 milliGRAM(s)/deciliter  melatonin 3 milliGRAM(s) Oral at bedtime PRN Insomnia  QUEtiapine 25 milliGRAM(s) Oral at bedtime PRN insomnia  traMADol 25 milliGRAM(s) Oral every 6 hours PRN Moderate Pain (4 - 6)    CAPILLARY BLOOD GLUCOSE      POCT Blood Glucose.: 226 mg/dL (05 Dec 2023 11:45)  POCT Blood Glucose.: 215 mg/dL (04 Dec 2023 22:50)  POCT Blood Glucose.: 119 mg/dL (04 Dec 2023 17:39)    I&O's Summary    04 Dec 2023 07:01  -  05 Dec 2023 07:00  --------------------------------------------------------  IN: 0 mL / OUT: 1600 mL / NET: -1600 mL        PHYSICAL EXAM:  Vital Signs Last 24 Hrs  T(C): 36.7 (04 Dec 2023 21:09), Max: 36.7 (04 Dec 2023 21:09)  T(F): 98.1 (04 Dec 2023 21:09), Max: 98.1 (04 Dec 2023 21:09)  HR: 93 (04 Dec 2023 21:09) (93 - 93)  BP: 141/76 (04 Dec 2023 21:09) (141/76 - 141/76)  BP(mean): --  RR: 18 (04 Dec 2023 21:09) (18 - 18)  SpO2: 100% (04 Dec 2023 21:09) (100% - 100%)    Parameters below as of 04 Dec 2023 21:09  Patient On (Oxygen Delivery Method): room air      CONSTITUTIONAL: NAD,   ENMT: Moist oral mucosa, no pharyngeal injection or exudates;   RESPIRATORY: Normal respiratory effort; lungs are clear to auscultation bilaterally  CARDIOVASCULAR: Regular rate and rhythm, normal S1 and S2,  b/l elephantiasis , open areas on b/L LE, left toe wound   ABDOMEN: Nontender to palpation, normoactive bowel sounds, no rebound/guarding;   PSYCH: A+O to person, place, and time; affect appropriate  NEUROLOGY: CN 2-12 are intact and symmetric; no gross sensory deficits   SKIN: as above     LABS:                    Culture - Blood (collected 03 Dec 2023 20:30)  Source: .Blood Blood-Peripheral  Preliminary Report (05 Dec 2023 11:02):    No growth at 24 hours    Culture - Blood (collected 03 Dec 2023 20:00)  Source: .Blood Blood-Peripheral  Preliminary Report (05 Dec 2023 01:02):    No growth at 24 hours    Culture - Urine (collected 02 Dec 2023 13:40)  Source: Clean Catch Clean Catch (Midstream)  Preliminary Report (04 Dec 2023 07:53):    >100,000 CFU/ml Escherichia coli      SARS-CoV-2: Detected (11 Nov 2023 06:12)  COVID-19 PCR: Detected (10 Nov 2023 10:27)      RADIOLOGY & ADDITIONAL TESTS:  Imaging from Last 24 Hours:    Electrocardiogram/QTc Interval:    COORDINATION OF CARE:  Care Discussed with Consultants/Other Providers: ACP

## 2023-12-05 NOTE — PROGRESS NOTE ADULT - PROBLEM SELECTOR PLAN 1
-pt p/w leukocytosis, tachy with multiple potential sources of infection -skin/soft tissue, OM, UTI, PNA  -treat empirically with vanc (by level) and cefepime -- ID in agreement, f/w recommendations   -f/u MRI of left foot to r/o OM of 5th digit   f/u blood and urine cultures grew E coli  -f/u MRSA swab  - reconsult podiatry if MRI findings c/f OM  - ID consulted, appreciate recs  -f/w wound consult recs   Doppler b/l LE r/o DVT

## 2023-12-05 NOTE — BH CONSULTATION LIAISON PROGRESS NOTE - NSBHATTESTTYPEVISIT_PSY_A_CORE
Attending Only Attending evaluating patient with ADELSO ([id number] code) Attending evaluating patient with ADELSO ([id number] code)

## 2023-12-05 NOTE — CONSULT NOTE ADULT - NS ATTEND AMEND GEN_ALL_CORE FT
See above. Patient's nurse to provide basic hygiene.   Pure wick dysfunction related to stool incontinence. See above. Patient's nurse to provide basic hygiene.   Pure wick dysfunction related to stool incontinence.      Worsening lymphedema seems related to deteriorated renal function.      Advise venous duplex exam of legs, ASAP  Morbidity of obesity discussed.

## 2023-12-05 NOTE — BH CONSULTATION LIAISON PROGRESS NOTE - NSBHCONSULTFOLLOWAFTERCARE_PSY_A_CORE FT
Should f/u with [facility] psych [phone]    [facility]  [address] [place] [address], [address] Floor, [place] [address]  [phone]  [email or web address] Should f/u with [facility] psych [phone]    [facility]  [address] [place] [address], [address] Floor, [place] [address]  [phone]  [email or web address]

## 2023-12-05 NOTE — PROGRESS NOTE ADULT - ASSESSMENT
This is a 64 y/o F w/ PMHx of DM, HTN, HLD, chronic b/l lymphedema who presents to [facility] on 12/2 for LE wounds with malodorous discharge that has been worsening. Also w/ dysuria.  Pt was briefly lethargic/hypotensive, started on BiPAP but quickly discontinued. Pt was afebrile, WBC 14.8.   Seen by podiatry for L foot bullae, no findings of active infection. L foot XR 5th MT head possible erosion. U/A with 84 WBCs.   ID now consulted for leukocytosis in the setting of LE drainage, positive U/A and L foot XR w/ 5th MT erosion     #LE lymphedema, with malodorous discharge  #Sepsis  #UTI  #L fifth metatarsal lucency seen on XR, c/f OM    Overall leukocytosis possible to SSTI, OM, UTI. Would continue with empiric coverage with Vancomycin/Cefepime, f/u UCx. Obtain imaging of LLE.     Plan:   1. C/w Vancomycin by level, level 17 today, check AM trough, dose 1 gram if < 15  2. C/w Cefepime 1 g q24  3. Wound care c/s, please have them message me via MT when dressing wound   4. Would obtain further imaging of L leg, can extend MRI to include tib/fib    Thank you for this consult. Inpatient ID team will follow.  I will be away on 12/6, will return on 12/7  My colleague will be covering     Jose Alberto Tomas M.D.  Attending Physician  Division of Infectious Diseases  Department of Medicine    Please contact through MS Teams message.  Office: [phone] (after 5 PM or weekend) This is a 64 y/o F w/ PMHx of DM, HTN, HLD, chronic b/l lymphedema who presents to [facility] on 12/2 for LE wounds with malodorous discharge that has been worsening. Also w/ dysuria.  Pt was briefly lethargic/hypotensive, started on BiPAP but quickly discontinued. Pt was afebrile, WBC 14.8.   Seen by podiatry for L foot bullae, no findings of active infection. L foot XR 5th MT head possible erosion. U/A with 84 WBCs.   ID now consulted for leukocytosis in the setting of LE drainage, positive U/A and L foot XR w/ 5th MT erosion     #LE lymphedema, with malodorous discharge  #Sepsis  #UTI  #L fifth metatarsal lucency seen on XR, c/f OM    Overall leukocytosis possible to SSTI, OM, UTI. Would continue with empiric coverage with Vancomycin/Cefepime, f/u UCx. Obtain imaging of LLE.     Plan:   1. C/w Vancomycin by level, level 17 today, check AM trough, dose 1 gram if < 15  2. C/w Cefepime 1 g q24  3. Wound care c/s, please have them message me via MT when dressing wound   4. Would obtain further imaging of L leg, can extend MRI to include tib/fib    Thank you for this consult. Inpatient ID team will follow.  I will be away on 12/6, will return on 12/7  My colleague will be covering     Jose Alberto Tomas M.D.  Attending Physician  Division of Infectious Diseases  Department of Medicine    Please contact through MS Teams message.  Office: [phone] (after 5 PM or weekend)

## 2023-12-05 NOTE — PROGRESS NOTE ADULT - ASSESSMENT
63-year-old female with past medical history of DM, HTN, HLD, chronic bilateral lower extremity lymphedema presents by EMS for lower extremity edema with malodorous discharge admitted with sepsis, r/o OM, LE cellulitis, UTI for which ID is consulted. Podiatry consulted, no plan for intervention. Pending MR L foot to evaluate for OM. WC consulted, recs pending. PT recs pending.

## 2023-12-05 NOTE — CONSULT NOTE ADULT - SUBJECTIVE AND OBJECTIVE BOX
Wound SURGERY CONSULT NOTE    FROM:   FOR:   Reason for Consult:    HPI:  Pt irritable and only intermittently cooperative with questioning by examiner. Additional history obtained via chart review.     63-year-old female with past medical history of DM, HTN, HLD, chronic bilateral lower extremity lymphedema presents today by EMS for lower extremity edema with malodorous discharge. Patient states that the lower extremity has been getting progressively worse over the past month since last admission and with malodorous d/c. She also reports associated pain in LE. Patient notes that she has been having symptoms of dysuria, increased urinary frequency for 1 month with confirmed UTI requiring antibiotics.  States that she has not taken her antibiotics because she "lost her medication". States her daughter was not feeding her because she wanted money from her so she reports poor po intake.  Denies fever, chills, nausea, vomiting, chest pain, SOB, changes in bowel movement.  No history of heart failure or kidney disease. Reports generalized weakness, not able to walk 2/2 worsening LE edema.   Hospital course c/b an episode of lethargy and hypotension. Started on bipap briefly however pt did not tolerate and it was promptly discontinued with improvement in pt's mental status. (02 Dec 2023 22:18)      PAST MEDICAL & SURGICAL HISTORY:  Chronic Acquired Lymphedema      Adult-Onset Obesity      Iron Deficiency Anemia      DM (diabetes mellitus)      HTN (hypertension)      No significant past surgical history          REVIEW OF SYSTEMS      General: non ambulatory	    Skin/Breast:  	  Ophthalmologic:  	  ENMT:	    Respiratory and Thorax:  	  Cardiovascular:	    Gastrointestinal:	  incontinent of stool    Genitourinary:	incontinent    Musculoskeletal:	    Neurological:	    Psychiatric:	    Hematology/Lymphatics:	    Endocrine:    reports that glucose is poorly controlled	    Allergic/Immunologic:	  Skin-  weeping lymphedema of bilat legs and left foot    MEDICATIONS  (STANDING):  atorvastatin 40 milliGRAM(s) Oral at bedtime  cefepime   IVPB      cefepime   IVPB 1000 milliGRAM(s) IV Intermittent every 24 hours  chlorhexidine 2% Cloths 1 Application(s) Topical daily  cholecalciferol 2000 Unit(s) Oral daily  dextrose 50% Injectable 25 Gram(s) IV Push once  dextrose 50% Injectable 25 Gram(s) IV Push once  dextrose 50% Injectable 12.5 Gram(s) IV Push once  glucagon  Injectable 1 milliGRAM(s) IntraMuscular once  glucagon  Injectable 1 milliGRAM(s) IntraMuscular once  heparin   Injectable 5000 Unit(s) SubCutaneous every 8 hours  influenza   Vaccine 0.5 milliLiter(s) IntraMuscular once  insulin lispro (ADMELOG) corrective regimen sliding scale   SubCutaneous three times a day before meals  insulin lispro (ADMELOG) corrective regimen sliding scale   SubCutaneous at bedtime  multivitamin 1 Tablet(s) Oral daily  mupirocin 2% Ointment 1 Application(s) Topical two times a day    MEDICATIONS  (PRN):  acetaminophen     Tablet .. 650 milliGRAM(s) Oral every 6 hours PRN Temp greater or equal to 38C (100.4F), Mild Pain (1 - 3)  dextrose Oral Gel 15 Gram(s) Oral once PRN Blood Glucose LESS THAN 70 milliGRAM(s)/deciliter  dextrose Oral Gel 15 Gram(s) Oral once PRN Blood Glucose LESS THAN 70 milliGRAM(s)/deciliter  melatonin 3 milliGRAM(s) Oral at bedtime PRN Insomnia  QUEtiapine 25 milliGRAM(s) Oral at bedtime PRN insomnia  traMADol 25 milliGRAM(s) Oral every 6 hours PRN Moderate Pain (4 - 6)      Allergies    No Known Allergies    Intolerances        SOCIAL HISTORY:    FAMILY HISTORY:  No pertinent family history in first degree relatives        Vital Signs Last 24 Hrs  T(C): 36.7 (04 Dec 2023 21:09), Max: 36.7 (04 Dec 2023 21:09)  T(F): 98.1 (04 Dec 2023 21:09), Max: 98.1 (04 Dec 2023 21:09)  HR: 93 (04 Dec 2023 21:09) (84 - 93)  BP: 141/76 (04 Dec 2023 21:09) (104/55 - 141/76)  BP(mean): --  RR: 18 (04 Dec 2023 21:09) (18 - 18)  SpO2: 100% (04 Dec 2023 21:09) (100% - 100%)    Parameters below as of 04 Dec 2023 21:09  Patient On (Oxygen Delivery Method): room air        PHYSICAL EXAM:      Constitutional:  obese, bedridden, black female, NAD    Eyes: no jaundice    ENMT:    Neck: supple    Breasts: intertrigo undersurface of bilat breasts    Back:    Respiratory: no wheezing    Cardiovascular:    Gastrointestinal: incontinent of stool    Genitourinary:  incontinent despite pure wick    Rectal:    Extremities:  keratosis of bilateral lower legs, wounds bilateral lower legs with very wet dressings  Right anterior lower leg: 3 X 5 X .3 cm  Right anterior lateral: wound associated with expressed purulent fluid, without cellulitis: 2 X 2 X .2cm  Right posterior excoriation: 15 X 16 cm.    Left posterior lower leg excoriation : 24 X 14cm  Left medial lower leg: 5 X 7 X .2 cm    Mil of bilateral lower legs and left footsaturated with urine and lymphedematous fluid  See podiatry note    Dressings placed- Adaptic and Aquacel AG kept in place with bilateral mil- need to be changed at least 2 X daily    Vascular: Warm feet    Neurological: Intact    Skin: see above    Lymph Nodes: none palpable    Musculoskeletal:   at bedrest    Psychiatric:   not delusional        LABS:    Elevated creatinine            Albumin: 2.8 g/dL (12-02 @ 07:41)  Albumin: 2.4 g/dL (12-02 @ 06:55)  Albumin: 2.5 g/dL (12-02 @ 05:37)          Assessment/Dx:  Diabetes Mellitus with Renal failure and bilateral lymphedema of lower legs and left foot  Treatment-   see above     Wound SURGERY CONSULT NOTE    FROM:   FOR:   Reason for Consult:    HPI:  Pt irritable and only intermittently cooperative with questioning by examiner. Additional history obtained via chart review.     63-year-old female with past medical history of DM, HTN, HLD, chronic bilateral lower extremity lymphedema presents today by EMS for lower extremity edema with malodorous discharge. Patient states that the lower extremity has been getting progressively worse over the past month since last admission and with malodorous d/c. She also reports associated pain in LE. Patient notes that she has been having symptoms of dysuria, increased urinary frequency for 1 month with confirmed UTI requiring antibiotics.  States that she has not taken her antibiotics because she "lost her medication". States her daughter was not feeding her because she wanted money from her so she reports poor po intake.  Denies fever, chills, nausea, vomiting, chest pain, SOB, changes in bowel movement.  No history of heart failure or kidney disease. Reports generalized weakness, not able to walk 2/2 worsening LE edema.   Hospital course c/b an episode of lethargy and hypotension. Started on bipap briefly however pt did not tolerate and it was promptly discontinued with improvement in pt's mental status. (02 Dec 2023 22:18)      PAST MEDICAL & SURGICAL HISTORY:  Chronic Acquired Lymphedema      Adult-Onset Obesity      Iron Deficiency Anemia      DM (diabetes mellitus)      HTN (hypertension)      No significant past surgical history          REVIEW OF SYSTEMS      General: non ambulatory	    Skin/Breast:  	  Ophthalmologic:  	  ENMT:	    Respiratory and Thorax:  	  Cardiovascular:	    Gastrointestinal:	  incontinent of stool    Genitourinary:	incontinent    Musculoskeletal:	    Neurological:	    Psychiatric:	    Hematology/Lymphatics:	    Endocrine:    reports that glucose is poorly controlled	    Allergic/Immunologic:	  Skin-  weeping lymphedema of bilat legs and left foot    MEDICATIONS  (STANDING):  atorvastatin 40 milliGRAM(s) Oral at bedtime  cefepime   IVPB      cefepime   IVPB 1000 milliGRAM(s) IV Intermittent every 24 hours  chlorhexidine 2% Cloths 1 Application(s) Topical daily  cholecalciferol 2000 Unit(s) Oral daily  dextrose 50% Injectable 25 Gram(s) IV Push once  dextrose 50% Injectable 25 Gram(s) IV Push once  dextrose 50% Injectable 12.5 Gram(s) IV Push once  glucagon  Injectable 1 milliGRAM(s) IntraMuscular once  glucagon  Injectable 1 milliGRAM(s) IntraMuscular once  heparin   Injectable 5000 Unit(s) SubCutaneous every 8 hours  influenza   Vaccine 0.5 milliLiter(s) IntraMuscular once  insulin lispro (ADMELOG) corrective regimen sliding scale   SubCutaneous three times a day before meals  insulin lispro (ADMELOG) corrective regimen sliding scale   SubCutaneous at bedtime  multivitamin 1 Tablet(s) Oral daily  mupirocin 2% Ointment 1 Application(s) Topical two times a day    MEDICATIONS  (PRN):  acetaminophen     Tablet .. 650 milliGRAM(s) Oral every 6 hours PRN Temp greater or equal to 38C (100.4F), Mild Pain (1 - 3)  dextrose Oral Gel 15 Gram(s) Oral once PRN Blood Glucose LESS THAN 70 milliGRAM(s)/deciliter  dextrose Oral Gel 15 Gram(s) Oral once PRN Blood Glucose LESS THAN 70 milliGRAM(s)/deciliter  melatonin 3 milliGRAM(s) Oral at bedtime PRN Insomnia  QUEtiapine 25 milliGRAM(s) Oral at bedtime PRN insomnia  traMADol 25 milliGRAM(s) Oral every 6 hours PRN Moderate Pain (4 - 6)      Allergies    No Known Allergies    Intolerances        SOCIAL HISTORY:    FAMILY HISTORY:  No pertinent family history in first degree relatives        Vital Signs Last 24 Hrs  T(C): 36.7 (04 Dec 2023 21:09), Max: 36.7 (04 Dec 2023 21:09)  T(F): 98.1 (04 Dec 2023 21:09), Max: 98.1 (04 Dec 2023 21:09)  HR: 93 (04 Dec 2023 21:09) (84 - 93)  BP: 141/76 (04 Dec 2023 21:09) (104/55 - 141/76)  BP(mean): --  RR: 18 (04 Dec 2023 21:09) (18 - 18)  SpO2: 100% (04 Dec 2023 21:09) (100% - 100%)    Parameters below as of 04 Dec 2023 21:09  Patient On (Oxygen Delivery Method): room air        PHYSICAL EXAM:      Constitutional:  obese, bedridden, black female, NAD    Eyes: no jaundice    ENMT:    Neck: supple    Breasts: intertrigo undersurface of bilat breasts    Back:    Respiratory: no wheezing    Cardiovascular:    Gastrointestinal: incontinent of stool    Genitourinary:  incontinent despite pure wick    Rectal:    Extremities:  keratosis of bilateral lower legs, wounds bilateral lower legs with very wet dressings  Right anterior lower leg: 3 X 5 X .3 cm  Right anterior lateral: wound associated with expressed purulent fluid, without cellulitis: 2 X 2 X .2cm  Right posterior excoriation: 15 X 16 cm.    Left posterior lower leg excoriation : 24 X 14cm  Left medial lower leg: 5 X 7 X .2 cm    Mil of bilateral lower legs and left footsaturated with urine and lymphedematous fluid    Continue Abx., Cepimine and Vanco   See podiatry note    Dressings placed- Adaptic and Aquacel AG kept in place with bilateral mil- need to be changed at least 2 X daily    Vascular: Warm feet    Neurological: Intact    Skin: see above    Lymph Nodes: none palpable    Musculoskeletal:   at bedrest    Psychiatric:   not delusional        LABS:    Elevated creatinine            Albumin: 2.8 g/dL (12-02 @ 07:41)  Albumin: 2.4 g/dL (12-02 @ 06:55)  Albumin: 2.5 g/dL (12-02 @ 05:37)          Assessment/Dx:  Diabetes Mellitus with Renal failure and bilateral lymphedema of lower legs and left foot  Treatment-   see above     Wound SURGERY CONSULT NOTE    FROM:   FOR:   Reason for Consult:    HPI:  Pt irritable and only intermittently cooperative with questioning by examiner. Additional history obtained via chart review.     63-year-old female with past medical history of DM, HTN, HLD, chronic bilateral lower extremity lymphedema presents today by EMS for lower extremity edema with malodorous discharge. Patient states that the lower extremity has been getting progressively worse over the past month since last admission and with malodorous d/c. She also reports associated pain in LE. Patient notes that she has been having symptoms of dysuria, increased urinary frequency for 1 month with confirmed UTI requiring antibiotics.  States that she has not taken her antibiotics because she "lost her medication". States her daughter was not feeding her because she wanted money from her so she reports poor po intake.  Denies fever, chills, nausea, vomiting, chest pain, SOB, changes in bowel movement.  No history of heart failure or kidney disease. Reports generalized weakness, not able to walk 2/2 worsening LE edema.   Hospital course c/b an episode of lethargy and hypotension. Started on bipap briefly however pt did not tolerate and it was promptly discontinued with improvement in pt's mental status. (02 Dec 2023 22:18)      PAST MEDICAL & SURGICAL HISTORY:  Chronic Acquired Lymphedema      Adult-Onset Obesity      Iron Deficiency Anemia      DM (diabetes mellitus)      HTN (hypertension)      No significant past surgical history          REVIEW OF SYSTEMS      General: non ambulatory	    Skin/Breast:  	  Ophthalmologic:  	  ENMT:	    Respiratory and Thorax:  	  Cardiovascular:	    Gastrointestinal:	  incontinent of stool    Genitourinary:	incontinent    Musculoskeletal:	    Neurological:	    Psychiatric:	    Hematology/Lymphatics:	    Endocrine:    reports that glucose is poorly controlled	    Allergic/Immunologic:	  Skin-  weeping lymphedema of bilat legs and left foot    MEDICATIONS  (STANDING):  atorvastatin 40 milliGRAM(s) Oral at bedtime  cefepime   IVPB      cefepime   IVPB 1000 milliGRAM(s) IV Intermittent every 24 hours  chlorhexidine 2% Cloths 1 Application(s) Topical daily  cholecalciferol 2000 Unit(s) Oral daily  dextrose 50% Injectable 25 Gram(s) IV Push once  dextrose 50% Injectable 25 Gram(s) IV Push once  dextrose 50% Injectable 12.5 Gram(s) IV Push once  glucagon  Injectable 1 milliGRAM(s) IntraMuscular once  glucagon  Injectable 1 milliGRAM(s) IntraMuscular once  heparin   Injectable 5000 Unit(s) SubCutaneous every 8 hours  influenza   Vaccine 0.5 milliLiter(s) IntraMuscular once  insulin lispro (ADMELOG) corrective regimen sliding scale   SubCutaneous three times a day before meals  insulin lispro (ADMELOG) corrective regimen sliding scale   SubCutaneous at bedtime  multivitamin 1 Tablet(s) Oral daily  mupirocin 2% Ointment 1 Application(s) Topical two times a day    MEDICATIONS  (PRN):  acetaminophen     Tablet .. 650 milliGRAM(s) Oral every 6 hours PRN Temp greater or equal to 38C (100.4F), Mild Pain (1 - 3)  dextrose Oral Gel 15 Gram(s) Oral once PRN Blood Glucose LESS THAN 70 milliGRAM(s)/deciliter  dextrose Oral Gel 15 Gram(s) Oral once PRN Blood Glucose LESS THAN 70 milliGRAM(s)/deciliter  melatonin 3 milliGRAM(s) Oral at bedtime PRN Insomnia  QUEtiapine 25 milliGRAM(s) Oral at bedtime PRN insomnia  traMADol 25 milliGRAM(s) Oral every 6 hours PRN Moderate Pain (4 - 6)      Allergies    No Known Allergies    Intolerances        SOCIAL HISTORY:    FAMILY HISTORY:  No pertinent family history in first degree relatives        Vital Signs Last 24 Hrs  T(C): 36.7 (04 Dec 2023 21:09), Max: 36.7 (04 Dec 2023 21:09)  T(F): 98.1 (04 Dec 2023 21:09), Max: 98.1 (04 Dec 2023 21:09)  HR: 93 (04 Dec 2023 21:09) (84 - 93)  BP: 141/76 (04 Dec 2023 21:09) (104/55 - 141/76)  BP(mean): --  RR: 18 (04 Dec 2023 21:09) (18 - 18)  SpO2: 100% (04 Dec 2023 21:09) (100% - 100%)    Parameters below as of 04 Dec 2023 21:09  Patient On (Oxygen Delivery Method): room air        PHYSICAL EXAM:      Constitutional:  obese, bedridden, black female, NAD    Eyes: no jaundice    ENMT:    Neck: supple    Breasts: intertrigo undersurface of bilat breasts    Back:    Respiratory: no wheezing    Cardiovascular:    Gastrointestinal: incontinent of stool    Genitourinary:  incontinent despite pure wick    Rectal:    Extremities:  keratosis of bilateral lower legs, wounds bilateral lower legs with very wet dressings  Right anterior lower leg: 3 X 5 X .3 cm  Right anterior lateral: wound associated with expressed purulent fluid, without cellulitis: 2 X 2 X .2cm  Right posterior excoriation: 15 X 16 cm.    Left posterior lower leg excoriation : 24 X 14cm  Left medial lower leg: 5 X 7 X .2 cm    Mil of bilateral lower legs and left footsaturated with urine and lymphedematous fluid    Continue Abx., Cefepime and Vanco   See podiatry note    Dressings placed- Adaptic and Aquacel AG kept in place with bilateral mil- need to be changed at least 2 X daily    Vascular: Warm feet    Neurological: Intact    Skin: see above    Lymph Nodes: none palpable    Musculoskeletal:   at bedrest    Psychiatric:   not delusional        LABS:    Elevated creatinine            Albumin: 2.8 g/dL (12-02 @ 07:41)  Albumin: 2.4 g/dL (12-02 @ 06:55)  Albumin: 2.5 g/dL (12-02 @ 05:37)          Assessment/Dx:  Diabetes Mellitus with Renal failure and bilateral lymphedema of lower legs and left foot  Treatment-   see above     Wound SURGERY CONSULT NOTE    FROM:   FOR:   Reason for Consult:    HPI:  Pt irritable and only intermittently cooperative with questioning by examiner. Additional history obtained via chart review.     63-year-old female with past medical history of DM, HTN, HLD, chronic bilateral lower extremity lymphedema presents today by EMS for lower extremity edema with malodorous discharge. Patient states that the lower extremity has been getting progressively worse over the past month since last admission and with malodorous d/c. She also reports associated pain in LE. Patient notes that she has been having symptoms of dysuria, increased urinary frequency for 1 month with confirmed UTI requiring antibiotics.  States that she has not taken her antibiotics because she "lost her medication". States her daughter was not feeding her because she wanted money from her so she reports poor po intake.  Denies fever, chills, nausea, vomiting, chest pain, SOB, changes in bowel movement.  No history of heart failure or kidney disease. Reports generalized weakness, not able to walk 2/2 worsening LE edema.   Hospital course c/b an episode of lethargy and hypotension. Started on bipap briefly however pt did not tolerate and it was promptly discontinued with improvement in pt's mental status. (02 Dec 2023 22:18)      PAST MEDICAL & SURGICAL HISTORY:  Chronic Acquired Lymphedema      Adult-Onset Obesity      Iron Deficiency Anemia      DM (diabetes mellitus)      HTN (hypertension)      No significant past surgical history          REVIEW OF SYSTEMS      General: non ambulatory	    Skin/Breast:  	  Ophthalmologic:  	  ENMT:	    Respiratory and Thorax:  	  Cardiovascular:	    Gastrointestinal:	  incontinent of stool    Genitourinary:	incontinent    Musculoskeletal:	    Neurological:	    Psychiatric:	    Hematology/Lymphatics:	    Endocrine:    reports that glucose is poorly controlled	    Allergic/Immunologic:	  Skin-  weeping lymphedema of bilat legs and left foot    MEDICATIONS  (STANDING):  atorvastatin 40 milliGRAM(s) Oral at bedtime  cefepime   IVPB      cefepime   IVPB 1000 milliGRAM(s) IV Intermittent every 24 hours  chlorhexidine 2% Cloths 1 Application(s) Topical daily  cholecalciferol 2000 Unit(s) Oral daily  dextrose 50% Injectable 25 Gram(s) IV Push once  dextrose 50% Injectable 25 Gram(s) IV Push once  dextrose 50% Injectable 12.5 Gram(s) IV Push once  glucagon  Injectable 1 milliGRAM(s) IntraMuscular once  glucagon  Injectable 1 milliGRAM(s) IntraMuscular once  heparin   Injectable 5000 Unit(s) SubCutaneous every 8 hours  influenza   Vaccine 0.5 milliLiter(s) IntraMuscular once  insulin lispro (ADMELOG) corrective regimen sliding scale   SubCutaneous three times a day before meals  insulin lispro (ADMELOG) corrective regimen sliding scale   SubCutaneous at bedtime  multivitamin 1 Tablet(s) Oral daily  mupirocin 2% Ointment 1 Application(s) Topical two times a day    MEDICATIONS  (PRN):  acetaminophen     Tablet .. 650 milliGRAM(s) Oral every 6 hours PRN Temp greater or equal to 38C (100.4F), Mild Pain (1 - 3)  dextrose Oral Gel 15 Gram(s) Oral once PRN Blood Glucose LESS THAN 70 milliGRAM(s)/deciliter  dextrose Oral Gel 15 Gram(s) Oral once PRN Blood Glucose LESS THAN 70 milliGRAM(s)/deciliter  melatonin 3 milliGRAM(s) Oral at bedtime PRN Insomnia  QUEtiapine 25 milliGRAM(s) Oral at bedtime PRN insomnia  traMADol 25 milliGRAM(s) Oral every 6 hours PRN Moderate Pain (4 - 6)      Allergies    No Known Allergies    Intolerances        SOCIAL HISTORY:    FAMILY HISTORY:  No pertinent family history in first degree relatives        Vital Signs Last 24 Hrs  T(C): 36.7 (04 Dec 2023 21:09), Max: 36.7 (04 Dec 2023 21:09)  T(F): 98.1 (04 Dec 2023 21:09), Max: 98.1 (04 Dec 2023 21:09)  HR: 93 (04 Dec 2023 21:09) (84 - 93)  BP: 141/76 (04 Dec 2023 21:09) (104/55 - 141/76)  BP(mean): --  RR: 18 (04 Dec 2023 21:09) (18 - 18)  SpO2: 100% (04 Dec 2023 21:09) (100% - 100%)    Parameters below as of 04 Dec 2023 21:09  Patient On (Oxygen Delivery Method): room air        PHYSICAL EXAM:      Constitutional:  obese, bedridden, black female, NAD    Eyes: no jaundice    ENMT:    Neck: supple    Breasts: intertrigo undersurface of bilat breasts    Back:    Respiratory: no wheezing    Cardiovascular:    Gastrointestinal: incontinent of stool    Genitourinary:  incontinent despite pure wick    Rectal:    Extremities:  keratosis of bilateral lower legs, wounds bilateral lower legs with very wet dressings  Right anterior lower leg: 3 X 5 X .3 cm  Right anterior lateral: wound associated with expressed purulent fluid, without cellulitis: 2 X 2 X .2cm  Right posterior excoriation: 15 X 16 cm.    Left posterior lower leg excoriation : 24 X 14cm  Left medial lower leg: 5 X 7 X .2 cm    Mil of bilateral lower legs and left footsaturated with urine and lymphedematous fluid    Continue Abx.,Reduced dose Cefepime and Vanco X 1 dose on admit then D/C   See podiatry note    Dressings placed- Adaptic and Aquacel AG kept in place with bilateral mil- need to be changed at least 2 X daily    Vascular: Warm feet    Neurological: Intact    Skin: see above    Lymph Nodes: none palpable    Musculoskeletal:   at bedrest    Psychiatric:   not delusional        LABS:    Elevated creatinine            Albumin: 2.8 g/dL (12-02 @ 07:41)  Albumin: 2.4 g/dL (12-02 @ 06:55)  Albumin: 2.5 g/dL (12-02 @ 05:37)          Assessment/Dx:  Diabetes Mellitus with Renal failure and bilateral lymphedema of lower legs and left foot  Treatment-   see above     Wound SURGERY CONSULT NOTE    FROM:   FOR:   Reason for Consult:    HPI:  Pt irritable and only intermittently cooperative with questioning by examiner. Additional history obtained via chart review.     63-year-old female with past medical history of DM, HTN, HLD, chronic bilateral lower extremity lymphedema presents today by EMS for lower extremity edema with malodorous discharge. Patient states that the lower extremity has been getting progressively worse over the past month since last admission and with malodorous d/c. She also reports associated pain in LE. Patient notes that she has been having symptoms of dysuria, increased urinary frequency for 1 month with confirmed UTI requiring antibiotics.  States that she has not taken her antibiotics because she "lost her medication". States her daughter was not feeding her because she wanted money from her so she reports poor po intake.  Denies fever, chills, nausea, vomiting, chest pain, SOB, changes in bowel movement.  No history of heart failure or kidney disease. Reports generalized weakness, not able to walk 2/2 worsening LE edema.   Hospital course c/b an episode of lethargy and hypotension. Started on bipap briefly however pt did not tolerate and it was promptly discontinued with improvement in pt's mental status. (02 Dec 2023 22:18)      PAST MEDICAL & SURGICAL HISTORY:  Chronic Acquired Lymphedema      Adult-Onset Obesity      Iron Deficiency Anemia      DM (diabetes mellitus)      HTN (hypertension)      No significant past surgical history          REVIEW OF SYSTEMS      General: non ambulatory	    Skin/Breast:  	  Ophthalmologic:  	  ENMT:	    Respiratory and Thorax:  	  Cardiovascular:	    Gastrointestinal:	  incontinent of stool    Genitourinary:	incontinent    Musculoskeletal:	    Neurological:	    Psychiatric:	    Hematology/Lymphatics:	    Endocrine:    reports that glucose is poorly controlled	    Allergic/Immunologic:	  Skin-  weeping lymphedema of bilat legs and left foot    MEDICATIONS  (STANDING):  atorvastatin 40 milliGRAM(s) Oral at bedtime  cefepime   IVPB      cefepime   IVPB 1000 milliGRAM(s) IV Intermittent every 24 hours  chlorhexidine 2% Cloths 1 Application(s) Topical daily  cholecalciferol 2000 Unit(s) Oral daily  dextrose 50% Injectable 25 Gram(s) IV Push once  dextrose 50% Injectable 25 Gram(s) IV Push once  dextrose 50% Injectable 12.5 Gram(s) IV Push once  glucagon  Injectable 1 milliGRAM(s) IntraMuscular once  glucagon  Injectable 1 milliGRAM(s) IntraMuscular once  heparin   Injectable 5000 Unit(s) SubCutaneous every 8 hours  influenza   Vaccine 0.5 milliLiter(s) IntraMuscular once  insulin lispro (ADMELOG) corrective regimen sliding scale   SubCutaneous three times a day before meals  insulin lispro (ADMELOG) corrective regimen sliding scale   SubCutaneous at bedtime  multivitamin 1 Tablet(s) Oral daily  mupirocin 2% Ointment 1 Application(s) Topical two times a day    MEDICATIONS  (PRN):  acetaminophen     Tablet .. 650 milliGRAM(s) Oral every 6 hours PRN Temp greater or equal to 38C (100.4F), Mild Pain (1 - 3)  dextrose Oral Gel 15 Gram(s) Oral once PRN Blood Glucose LESS THAN 70 milliGRAM(s)/deciliter  dextrose Oral Gel 15 Gram(s) Oral once PRN Blood Glucose LESS THAN 70 milliGRAM(s)/deciliter  melatonin 3 milliGRAM(s) Oral at bedtime PRN Insomnia  QUEtiapine 25 milliGRAM(s) Oral at bedtime PRN insomnia  traMADol 25 milliGRAM(s) Oral every 6 hours PRN Moderate Pain (4 - 6)      Allergies    No Known Allergies    Intolerances        SOCIAL HISTORY:    FAMILY HISTORY:  No pertinent family history in first degree relatives        Vital Signs Last 24 Hrs  T(C): 36.7 (04 Dec 2023 21:09), Max: 36.7 (04 Dec 2023 21:09)  T(F): 98.1 (04 Dec 2023 21:09), Max: 98.1 (04 Dec 2023 21:09)  HR: 93 (04 Dec 2023 21:09) (84 - 93)  BP: 141/76 (04 Dec 2023 21:09) (104/55 - 141/76)  BP(mean): --  RR: 18 (04 Dec 2023 21:09) (18 - 18)  SpO2: 100% (04 Dec 2023 21:09) (100% - 100%)    Parameters below as of 04 Dec 2023 21:09  Patient On (Oxygen Delivery Method): room air        PHYSICAL EXAM:      Constitutional:  obese, bedridden, black female, NAD    Eyes: no jaundice    ENMT:    Neck: supple    Breasts: intertrigo undersurface of bilat breasts    Back:    Respiratory: no wheezing    Cardiovascular:    Gastrointestinal: incontinent of stool    Genitourinary:  incontinent despite pure wick    Rectal:    Extremities:  keratosis of bilateral lower legs, wounds bilateral lower legs with very wet dressings  Right anterior lower leg: 3 X 5 X .3 cm  Right anterior lateral: wound associated with expressed purulent fluid, without cellulitis: 2 X 2 X .2cm  Right posterior excoriation: 15 X 16 cm.    Left posterior lower leg excoriation : 24 X 14cm  Left medial lower leg: 5 X 7 X .2 cm    Mil of bilateral lower legs and left footsaturated with urine and lymphedematous fluid    Continue Abx.,Reduced dose Cefepime and Vanco X 1 dose on admit then D/C   See podiatry note  OP f/u at wound center    Dressings placed- Adaptic and Aquacel AG kept in place with bilateral mil- need to be changed at least 2 X daily    Vascular: Warm feet    Neurological: Intact    Skin: see above    Lymph Nodes: none palpable    Musculoskeletal:   at bedrest    Psychiatric:   not delusional        LABS:    Elevated creatinine            Albumin: 2.8 g/dL (12-02 @ 07:41)  Albumin: 2.4 g/dL (12-02 @ 06:55)  Albumin: 2.5 g/dL (12-02 @ 05:37)          Assessment/Dx:  Diabetes Mellitus with Renal failure and bilateral lymphedema of lower legs and left foot  Treatment-   see above

## 2023-12-05 NOTE — BH CONSULTATION LIAISON PROGRESS NOTE - NSBHFUPINTERVALHXFT_PSY_A_CORE
Patient seen after returning from /S which patient appears to have understanding of, feels very tired because she still did nto sleep last night, told that we would make the med standing OK for patient to refuse, no si/hi patient future oriented and compliant with regimen here, amenable to going to rehab if indicated after hospitalziation, mood is OK  Patient seen after returning from /S which patient appears to have understanding of, feels very tired because she still did nto sleep last night, told that we would make the med standing OK for patient to refuse, no si/hi patient future oriented and compliant with regimen here, amenable to going to rehab if indicated after hospitalziation, mood is OK     see below re: collateral

## 2023-12-06 LAB
ANION GAP SERPL CALC-SCNC: 10 MMOL/L — SIGNIFICANT CHANGE UP (ref 7–14)
ANION GAP SERPL CALC-SCNC: 10 MMOL/L — SIGNIFICANT CHANGE UP (ref 7–14)
BUN SERPL-MCNC: 32 MG/DL — HIGH (ref 7–23)
BUN SERPL-MCNC: 32 MG/DL — HIGH (ref 7–23)
CALCIUM SERPL-MCNC: 8.7 MG/DL — SIGNIFICANT CHANGE UP (ref 8.4–10.5)
CALCIUM SERPL-MCNC: 8.7 MG/DL — SIGNIFICANT CHANGE UP (ref 8.4–10.5)
CHLORIDE SERPL-SCNC: 109 MMOL/L — HIGH (ref 98–107)
CHLORIDE SERPL-SCNC: 109 MMOL/L — HIGH (ref 98–107)
CO2 SERPL-SCNC: 22 MMOL/L — SIGNIFICANT CHANGE UP (ref 22–31)
CO2 SERPL-SCNC: 22 MMOL/L — SIGNIFICANT CHANGE UP (ref 22–31)
CREAT SERPL-MCNC: 1.51 MG/DL — HIGH (ref 0.5–1.3)
CREAT SERPL-MCNC: 1.51 MG/DL — HIGH (ref 0.5–1.3)
EGFR: 39 ML/MIN/1.73M2 — LOW
EGFR: 39 ML/MIN/1.73M2 — LOW
GLUCOSE BLDC GLUCOMTR-MCNC: 152 MG/DL — HIGH (ref 70–99)
GLUCOSE BLDC GLUCOMTR-MCNC: 152 MG/DL — HIGH (ref 70–99)
GLUCOSE BLDC GLUCOMTR-MCNC: 191 MG/DL — HIGH (ref 70–99)
GLUCOSE BLDC GLUCOMTR-MCNC: 191 MG/DL — HIGH (ref 70–99)
GLUCOSE BLDC GLUCOMTR-MCNC: 195 MG/DL — HIGH (ref 70–99)
GLUCOSE BLDC GLUCOMTR-MCNC: 195 MG/DL — HIGH (ref 70–99)
GLUCOSE BLDC GLUCOMTR-MCNC: 201 MG/DL — HIGH (ref 70–99)
GLUCOSE BLDC GLUCOMTR-MCNC: 201 MG/DL — HIGH (ref 70–99)
GLUCOSE SERPL-MCNC: 145 MG/DL — HIGH (ref 70–99)
GLUCOSE SERPL-MCNC: 145 MG/DL — HIGH (ref 70–99)
HCT VFR BLD CALC: 23.1 % — LOW (ref 34.5–45)
HCT VFR BLD CALC: 23.1 % — LOW (ref 34.5–45)
HGB BLD-MCNC: 7.6 G/DL — LOW (ref 11.5–15.5)
HGB BLD-MCNC: 7.6 G/DL — LOW (ref 11.5–15.5)
MAGNESIUM SERPL-MCNC: 1.7 MG/DL — SIGNIFICANT CHANGE UP (ref 1.6–2.6)
MAGNESIUM SERPL-MCNC: 1.7 MG/DL — SIGNIFICANT CHANGE UP (ref 1.6–2.6)
MCHC RBC-ENTMCNC: 21.7 PG — LOW (ref 27–34)
MCHC RBC-ENTMCNC: 21.7 PG — LOW (ref 27–34)
MCHC RBC-ENTMCNC: 32.9 GM/DL — SIGNIFICANT CHANGE UP (ref 32–36)
MCHC RBC-ENTMCNC: 32.9 GM/DL — SIGNIFICANT CHANGE UP (ref 32–36)
MCV RBC AUTO: 66 FL — LOW (ref 80–100)
MCV RBC AUTO: 66 FL — LOW (ref 80–100)
NRBC # BLD: 0 /100 WBCS — SIGNIFICANT CHANGE UP (ref 0–0)
NRBC # BLD: 0 /100 WBCS — SIGNIFICANT CHANGE UP (ref 0–0)
NRBC # FLD: 0 K/UL — SIGNIFICANT CHANGE UP (ref 0–0)
NRBC # FLD: 0 K/UL — SIGNIFICANT CHANGE UP (ref 0–0)
PHOSPHATE SERPL-MCNC: 3.5 MG/DL — SIGNIFICANT CHANGE UP (ref 2.5–4.5)
PHOSPHATE SERPL-MCNC: 3.5 MG/DL — SIGNIFICANT CHANGE UP (ref 2.5–4.5)
PLATELET # BLD AUTO: 431 K/UL — HIGH (ref 150–400)
PLATELET # BLD AUTO: 431 K/UL — HIGH (ref 150–400)
POTASSIUM SERPL-MCNC: 3.9 MMOL/L — SIGNIFICANT CHANGE UP (ref 3.5–5.3)
POTASSIUM SERPL-MCNC: 3.9 MMOL/L — SIGNIFICANT CHANGE UP (ref 3.5–5.3)
POTASSIUM SERPL-SCNC: 3.9 MMOL/L — SIGNIFICANT CHANGE UP (ref 3.5–5.3)
POTASSIUM SERPL-SCNC: 3.9 MMOL/L — SIGNIFICANT CHANGE UP (ref 3.5–5.3)
RBC # BLD: 3.5 M/UL — LOW (ref 3.8–5.2)
RBC # BLD: 3.5 M/UL — LOW (ref 3.8–5.2)
RBC # FLD: 19.9 % — HIGH (ref 10.3–14.5)
RBC # FLD: 19.9 % — HIGH (ref 10.3–14.5)
SODIUM SERPL-SCNC: 141 MMOL/L — SIGNIFICANT CHANGE UP (ref 135–145)
SODIUM SERPL-SCNC: 141 MMOL/L — SIGNIFICANT CHANGE UP (ref 135–145)
VANCOMYCIN FLD-MCNC: 11.2 UG/ML — SIGNIFICANT CHANGE UP
VANCOMYCIN FLD-MCNC: 11.2 UG/ML — SIGNIFICANT CHANGE UP
WBC # BLD: 8.68 K/UL — SIGNIFICANT CHANGE UP (ref 3.8–10.5)
WBC # BLD: 8.68 K/UL — SIGNIFICANT CHANGE UP (ref 3.8–10.5)
WBC # FLD AUTO: 8.68 K/UL — SIGNIFICANT CHANGE UP (ref 3.8–10.5)
WBC # FLD AUTO: 8.68 K/UL — SIGNIFICANT CHANGE UP (ref 3.8–10.5)

## 2023-12-06 PROCEDURE — 99232 SBSQ HOSP IP/OBS MODERATE 35: CPT

## 2023-12-06 RX ORDER — VANCOMYCIN HCL 1 G
1250 VIAL (EA) INTRAVENOUS ONCE
Refills: 0 | Status: COMPLETED | OUTPATIENT
Start: 2023-12-06 | End: 2023-12-06

## 2023-12-06 RX ORDER — QUETIAPINE FUMARATE 200 MG/1
25 TABLET, FILM COATED ORAL AT BEDTIME
Refills: 0 | Status: DISCONTINUED | OUTPATIENT
Start: 2023-12-06 | End: 2023-12-18

## 2023-12-06 RX ADMIN — MUPIROCIN 1 APPLICATION(S): 20 OINTMENT TOPICAL at 17:44

## 2023-12-06 RX ADMIN — Medication 2: at 17:44

## 2023-12-06 RX ADMIN — Medication 1: at 13:01

## 2023-12-06 RX ADMIN — TRAMADOL HYDROCHLORIDE 25 MILLIGRAM(S): 50 TABLET ORAL at 05:15

## 2023-12-06 RX ADMIN — ATORVASTATIN CALCIUM 40 MILLIGRAM(S): 80 TABLET, FILM COATED ORAL at 21:38

## 2023-12-06 RX ADMIN — Medication 2000 UNIT(S): at 13:02

## 2023-12-06 RX ADMIN — TRAMADOL HYDROCHLORIDE 25 MILLIGRAM(S): 50 TABLET ORAL at 21:44

## 2023-12-06 RX ADMIN — CEFEPIME 100 MILLIGRAM(S): 1 INJECTION, POWDER, FOR SOLUTION INTRAMUSCULAR; INTRAVENOUS at 21:38

## 2023-12-06 RX ADMIN — TRAMADOL HYDROCHLORIDE 25 MILLIGRAM(S): 50 TABLET ORAL at 16:40

## 2023-12-06 RX ADMIN — QUETIAPINE FUMARATE 25 MILLIGRAM(S): 200 TABLET, FILM COATED ORAL at 21:38

## 2023-12-06 RX ADMIN — TRAMADOL HYDROCHLORIDE 25 MILLIGRAM(S): 50 TABLET ORAL at 15:46

## 2023-12-06 RX ADMIN — Medication 1 TABLET(S): at 13:02

## 2023-12-06 RX ADMIN — MUPIROCIN 1 APPLICATION(S): 20 OINTMENT TOPICAL at 05:21

## 2023-12-06 RX ADMIN — HEPARIN SODIUM 5000 UNIT(S): 5000 INJECTION INTRAVENOUS; SUBCUTANEOUS at 21:39

## 2023-12-06 NOTE — PROGRESS NOTE ADULT - SUBJECTIVE AND OBJECTIVE BOX
Medicine Progress Note    Patient is a 63y old  Female who presents with a chief complaint of le edema/pain (05 Dec 2023 12:50)      SUBJECTIVE / OVERNIGHT EVENTS:  no events,  MRI pending     MEDICATIONS  (STANDING):  atorvastatin 40 milliGRAM(s) Oral at bedtime  cefepime   IVPB      cefepime   IVPB 1000 milliGRAM(s) IV Intermittent every 24 hours  chlorhexidine 2% Cloths 1 Application(s) Topical daily  cholecalciferol 2000 Unit(s) Oral daily  dextrose 50% Injectable 25 Gram(s) IV Push once  dextrose 50% Injectable 25 Gram(s) IV Push once  dextrose 50% Injectable 12.5 Gram(s) IV Push once  glucagon  Injectable 1 milliGRAM(s) IntraMuscular once  glucagon  Injectable 1 milliGRAM(s) IntraMuscular once  heparin   Injectable 5000 Unit(s) SubCutaneous every 8 hours  influenza   Vaccine 0.5 milliLiter(s) IntraMuscular once  insulin lispro (ADMELOG) corrective regimen sliding scale   SubCutaneous three times a day before meals  insulin lispro (ADMELOG) corrective regimen sliding scale   SubCutaneous at bedtime  multivitamin 1 Tablet(s) Oral daily  mupirocin 2% Ointment 1 Application(s) Topical two times a day  QUEtiapine 25 milliGRAM(s) Oral at bedtime    MEDICATIONS  (PRN):  acetaminophen     Tablet .. 650 milliGRAM(s) Oral every 6 hours PRN Temp greater or equal to 38C (100.4F), Mild Pain (1 - 3)  dextrose Oral Gel 15 Gram(s) Oral once PRN Blood Glucose LESS THAN 70 milliGRAM(s)/deciliter  dextrose Oral Gel 15 Gram(s) Oral once PRN Blood Glucose LESS THAN 70 milliGRAM(s)/deciliter  melatonin 3 milliGRAM(s) Oral at bedtime PRN Insomnia  traMADol 25 milliGRAM(s) Oral every 6 hours PRN Moderate Pain (4 - 6)    CAPILLARY BLOOD GLUCOSE      POCT Blood Glucose.: 201 mg/dL (06 Dec 2023 17:21)  POCT Blood Glucose.: 191 mg/dL (06 Dec 2023 12:42)  POCT Blood Glucose.: 152 mg/dL (06 Dec 2023 09:14)  POCT Blood Glucose.: 169 mg/dL (05 Dec 2023 22:07)    I&O's Summary      PHYSICAL EXAM:  Vital Signs Last 24 Hrs  T(C): 36.4 (06 Dec 2023 15:43), Max: 36.8 (05 Dec 2023 22:05)  T(F): 97.6 (06 Dec 2023 15:43), Max: 98.2 (05 Dec 2023 22:05)  HR: 85 (06 Dec 2023 15:43) (76 - 85)  BP: 117/55 (06 Dec 2023 15:43) (113/65 - 117/55)  BP(mean): --  RR: 18 (06 Dec 2023 15:43) (17 - 18)  SpO2: 100% (06 Dec 2023 15:43) (100% - 100%)    Parameters below as of 06 Dec 2023 15:43  Patient On (Oxygen Delivery Method): room air      CONSTITUTIONAL: NAD,  ENMT: Moist oral mucosa, no pharyngeal injection or exudates;   RESPIRATORY: Normal respiratory effort; lungs are clear to auscultation bilaterally  CARDIOVASCULAR: Regular rate and rhythm, normal S1 and S2, B/L  lower extremity edema/elephantiasis; wounds   ABDOMEN: Nontender to palpation, normoactive bowel sounds, no rebound/guarding;   PSYCH: A+O to person, place, and time; affect appropriate  NEUROLOGY: CN 2-12 are intact and symmetric; no gross sensory deficits   SKIN: as above     LABS:                        8.1    8.94  )-----------( 429      ( 05 Dec 2023 15:40 )             24.8     12-05    140  |  107  |  48<H>  ----------------------------<  171<H>  3.6   |  23  |  1.74<H>    Ca    8.7      05 Dec 2023 15:40  Phos  3.9     12-05  Mg     1.90     12-05    TPro  8.1  /  Alb  2.4<L>  /  TBili  <0.2  /  DBili  x   /  AST  13  /  ALT  18  /  AlkPhos  93  12-05          Urinalysis Basic - ( 05 Dec 2023 15:40 )    Color: x / Appearance: x / SG: x / pH: x  Gluc: 171 mg/dL / Ketone: x  / Bili: x / Urobili: x   Blood: x / Protein: x / Nitrite: x   Leuk Esterase: x / RBC: x / WBC x   Sq Epi: x / Non Sq Epi: x / Bacteria: x        Culture - Blood (collected 03 Dec 2023 20:30)  Source: .Blood Blood-Peripheral  Preliminary Report (06 Dec 2023 11:01):    No growth at 48 Hours    Culture - Blood (collected 03 Dec 2023 20:00)  Source: .Blood Blood-Peripheral  Preliminary Report (06 Dec 2023 01:02):    No growth at 48 Hours      SARS-CoV-2: Detected (11 Nov 2023 06:12)  COVID-19 PCR: Detected (10 Nov 2023 10:27)      RADIOLOGY & ADDITIONAL TESTS:  Imaging from Last 24 Hours:    Electrocardiogram/QTc Interval:    COORDINATION OF CARE:  Care Discussed with Consultants/Other Providers: ACP

## 2023-12-07 LAB
% ALBUMIN: 27.7 % — SIGNIFICANT CHANGE UP
% ALBUMIN: 27.7 % — SIGNIFICANT CHANGE UP
% ALPHA 1: 4.9 % — SIGNIFICANT CHANGE UP
% ALPHA 1: 4.9 % — SIGNIFICANT CHANGE UP
% ALPHA 2: 13 % — SIGNIFICANT CHANGE UP
% ALPHA 2: 13 % — SIGNIFICANT CHANGE UP
% BETA: 14.2 % — SIGNIFICANT CHANGE UP
% BETA: 14.2 % — SIGNIFICANT CHANGE UP
% GAMMA, URINE: PRESENT
% GAMMA, URINE: PRESENT
% GAMMA: 40.2 % — SIGNIFICANT CHANGE UP
% GAMMA: 40.2 % — SIGNIFICANT CHANGE UP
ALBUMIN 24H MFR UR ELPH: PRESENT
ALBUMIN 24H MFR UR ELPH: PRESENT
ALBUMIN SERPL ELPH-MCNC: 2.13 G/DL — LOW (ref 3.3–4.4)
ALBUMIN SERPL ELPH-MCNC: 2.13 G/DL — LOW (ref 3.3–4.4)
ALBUMIN/GLOB SERPL ELPH: 0.4 RATIO — SIGNIFICANT CHANGE UP
ALBUMIN/GLOB SERPL ELPH: 0.4 RATIO — SIGNIFICANT CHANGE UP
ALPHA1 GLOB 24H MFR UR ELPH: PRESENT
ALPHA1 GLOB 24H MFR UR ELPH: PRESENT
ALPHA1 GLOB SERPL ELPH-MCNC: 0.38 G/DL — HIGH (ref 0.1–0.3)
ALPHA1 GLOB SERPL ELPH-MCNC: 0.38 G/DL — HIGH (ref 0.1–0.3)
ALPHA2 GLOB 24H MFR UR ELPH: PRESENT
ALPHA2 GLOB 24H MFR UR ELPH: PRESENT
ALPHA2 GLOB SERPL ELPH-MCNC: 1 G/DL — SIGNIFICANT CHANGE UP (ref 0.6–1)
ALPHA2 GLOB SERPL ELPH-MCNC: 1 G/DL — SIGNIFICANT CHANGE UP (ref 0.6–1)
B-GLOBULIN 24H MFR UR ELPH: PRESENT
B-GLOBULIN 24H MFR UR ELPH: PRESENT
B-GLOBULIN SERPL ELPH-MCNC: 1.09 G/DL — SIGNIFICANT CHANGE UP (ref 0.6–1.1)
B-GLOBULIN SERPL ELPH-MCNC: 1.09 G/DL — SIGNIFICANT CHANGE UP (ref 0.6–1.1)
GAMMA GLOBULIN: 3.1 G/DL — HIGH (ref 0.7–1.7)
GAMMA GLOBULIN: 3.1 G/DL — HIGH (ref 0.7–1.7)
GLUCOSE BLDC GLUCOMTR-MCNC: 147 MG/DL — HIGH (ref 70–99)
GLUCOSE BLDC GLUCOMTR-MCNC: 147 MG/DL — HIGH (ref 70–99)
GLUCOSE BLDC GLUCOMTR-MCNC: 176 MG/DL — HIGH (ref 70–99)
GLUCOSE BLDC GLUCOMTR-MCNC: 176 MG/DL — HIGH (ref 70–99)
GLUCOSE BLDC GLUCOMTR-MCNC: 200 MG/DL — HIGH (ref 70–99)
GLUCOSE BLDC GLUCOMTR-MCNC: 200 MG/DL — HIGH (ref 70–99)
GLUCOSE BLDC GLUCOMTR-MCNC: 223 MG/DL — HIGH (ref 70–99)
GLUCOSE BLDC GLUCOMTR-MCNC: 223 MG/DL — HIGH (ref 70–99)
M PROTEIN 24H UR ELPH-MRATE: SIGNIFICANT CHANGE UP
M PROTEIN 24H UR ELPH-MRATE: SIGNIFICANT CHANGE UP
PROT ?TM UR-MCNC: 40 MG/DL — SIGNIFICANT CHANGE UP
PROT ?TM UR-MCNC: 40 MG/DL — SIGNIFICANT CHANGE UP
PROT PATTERN 24H UR ELPH-IMP: SIGNIFICANT CHANGE UP
PROT PATTERN 24H UR ELPH-IMP: SIGNIFICANT CHANGE UP
PROT PATTERN SERPL ELPH-IMP: SIGNIFICANT CHANGE UP
PROT PATTERN SERPL ELPH-IMP: SIGNIFICANT CHANGE UP
PROT SERPL-MCNC: 7.7 G/DL — SIGNIFICANT CHANGE UP
PROT SERPL-MCNC: 7.7 G/DL — SIGNIFICANT CHANGE UP

## 2023-12-07 PROCEDURE — 99231 SBSQ HOSP IP/OBS SF/LOW 25: CPT

## 2023-12-07 PROCEDURE — 99232 SBSQ HOSP IP/OBS MODERATE 35: CPT

## 2023-12-07 RX ORDER — VANCOMYCIN HCL 1 G
1250 VIAL (EA) INTRAVENOUS EVERY 24 HOURS
Refills: 0 | Status: DISCONTINUED | OUTPATIENT
Start: 2023-12-07 | End: 2023-12-07

## 2023-12-07 RX ORDER — CEFEPIME 1 G/1
2000 INJECTION, POWDER, FOR SOLUTION INTRAMUSCULAR; INTRAVENOUS
Refills: 0 | Status: COMPLETED | OUTPATIENT
Start: 2023-12-07 | End: 2023-12-11

## 2023-12-07 RX ORDER — VANCOMYCIN HCL 1 G
1250 VIAL (EA) INTRAVENOUS EVERY 24 HOURS
Refills: 0 | Status: COMPLETED | OUTPATIENT
Start: 2023-12-07 | End: 2023-12-11

## 2023-12-07 RX ADMIN — Medication 2: at 12:29

## 2023-12-07 RX ADMIN — Medication 1: at 08:53

## 2023-12-07 RX ADMIN — Medication 2000 UNIT(S): at 12:28

## 2023-12-07 RX ADMIN — Medication 1 TABLET(S): at 12:28

## 2023-12-07 RX ADMIN — HEPARIN SODIUM 5000 UNIT(S): 5000 INJECTION INTRAVENOUS; SUBCUTANEOUS at 12:28

## 2023-12-07 RX ADMIN — ATORVASTATIN CALCIUM 40 MILLIGRAM(S): 80 TABLET, FILM COATED ORAL at 22:10

## 2023-12-07 RX ADMIN — CEFEPIME 100 MILLIGRAM(S): 1 INJECTION, POWDER, FOR SOLUTION INTRAMUSCULAR; INTRAVENOUS at 17:59

## 2023-12-07 RX ADMIN — CHLORHEXIDINE GLUCONATE 1 APPLICATION(S): 213 SOLUTION TOPICAL at 12:30

## 2023-12-07 RX ADMIN — TRAMADOL HYDROCHLORIDE 25 MILLIGRAM(S): 50 TABLET ORAL at 22:11

## 2023-12-07 RX ADMIN — Medication 166.67 MILLIGRAM(S): at 23:00

## 2023-12-07 RX ADMIN — Medication 166.67 MILLIGRAM(S): at 00:17

## 2023-12-07 RX ADMIN — QUETIAPINE FUMARATE 25 MILLIGRAM(S): 200 TABLET, FILM COATED ORAL at 22:10

## 2023-12-07 RX ADMIN — TRAMADOL HYDROCHLORIDE 25 MILLIGRAM(S): 50 TABLET ORAL at 06:15

## 2023-12-07 RX ADMIN — Medication 1: at 17:58

## 2023-12-07 RX ADMIN — HEPARIN SODIUM 5000 UNIT(S): 5000 INJECTION INTRAVENOUS; SUBCUTANEOUS at 22:11

## 2023-12-07 RX ADMIN — MUPIROCIN 1 APPLICATION(S): 20 OINTMENT TOPICAL at 17:58

## 2023-12-07 RX ADMIN — TRAMADOL HYDROCHLORIDE 25 MILLIGRAM(S): 50 TABLET ORAL at 07:15

## 2023-12-07 NOTE — PROGRESS NOTE ADULT - ASSESSMENT
This is a 62 y/o F w/ PMHx of DM, HTN, HLD, chronic b/l lymphedema who presents to [facility] on 12/2 for LE wounds with malodorous discharge that has been worsening. Also w/ dysuria.  Pt was briefly lethargic/hypotensive, started on BiPAP but quickly discontinued. Pt was afebrile, WBC 14.8.   Seen by podiatry for L foot bullae, no findings of active infection. L foot XR 5th MT head possible erosion. U/A with 84 WBCs.   ID now consulted for leukocytosis in the setting of LE drainage, positive U/A and L foot XR w/ 5th MT erosion     #LE lymphedema, with malodorous discharge  #Sepsis  #UTI  #L fifth metatarsal lucency seen on XR, c/f OM    Overall leukocytosis possible to SSTI, OM, UTI. Would continue with empiric coverage with Vancomycin/Cefepime, f/u UCx. Obtain imaging of LLE. WBC improved w/ empiric Vancomycin, Cefepime, wounds on RLE, purulent/malodorous drainage.     Plan:   1. Vancomycin 1250 mg q24, check random level in AM   2. Increase Cefepime to 2 g q12  3. Wound care c/s  4. Would obtain further imaging of L leg    Thank you for this consult. Inpatient ID team will follow.    Jose Alberto Tomas M.D.  Attending Physician  Division of Infectious Diseases  Department of Medicine    Please contact through MS Teams message.  Office: [phone] (after 5 PM or weekend) This is a 62 y/o F w/ PMHx of DM, HTN, HLD, chronic b/l lymphedema who presents to [facility] on 12/2 for LE wounds with malodorous discharge that has been worsening. Also w/ dysuria.  Pt was briefly lethargic/hypotensive, started on BiPAP but quickly discontinued. Pt was afebrile, WBC 14.8.   Seen by podiatry for L foot bullae, no findings of active infection. L foot XR 5th MT head possible erosion. U/A with 84 WBCs.   ID now consulted for leukocytosis in the setting of LE drainage, positive U/A and L foot XR w/ 5th MT erosion     #LE lymphedema, with malodorous discharge  #Sepsis  #UTI  #L fifth metatarsal lucency seen on XR, c/f OM    Overall leukocytosis possible to SSTI, OM, UTI. Would continue with empiric coverage with Vancomycin/Cefepime, f/u UCx. Obtain imaging of LLE. WBC improved w/ empiric Vancomycin, Cefepime, wounds on RLE, purulent/malodorous drainage.     Plan:   1. Vancomycin 1250 mg q24, check random level in AM   2. Increase Cefepime to 2 g q12  3. Wound care c/s  4. Would obtain further imaging of L leg    Thank you for this consult. Inpatient ID team will follow.    Jose Alberto Tomas M.D.  Attending Physician  Division of Infectious Diseases  Department of Medicine    Please contact through MS Teams message.  Office: [phone] (after 5 PM or weekend)

## 2023-12-07 NOTE — PROGRESS NOTE ADULT - PROBLEM SELECTOR PLAN 1
-pt p/w leukocytosis, tachy with multiple potential sources of infection -skin/soft tissue, OM, UTI, PNA  -treat empirically with vanc (by level) and cefepime --f/w ID recs   -f/u MRI of left foot to r/o OM of 5th digit   f/u blood and urine cultures grew E coli  -f/u MRSA swab  - reconsult podiatry if MRI findings c/f OM  - ID consulted, appreciate recs  -f/w wound consult recs   Doppler b/l LE r/o DVT negative

## 2023-12-07 NOTE — BH CONSULTATION LIAISON PROGRESS NOTE - NSBHHPIREASONCLOTHER_PSY_A_CORE FT
Cimzia Pregnancy And Lactation Text: This medication crosses the placenta but can be considered safe in certain situations. Cimzia may be excreted in breast milk. family request

## 2023-12-07 NOTE — PROGRESS NOTE ADULT - SUBJECTIVE AND OBJECTIVE BOX
Infectious Diseases Follow Up:    Patient is a 63y old  Female who presents with a chief complaint of le edema/pain (06 Dec 2023 19:24)      Interval History/ROS:  No acute events ON, pt afebrile   Dressing changed today AM, pt still w/ LE pain     Allergies  No Known Allergies        ANTIMICROBIALS:  cefepime   IVPB    cefepime   IVPB 1000 every 24 hours      Current Abx:     Previous Abx     OTHER MEDS:  MEDICATIONS  (STANDING):  acetaminophen     Tablet .. 650 every 6 hours PRN  atorvastatin 40 at bedtime  dextrose 50% Injectable 25 once  dextrose 50% Injectable 25 once  dextrose 50% Injectable 12.5 once  dextrose Oral Gel 15 once PRN  dextrose Oral Gel 15 once PRN  glucagon  Injectable 1 once  glucagon  Injectable 1 once  heparin   Injectable 5000 every 8 hours  influenza   Vaccine 0.5 once  insulin lispro (ADMELOG) corrective regimen sliding scale  at bedtime  insulin lispro (ADMELOG) corrective regimen sliding scale  three times a day before meals  melatonin 3 at bedtime PRN  QUEtiapine 25 at bedtime  traMADol 25 every 6 hours PRN      Vital Signs Last 24 Hrs  T(C): 36.6 (07 Dec 2023 05:00), Max: 36.6 (07 Dec 2023 05:00)  T(F): 97.8 (07 Dec 2023 05:00), Max: 97.8 (07 Dec 2023 05:00)  HR: 84 (07 Dec 2023 05:00) (84 - 85)  BP: 124/71 (07 Dec 2023 05:00) (117/55 - 124/71)  BP(mean): --  RR: 20 (07 Dec 2023 05:00) (18 - 20)  SpO2: 99% (07 Dec 2023 05:00) (99% - 100%)    Parameters below as of 06 Dec 2023 15:43  Patient On (Oxygen Delivery Method): room air        PHYSICAL EXAM:  GENERAL: NAD, well-developed  HEAD:  Atraumatic, Normocephalic  EYES: EOMI, PERRLA, conjunctiva and sclera clear  NECK: Supple, No JVD  CHEST/LUNG: Clear to auscultation bilaterally; No wheeze  HEART: Regular rate and rhythm; No murmurs, rubs, or gallops  ABDOMEN: Soft, Nontender, Nondistended; Bowel sounds present  EXTREMITIES:  B/l LE dressed   PSYCH: AAOx3                          7.6    8.68  )-----------( 431      ( 06 Dec 2023 18:50 )             23.1       12-06    141  |  109<H>  |  32<H>  ----------------------------<  145<H>  3.9   |  22  |  1.51<H>    Ca    8.7      06 Dec 2023 18:50  Phos  3.5     12-06  Mg     1.70     12-06    TPro  8.1  /  Alb  2.4<L>  /  TBili  <0.2  /  DBili  x   /  AST  13  /  ALT  18  /  AlkPhos  93  12-05      Urinalysis Basic - ( 06 Dec 2023 18:50 )    Color: x / Appearance: x / SG: x / pH: x  Gluc: 145 mg/dL / Ketone: x  / Bili: x / Urobili: x   Blood: x / Protein: x / Nitrite: x   Leuk Esterase: x / RBC: x / WBC x   Sq Epi: x / Non Sq Epi: x / Bacteria: x        MICROBIOLOGY:  Vancomycin Level, Random: 11.2 ug/mL (12-06-23 @ 18:50)  v  .Blood Blood-Peripheral  12-03-23   No growth at 72 Hours  --  --      .Blood Blood-Peripheral  12-03-23   No growth at 72 Hours  --  --      Clean Catch Clean Catch (Midstream)  12-02-23   >100,000 CFU/ml Escherichia coli  --  Escherichia coli                RADIOLOGY:

## 2023-12-07 NOTE — BH CONSULTATION LIAISON PROGRESS NOTE - CURRENT MEDICATION
MEDICATIONS  (STANDING):  atorvastatin 40 milliGRAM(s) Oral at bedtime  cefepime   IVPB      cefepime   IVPB 1000 milliGRAM(s) IV Intermittent every 24 hours  chlorhexidine 2% Cloths 1 Application(s) Topical daily  cholecalciferol 2000 Unit(s) Oral daily  dextrose 50% Injectable 25 Gram(s) IV Push once  dextrose 50% Injectable 25 Gram(s) IV Push once  dextrose 50% Injectable 12.5 Gram(s) IV Push once  glucagon  Injectable 1 milliGRAM(s) IntraMuscular once  glucagon  Injectable 1 milliGRAM(s) IntraMuscular once  heparin   Injectable 5000 Unit(s) SubCutaneous every 8 hours  influenza   Vaccine 0.5 milliLiter(s) IntraMuscular once  insulin lispro (ADMELOG) corrective regimen sliding scale   SubCutaneous at bedtime  insulin lispro (ADMELOG) corrective regimen sliding scale   SubCutaneous three times a day before meals  multivitamin 1 Tablet(s) Oral daily  mupirocin 2% Ointment 1 Application(s) Topical two times a day    MEDICATIONS  (PRN):  acetaminophen     Tablet .. 650 milliGRAM(s) Oral every 6 hours PRN Temp greater or equal to 38C (100.4F), Mild Pain (1 - 3)  dextrose Oral Gel 15 Gram(s) Oral once PRN Blood Glucose LESS THAN 70 milliGRAM(s)/deciliter  dextrose Oral Gel 15 Gram(s) Oral once PRN Blood Glucose LESS THAN 70 milliGRAM(s)/deciliter  melatonin 3 milliGRAM(s) Oral at bedtime PRN Insomnia  QUEtiapine 25 milliGRAM(s) Oral at bedtime PRN insomnia  traMADol 25 milliGRAM(s) Oral every 6 hours PRN Moderate Pain (4 - 6)  
MEDICATIONS  (STANDING):  atorvastatin 40 milliGRAM(s) Oral at bedtime  cefepime   IVPB 2000 milliGRAM(s) IV Intermittent two times a day  chlorhexidine 2% Cloths 1 Application(s) Topical daily  cholecalciferol 2000 Unit(s) Oral daily  dextrose 50% Injectable 25 Gram(s) IV Push once  dextrose 50% Injectable 25 Gram(s) IV Push once  dextrose 50% Injectable 12.5 Gram(s) IV Push once  glucagon  Injectable 1 milliGRAM(s) IntraMuscular once  glucagon  Injectable 1 milliGRAM(s) IntraMuscular once  heparin   Injectable 5000 Unit(s) SubCutaneous every 8 hours  influenza   Vaccine 0.5 milliLiter(s) IntraMuscular once  insulin lispro (ADMELOG) corrective regimen sliding scale   SubCutaneous three times a day before meals  insulin lispro (ADMELOG) corrective regimen sliding scale   SubCutaneous at bedtime  multivitamin 1 Tablet(s) Oral daily  mupirocin 2% Ointment 1 Application(s) Topical two times a day  QUEtiapine 25 milliGRAM(s) Oral at bedtime    MEDICATIONS  (PRN):  acetaminophen     Tablet .. 650 milliGRAM(s) Oral every 6 hours PRN Temp greater or equal to 38C (100.4F), Mild Pain (1 - 3)  dextrose Oral Gel 15 Gram(s) Oral once PRN Blood Glucose LESS THAN 70 milliGRAM(s)/deciliter  dextrose Oral Gel 15 Gram(s) Oral once PRN Blood Glucose LESS THAN 70 milliGRAM(s)/deciliter  melatonin 3 milliGRAM(s) Oral at bedtime PRN Insomnia  traMADol 25 milliGRAM(s) Oral every 6 hours PRN Moderate Pain (4 - 6)

## 2023-12-07 NOTE — BH CONSULTATION LIAISON PROGRESS NOTE - NSBHFUPINTERVALHXFT_PSY_A_CORE
Patient was seen and assessed at bedside. Patient c/o bl leg pain. asking to speak to MD about pain control. patient is calm, cooperative, appreciative of care. no SI or HI reported. Taking Seroquel at night, states she still cannot sleep but feels it is secondary to pain. Denies psychosis. Denies desire for increase in medications.

## 2023-12-07 NOTE — BH CONSULTATION LIAISON PROGRESS NOTE - NSBHCHARTREVIEWLAB_PSY_A_CORE FT
7.6    8.68  )-----------( 431      ( 06 Dec 2023 18:50 )             23.1   12-06    141  |  109<H>  |  32<H>  ----------------------------<  145<H>  3.9   |  22  |  1.51<H>    Ca    8.7      06 Dec 2023 18:50  Phos  3.5     12-06  Mg     1.70     12-06    TPro  8.1  /  Alb  2.4<L>  /  TBili  <0.2  /  DBili  x   /  AST  13  /  ALT  18  /  AlkPhos  93  12-05  
                      7.8    11.29 )-----------( 400      ( 03 Dec 2023 05:27 )             23.2   12-03    136  |  104  |  83<H>  ----------------------------<  171<H>  3.5   |  17<L>  |  3.72<H>    Ca    7.7<L>      03 Dec 2023 05:27  Phos  5.1     12-03  Mg     2.30     12-03    TPro  7.7  /  Alb  x   /  TBili  x   /  DBili  x   /  AST  x   /  ALT  x   /  AlkPhos  x   12-03

## 2023-12-07 NOTE — BH CONSULTATION LIAISON PROGRESS NOTE - NSBHCONSULTFOLLOWAFTERCARE_PSY_A_CORE FT
Should f/u with [facility] psych [phone]    [facility]  [address] [place] [address], [address] Floor, [place] [address]  [phone]  [email or web address] Should f/u with [facility] psych [phone]    [facility]  [address] [place] [address], [address] Floor, [place] [address]  [phone]  [email or web address]

## 2023-12-07 NOTE — PROGRESS NOTE ADULT - SUBJECTIVE AND OBJECTIVE BOX
Medicine Progress Note    Patient is a 63y old  Female who presents with a chief complaint of le edema/pain (07 Dec 2023 11:54)      SUBJECTIVE / OVERNIGHT EVENTS:  no events , pending MRI , pain poorly controlled     MEDICATIONS  (STANDING):  atorvastatin 40 milliGRAM(s) Oral at bedtime  cefepime   IVPB 2000 milliGRAM(s) IV Intermittent two times a day  chlorhexidine 2% Cloths 1 Application(s) Topical daily  cholecalciferol 2000 Unit(s) Oral daily  dextrose 50% Injectable 25 Gram(s) IV Push once  dextrose 50% Injectable 25 Gram(s) IV Push once  dextrose 50% Injectable 12.5 Gram(s) IV Push once  glucagon  Injectable 1 milliGRAM(s) IntraMuscular once  glucagon  Injectable 1 milliGRAM(s) IntraMuscular once  heparin   Injectable 5000 Unit(s) SubCutaneous every 8 hours  influenza   Vaccine 0.5 milliLiter(s) IntraMuscular once  insulin lispro (ADMELOG) corrective regimen sliding scale   SubCutaneous at bedtime  insulin lispro (ADMELOG) corrective regimen sliding scale   SubCutaneous three times a day before meals  multivitamin 1 Tablet(s) Oral daily  mupirocin 2% Ointment 1 Application(s) Topical two times a day  QUEtiapine 25 milliGRAM(s) Oral at bedtime    MEDICATIONS  (PRN):  acetaminophen     Tablet .. 650 milliGRAM(s) Oral every 6 hours PRN Temp greater or equal to 38C (100.4F), Mild Pain (1 - 3)  dextrose Oral Gel 15 Gram(s) Oral once PRN Blood Glucose LESS THAN 70 milliGRAM(s)/deciliter  dextrose Oral Gel 15 Gram(s) Oral once PRN Blood Glucose LESS THAN 70 milliGRAM(s)/deciliter  melatonin 3 milliGRAM(s) Oral at bedtime PRN Insomnia  traMADol 25 milliGRAM(s) Oral every 6 hours PRN Moderate Pain (4 - 6)    CAPILLARY BLOOD GLUCOSE      POCT Blood Glucose.: 223 mg/dL (07 Dec 2023 12:19)  POCT Blood Glucose.: 176 mg/dL (07 Dec 2023 08:18)  POCT Blood Glucose.: 195 mg/dL (06 Dec 2023 22:12)  POCT Blood Glucose.: 201 mg/dL (06 Dec 2023 17:21)    I&O's Summary      PHYSICAL EXAM:  Vital Signs Last 24 Hrs  T(C): 36.6 (07 Dec 2023 05:00), Max: 36.6 (07 Dec 2023 05:00)  T(F): 97.8 (07 Dec 2023 05:00), Max: 97.8 (07 Dec 2023 05:00)  HR: 84 (07 Dec 2023 05:00) (84 - 85)  BP: 124/71 (07 Dec 2023 05:00) (117/55 - 124/71)  BP(mean): --  RR: 20 (07 Dec 2023 05:00) (18 - 20)  SpO2: 99% (07 Dec 2023 05:00) (99% - 100%)    Parameters below as of 06 Dec 2023 15:43  Patient On (Oxygen Delivery Method): room air      CONSTITUTIONAL: NAD,   ENMT: Moist oral mucosa, no pharyngeal injection or exudates;   RESPIRATORY: Normal respiratory effort; lungs are clear to auscultation bilaterally  CARDIOVASCULAR: Regular rate and rhythm, normal S1 and S2,;b/l  lower extremity edema; elephantiasis , wounds   ABDOMEN: Nontender to palpation, normoactive bowel sounds, no rebound/guarding;   PSYCH: A+O to person, place, and time; affect appropriate  NEUROLOGY: CN 2-12 are intact and symmetric; no gross sensory deficits   SKIN:  as above     LABS:                        7.6    8.68  )-----------( 431      ( 06 Dec 2023 18:50 )             23.1     12-06    141  |  109<H>  |  32<H>  ----------------------------<  145<H>  3.9   |  22  |  1.51<H>    Ca    8.7      06 Dec 2023 18:50  Phos  3.5     12-06  Mg     1.70     12-06    TPro  8.1  /  Alb  2.4<L>  /  TBili  <0.2  /  DBili  x   /  AST  13  /  ALT  18  /  AlkPhos  93  12-05          Urinalysis Basic - ( 06 Dec 2023 18:50 )    Color: x / Appearance: x / SG: x / pH: x  Gluc: 145 mg/dL / Ketone: x  / Bili: x / Urobili: x   Blood: x / Protein: x / Nitrite: x   Leuk Esterase: x / RBC: x / WBC x   Sq Epi: x / Non Sq Epi: x / Bacteria: x        SARS-CoV-2: Detected (11 Nov 2023 06:12)  COVID-19 PCR: Detected (10 Nov 2023 10:27)      RADIOLOGY & ADDITIONAL TESTS:  Imaging from Last 24 Hours:    Electrocardiogram/QTc Interval:    COORDINATION OF CARE:  Care Discussed with Consultants/Other Providers: ACP

## 2023-12-07 NOTE — BH CONSULTATION LIAISON PROGRESS NOTE - NSBHASSESSMENTFT_PSY_ALL_CORE
63F with hx of chronic lymphedema, T2DM, HTN, HLD. comes to [facility] for chronic wheeping lymphodema and left foot bullae. Patient last seen at [facility] in november 2023, DC to rehab, left rehab as she felt others were "mean" due to her leg odor, now homeless.  Tried to stay in a hotel and ambulance was called bringing her to [facility].  currently not working, getting social security, denies being on disability. Has an adult daughter whom she has a mother daughter home with, as well as patient grandson who is 6. Did stay with them for a few days recently,  however had disagreements with daughter and left. Patient denies any past psychiatric hx. Did report seeing a therapist when her parents  when she was a child. no hx of SA. No substance abuse reported.     12/5 - patient not sleeping would consider making qHS seroquel standing as this may help with overall anxiety and insomnia, OK for patient to refuse, not interested in inpt psych hospitalization and no si/hi currently. Per collateral, no SI/HI or acute safety concerns however has chronically poor self care. Patient's mood issues and interpersonal conflicts are chronic in nature, unlikely to carlton with psychiatric hospitalization. Patient refuses voluntary psych admission when offered, does not meet criterion for involuntary psych admission. Amenable to going to rehab if indicated.   12/7: continues to not require PRN medications. Calm. no overt s/s of paranoia on today exam.     PLAN  - observation deferred to primary team  - patient offered psychiatric medication - declined - no Si or Hi, allowing for care  - is agreeable for medication for insomnia  - Begin Seroquel 25mg qhs, OK for patient to refuse  - EKG  - antipsychotics can only be given if qtc < 500   - PRN for mild agitation: Seroquel 25mg q6hrs. PRN for severe agitation Zyprexa 2.5mg q6hrs   - No psych c/i to dc to rehab or other safe dispo  63F with hx of chronic lymphedema, T2DM, HTN, HLD. comes to [facility] for chronic wheeping lymphodema and left foot bullae. Patient last seen at [facility] in november 2023, DC to rehab, left rehab as she felt others were "mean" due to her leg odor, now homeless.  Tried to stay in a hotel and ambulance was called bringing her to [facility].  currently not working, getting social security, denies being on disability. Has an adult daughter whom she has a mother daughter home with, as well as patient grandson who is 6. Did stay with them for a few days recently,  however had disagreements with daughter and left. Patient denies any past psychiatric hx. Did report seeing a therapist when her parents  when she was a child. no hx of SA. No substance abuse reported.     12/5 - patient not sleeping would consider making qHS seroquel standing as this may help with overall anxiety and insomnia, OK for patient to refuse, not interested in inpt psych hospitalization and no si/hi currently. Per collateral, no SI/HI or acute safety concerns however has chronically poor self care. Patient's mood issues and interpersonal conflicts are chronic in nature, unlikely to carlton with psychiatric hospitalization. Patient refuses voluntary psych admission when offered, does not meet criterion for involuntary psych admission. Amenable to going to rehab if indicated.   12/7: continues to not require PRN medications. Calm. no overt s/s of paranoia on today exam.     PLAN  - observation deferred to primary team  - patient offered psychiatric medication - declined - no Si or Hi, allowing for care  - is agreeable for medication for insomnia  - Begin Seroquel 25mg qhs, OK for patient to refuse  - EKG  - antipsychotics can only be given if qtc < 500   - PRN for mild agitation: Seroquel 25mg q6hrs. PRN for severe agitation Zyprexa 2.5mg q6hrs   - No psych c/i to dc to rehab or other safe dispo

## 2023-12-07 NOTE — BH CONSULTATION LIAISON PROGRESS NOTE - NSBHCHARTREVIEWVS_PSY_A_CORE FT
Vital Signs Last 24 Hrs  T(C): 36.8 (05 Dec 2023 13:23), Max: 36.8 (05 Dec 2023 13:23)  T(F): 98.3 (05 Dec 2023 13:23), Max: 98.3 (05 Dec 2023 13:23)  HR: 84 (05 Dec 2023 13:23) (84 - 93)  BP: 119/72 (05 Dec 2023 13:23) (119/72 - 141/76)  BP(mean): --  RR: 18 (05 Dec 2023 13:23) (18 - 18)  SpO2: 100% (05 Dec 2023 13:23) (100% - 100%)    Parameters below as of 04 Dec 2023 21:09  Patient On (Oxygen Delivery Method): room air    
Vital Signs Last 24 Hrs  T(C): 36.6 (07 Dec 2023 05:00), Max: 36.6 (07 Dec 2023 05:00)  T(F): 97.8 (07 Dec 2023 05:00), Max: 97.8 (07 Dec 2023 05:00)  HR: 84 (07 Dec 2023 05:00) (84 - 85)  BP: 124/71 (07 Dec 2023 05:00) (117/55 - 124/71)  BP(mean): --  RR: 20 (07 Dec 2023 05:00) (18 - 20)  SpO2: 99% (07 Dec 2023 05:00) (99% - 100%)    Parameters below as of 06 Dec 2023 15:43  Patient On (Oxygen Delivery Method): room air

## 2023-12-08 LAB
ANION GAP SERPL CALC-SCNC: 7 MMOL/L — SIGNIFICANT CHANGE UP (ref 7–14)
ANION GAP SERPL CALC-SCNC: 7 MMOL/L — SIGNIFICANT CHANGE UP (ref 7–14)
BUN SERPL-MCNC: 25 MG/DL — HIGH (ref 7–23)
BUN SERPL-MCNC: 25 MG/DL — HIGH (ref 7–23)
CALCIUM SERPL-MCNC: 9.1 MG/DL — SIGNIFICANT CHANGE UP (ref 8.4–10.5)
CALCIUM SERPL-MCNC: 9.1 MG/DL — SIGNIFICANT CHANGE UP (ref 8.4–10.5)
CHLORIDE SERPL-SCNC: 107 MMOL/L — SIGNIFICANT CHANGE UP (ref 98–107)
CHLORIDE SERPL-SCNC: 107 MMOL/L — SIGNIFICANT CHANGE UP (ref 98–107)
CO2 SERPL-SCNC: 25 MMOL/L — SIGNIFICANT CHANGE UP (ref 22–31)
CO2 SERPL-SCNC: 25 MMOL/L — SIGNIFICANT CHANGE UP (ref 22–31)
CREAT SERPL-MCNC: 1.27 MG/DL — SIGNIFICANT CHANGE UP (ref 0.5–1.3)
CREAT SERPL-MCNC: 1.27 MG/DL — SIGNIFICANT CHANGE UP (ref 0.5–1.3)
EGFR: 48 ML/MIN/1.73M2 — LOW
EGFR: 48 ML/MIN/1.73M2 — LOW
GLUCOSE BLDC GLUCOMTR-MCNC: 146 MG/DL — HIGH (ref 70–99)
GLUCOSE BLDC GLUCOMTR-MCNC: 146 MG/DL — HIGH (ref 70–99)
GLUCOSE BLDC GLUCOMTR-MCNC: 171 MG/DL — HIGH (ref 70–99)
GLUCOSE BLDC GLUCOMTR-MCNC: 171 MG/DL — HIGH (ref 70–99)
GLUCOSE BLDC GLUCOMTR-MCNC: 199 MG/DL — HIGH (ref 70–99)
GLUCOSE BLDC GLUCOMTR-MCNC: 199 MG/DL — HIGH (ref 70–99)
GLUCOSE BLDC GLUCOMTR-MCNC: 216 MG/DL — HIGH (ref 70–99)
GLUCOSE BLDC GLUCOMTR-MCNC: 216 MG/DL — HIGH (ref 70–99)
GLUCOSE SERPL-MCNC: 123 MG/DL — HIGH (ref 70–99)
GLUCOSE SERPL-MCNC: 123 MG/DL — HIGH (ref 70–99)
HCT VFR BLD CALC: 23.5 % — LOW (ref 34.5–45)
HCT VFR BLD CALC: 23.5 % — LOW (ref 34.5–45)
HGB BLD-MCNC: 7.4 G/DL — LOW (ref 11.5–15.5)
HGB BLD-MCNC: 7.4 G/DL — LOW (ref 11.5–15.5)
MAGNESIUM SERPL-MCNC: 1.4 MG/DL — LOW (ref 1.6–2.6)
MAGNESIUM SERPL-MCNC: 1.4 MG/DL — LOW (ref 1.6–2.6)
MCHC RBC-ENTMCNC: 21.4 PG — LOW (ref 27–34)
MCHC RBC-ENTMCNC: 21.4 PG — LOW (ref 27–34)
MCHC RBC-ENTMCNC: 31.5 GM/DL — LOW (ref 32–36)
MCHC RBC-ENTMCNC: 31.5 GM/DL — LOW (ref 32–36)
MCV RBC AUTO: 68.1 FL — LOW (ref 80–100)
MCV RBC AUTO: 68.1 FL — LOW (ref 80–100)
NRBC # BLD: 0 /100 WBCS — SIGNIFICANT CHANGE UP (ref 0–0)
NRBC # BLD: 0 /100 WBCS — SIGNIFICANT CHANGE UP (ref 0–0)
NRBC # FLD: 0 K/UL — SIGNIFICANT CHANGE UP (ref 0–0)
NRBC # FLD: 0 K/UL — SIGNIFICANT CHANGE UP (ref 0–0)
PHOSPHATE SERPL-MCNC: 3.9 MG/DL — SIGNIFICANT CHANGE UP (ref 2.5–4.5)
PHOSPHATE SERPL-MCNC: 3.9 MG/DL — SIGNIFICANT CHANGE UP (ref 2.5–4.5)
PLATELET # BLD AUTO: 402 K/UL — HIGH (ref 150–400)
PLATELET # BLD AUTO: 402 K/UL — HIGH (ref 150–400)
POTASSIUM SERPL-MCNC: 3.9 MMOL/L — SIGNIFICANT CHANGE UP (ref 3.5–5.3)
POTASSIUM SERPL-MCNC: 3.9 MMOL/L — SIGNIFICANT CHANGE UP (ref 3.5–5.3)
POTASSIUM SERPL-SCNC: 3.9 MMOL/L — SIGNIFICANT CHANGE UP (ref 3.5–5.3)
POTASSIUM SERPL-SCNC: 3.9 MMOL/L — SIGNIFICANT CHANGE UP (ref 3.5–5.3)
RBC # BLD: 3.45 M/UL — LOW (ref 3.8–5.2)
RBC # BLD: 3.45 M/UL — LOW (ref 3.8–5.2)
RBC # FLD: 20.2 % — HIGH (ref 10.3–14.5)
RBC # FLD: 20.2 % — HIGH (ref 10.3–14.5)
SODIUM SERPL-SCNC: 139 MMOL/L — SIGNIFICANT CHANGE UP (ref 135–145)
SODIUM SERPL-SCNC: 139 MMOL/L — SIGNIFICANT CHANGE UP (ref 135–145)
VANCOMYCIN FLD-MCNC: 14.3 UG/ML — SIGNIFICANT CHANGE UP
VANCOMYCIN FLD-MCNC: 14.3 UG/ML — SIGNIFICANT CHANGE UP
WBC # BLD: 8.11 K/UL — SIGNIFICANT CHANGE UP (ref 3.8–10.5)
WBC # BLD: 8.11 K/UL — SIGNIFICANT CHANGE UP (ref 3.8–10.5)
WBC # FLD AUTO: 8.11 K/UL — SIGNIFICANT CHANGE UP (ref 3.8–10.5)
WBC # FLD AUTO: 8.11 K/UL — SIGNIFICANT CHANGE UP (ref 3.8–10.5)

## 2023-12-08 PROCEDURE — 99232 SBSQ HOSP IP/OBS MODERATE 35: CPT

## 2023-12-08 RX ORDER — MAGNESIUM SULFATE 500 MG/ML
1 VIAL (ML) INJECTION ONCE
Refills: 0 | Status: COMPLETED | OUTPATIENT
Start: 2023-12-08 | End: 2023-12-08

## 2023-12-08 RX ADMIN — CHLORHEXIDINE GLUCONATE 1 APPLICATION(S): 213 SOLUTION TOPICAL at 11:57

## 2023-12-08 RX ADMIN — Medication 1: at 08:57

## 2023-12-08 RX ADMIN — Medication 2: at 17:19

## 2023-12-08 RX ADMIN — HEPARIN SODIUM 5000 UNIT(S): 5000 INJECTION INTRAVENOUS; SUBCUTANEOUS at 22:44

## 2023-12-08 RX ADMIN — CEFEPIME 100 MILLIGRAM(S): 1 INJECTION, POWDER, FOR SOLUTION INTRAMUSCULAR; INTRAVENOUS at 05:19

## 2023-12-08 RX ADMIN — Medication 1 TABLET(S): at 11:15

## 2023-12-08 RX ADMIN — Medication 100 GRAM(S): at 18:50

## 2023-12-08 RX ADMIN — MUPIROCIN 1 APPLICATION(S): 20 OINTMENT TOPICAL at 18:31

## 2023-12-08 RX ADMIN — HEPARIN SODIUM 5000 UNIT(S): 5000 INJECTION INTRAVENOUS; SUBCUTANEOUS at 05:19

## 2023-12-08 RX ADMIN — TRAMADOL HYDROCHLORIDE 25 MILLIGRAM(S): 50 TABLET ORAL at 12:14

## 2023-12-08 RX ADMIN — TRAMADOL HYDROCHLORIDE 25 MILLIGRAM(S): 50 TABLET ORAL at 11:14

## 2023-12-08 RX ADMIN — TRAMADOL HYDROCHLORIDE 25 MILLIGRAM(S): 50 TABLET ORAL at 22:44

## 2023-12-08 RX ADMIN — TRAMADOL HYDROCHLORIDE 25 MILLIGRAM(S): 50 TABLET ORAL at 04:53

## 2023-12-08 RX ADMIN — ATORVASTATIN CALCIUM 40 MILLIGRAM(S): 80 TABLET, FILM COATED ORAL at 22:44

## 2023-12-08 RX ADMIN — HEPARIN SODIUM 5000 UNIT(S): 5000 INJECTION INTRAVENOUS; SUBCUTANEOUS at 13:24

## 2023-12-08 RX ADMIN — MUPIROCIN 1 APPLICATION(S): 20 OINTMENT TOPICAL at 05:19

## 2023-12-08 RX ADMIN — Medication 2000 UNIT(S): at 11:14

## 2023-12-08 RX ADMIN — CEFEPIME 100 MILLIGRAM(S): 1 INJECTION, POWDER, FOR SOLUTION INTRAMUSCULAR; INTRAVENOUS at 18:10

## 2023-12-08 RX ADMIN — QUETIAPINE FUMARATE 25 MILLIGRAM(S): 200 TABLET, FILM COATED ORAL at 22:44

## 2023-12-08 NOTE — PROGRESS NOTE ADULT - PROBLEM SELECTOR PLAN 1
-pt p/w leukocytosis, tachy with multiple potential sources of infection -skin/soft tissue, OM, UTI, PNA on admission   -treat  with vanc (by level) and cefepime   -f/u MRI of left foot to r/o OM of 5th digit   f/u blood and urine cultures grew E coli  -f/u MRSA swab  - reconsult podiatry if MRI findings c/f OM  - ID consulted, appreciate recs  -f/w wound consult recs   Doppler b/l LE r/o DVT negative

## 2023-12-08 NOTE — PROGRESS NOTE ADULT - SUBJECTIVE AND OBJECTIVE BOX
Medicine Progress Note    Patient is a 63y old  Female who presents with a chief complaint of le edema/pain (08 Dec 2023 11:35)      SUBJECTIVE / OVERNIGHT EVENTS: patient needs MRI LLE , agrees to be done   DC planning complex         MEDICATIONS  (STANDING):  atorvastatin 40 milliGRAM(s) Oral at bedtime  cefepime   IVPB 2000 milliGRAM(s) IV Intermittent two times a day  chlorhexidine 2% Cloths 1 Application(s) Topical daily  cholecalciferol 2000 Unit(s) Oral daily  dextrose 50% Injectable 25 Gram(s) IV Push once  dextrose 50% Injectable 25 Gram(s) IV Push once  dextrose 50% Injectable 12.5 Gram(s) IV Push once  glucagon  Injectable 1 milliGRAM(s) IntraMuscular once  glucagon  Injectable 1 milliGRAM(s) IntraMuscular once  heparin   Injectable 5000 Unit(s) SubCutaneous every 8 hours  influenza   Vaccine 0.5 milliLiter(s) IntraMuscular once  insulin lispro (ADMELOG) corrective regimen sliding scale   SubCutaneous three times a day before meals  insulin lispro (ADMELOG) corrective regimen sliding scale   SubCutaneous at bedtime  multivitamin 1 Tablet(s) Oral daily  mupirocin 2% Ointment 1 Application(s) Topical two times a day  QUEtiapine 25 milliGRAM(s) Oral at bedtime  vancomycin  IVPB 1250 milliGRAM(s) IV Intermittent every 24 hours    MEDICATIONS  (PRN):  acetaminophen     Tablet .. 650 milliGRAM(s) Oral every 6 hours PRN Temp greater or equal to 38C (100.4F), Mild Pain (1 - 3)  dextrose Oral Gel 15 Gram(s) Oral once PRN Blood Glucose LESS THAN 70 milliGRAM(s)/deciliter  dextrose Oral Gel 15 Gram(s) Oral once PRN Blood Glucose LESS THAN 70 milliGRAM(s)/deciliter  melatonin 3 milliGRAM(s) Oral at bedtime PRN Insomnia  traMADol 25 milliGRAM(s) Oral every 6 hours PRN Moderate Pain (4 - 6)    CAPILLARY BLOOD GLUCOSE      POCT Blood Glucose.: 146 mg/dL (08 Dec 2023 12:15)  POCT Blood Glucose.: 171 mg/dL (08 Dec 2023 08:18)  POCT Blood Glucose.: 147 mg/dL (07 Dec 2023 22:20)  POCT Blood Glucose.: 200 mg/dL (07 Dec 2023 17:36)    I&O's Summary      PHYSICAL EXAM:  Vital Signs Last 24 Hrs  T(C): 36.7 (07 Dec 2023 22:19), Max: 36.7 (07 Dec 2023 22:19)  T(F): 98.1 (07 Dec 2023 22:19), Max: 98.1 (07 Dec 2023 22:19)  HR: 86 (07 Dec 2023 22:19) (86 - 86)  BP: 125/71 (07 Dec 2023 22:19) (125/71 - 127/75)  BP(mean): --  RR: 20 (07 Dec 2023 22:19) (20 - 20)  SpO2: 100% (07 Dec 2023 22:19) (100% - 100%)      CONSTITUTIONAL: NAD  ENMT: Moist oral mucosa, no pharyngeal injection or exudates;   RESPIRATORY: Normal respiratory effort; lungs are clear to auscultation bilaterally  CARDIOVASCULAR: Regular rate and rhythm, normal S1 and S2, ; B/L  lower extremity edema/ elephantiasis, wounds   ABDOMEN: Nontender to palpation, normoactive bowel sounds, no rebound/guarding;   PSYCH: A+O to person, place, and time; affect appropriate  NEUROLOGY: CN 2-12 are intact and symmetric; no gross sensory deficits   SKIN: No rashes; no palpable lesions    LABS:                        7.6    8.68  )-----------( 431      ( 06 Dec 2023 18:50 )             23.1     12-06    141  |  109<H>  |  32<H>  ----------------------------<  145<H>  3.9   |  22  |  1.51<H>    Ca    8.7      06 Dec 2023 18:50  Phos  3.5     12-06  Mg     1.70     12-06            Urinalysis Basic - ( 06 Dec 2023 18:50 )    Color: x / Appearance: x / SG: x / pH: x  Gluc: 145 mg/dL / Ketone: x  / Bili: x / Urobili: x   Blood: x / Protein: x / Nitrite: x   Leuk Esterase: x / RBC: x / WBC x   Sq Epi: x / Non Sq Epi: x / Bacteria: x        SARS-CoV-2: Detected (11 Nov 2023 06:12)  COVID-19 PCR: Detected (10 Nov 2023 10:27)      RADIOLOGY & ADDITIONAL TESTS:  Imaging from Last 24 Hours:    Electrocardiogram/QTc Interval:    COORDINATION OF CARE:  Care Discussed with Consultants/Other Providers: ACP

## 2023-12-08 NOTE — PROGRESS NOTE ADULT - ASSESSMENT
This is a 62 y/o F w/ PMHx of DM, HTN, HLD, chronic b/l lymphedema who presents to [facility] on 12/2 for LE wounds with malodorous discharge that has been worsening. Also w/ dysuria.  Pt was briefly lethargic/hypotensive, started on BiPAP but quickly discontinued. Pt was afebrile, WBC 14.8.   Seen by podiatry for L foot bullae, no findings of active infection. L foot XR 5th MT head possible erosion. U/A with 84 WBCs.   ID now consulted for leukocytosis in the setting of LE drainage, positive U/A and L foot XR w/ 5th MT erosion     #LE lymphedema, with malodorous discharge  #Sepsis  #UTI  #L fifth metatarsal lucency seen on XR, c/f OM    Overall leukocytosis possible to SSTI, OM, UTI. Would continue with empiric coverage with Vancomycin/Cefepime, f/u UCx. Obtain imaging of LLE. WBC improved w/ empiric Vancomycin, Cefepime, wounds on RLE, purulent/malodorous drainage.     Plan:   1. C/w Vancomycin 1250 mg q24, Cefepime 2 g q12. Will plan on 10 day course for SSTI, ending on 12/11/23  2. Can d/c MRI tib/fib, pending MRI of L foot, per ACP, pt has been refusing. Per podiatry low c/f for infection from foot.   If patient continues to defer MRI, can cancel    Thank you for this consult. Inpatient ID team will follow.    Jose Alberto Tomas M.D.  Attending Physician  Division of Infectious Diseases  Department of Medicine    Please contact through MS Teams message.  Office: [phone] (after 5 PM or weekend) This is a 62 y/o F w/ PMHx of DM, HTN, HLD, chronic b/l lymphedema who presents to [facility] on 12/2 for LE wounds with malodorous discharge that has been worsening. Also w/ dysuria.  Pt was briefly lethargic/hypotensive, started on BiPAP but quickly discontinued. Pt was afebrile, WBC 14.8.   Seen by podiatry for L foot bullae, no findings of active infection. L foot XR 5th MT head possible erosion. U/A with 84 WBCs.   ID now consulted for leukocytosis in the setting of LE drainage, positive U/A and L foot XR w/ 5th MT erosion     #LE lymphedema, with malodorous discharge  #Sepsis  #UTI  #L fifth metatarsal lucency seen on XR, c/f OM    Overall leukocytosis possible to SSTI, OM, UTI. Would continue with empiric coverage with Vancomycin/Cefepime, f/u UCx. Obtain imaging of LLE. WBC improved w/ empiric Vancomycin, Cefepime, wounds on RLE, purulent/malodorous drainage.     Plan:   1. C/w Vancomycin 1250 mg q24, Cefepime 2 g q12. Will plan on 10 day course for SSTI, ending on 12/11/23  2. Can d/c MRI tib/fib, pending MRI of L foot, per ACP, pt has been refusing. Per podiatry low c/f for infection from foot.   If patient continues to defer MRI, can cancel    Thank you for this consult. Inpatient ID team will follow.    Jose Alberto Tomas M.D.  Attending Physician  Division of Infectious Diseases  Department of Medicine    Please contact through MS Teams message.  Office: [phone] (after 5 PM or weekend)

## 2023-12-08 NOTE — PROGRESS NOTE ADULT - SUBJECTIVE AND OBJECTIVE BOX
Infectious Diseases Follow Up:    Patient is a 63y old  Female who presents with a chief complaint of le edema/pain (07 Dec 2023 13:16)      Interval History/ROS:  No acute changes, still w/ leg pain    Allergies  No Known Allergies        ANTIMICROBIALS:  cefepime   IVPB 2000 two times a day  vancomycin  IVPB 1250 every 24 hours      Current Abx:     Previous Abx     OTHER MEDS:  MEDICATIONS  (STANDING):  acetaminophen     Tablet .. 650 every 6 hours PRN  atorvastatin 40 at bedtime  dextrose 50% Injectable 25 once  dextrose 50% Injectable 25 once  dextrose 50% Injectable 12.5 once  dextrose Oral Gel 15 once PRN  dextrose Oral Gel 15 once PRN  glucagon  Injectable 1 once  glucagon  Injectable 1 once  heparin   Injectable 5000 every 8 hours  influenza   Vaccine 0.5 once  insulin lispro (ADMELOG) corrective regimen sliding scale  three times a day before meals  insulin lispro (ADMELOG) corrective regimen sliding scale  at bedtime  melatonin 3 at bedtime PRN  QUEtiapine 25 at bedtime  traMADol 25 every 6 hours PRN      Vital Signs Last 24 Hrs  T(C): 36.7 (07 Dec 2023 22:19), Max: 36.7 (07 Dec 2023 22:19)  T(F): 98.1 (07 Dec 2023 22:19), Max: 98.1 (07 Dec 2023 22:19)  HR: 86 (07 Dec 2023 22:19) (86 - 86)  BP: 125/71 (07 Dec 2023 22:19) (125/71 - 127/75)  BP(mean): --  RR: 20 (07 Dec 2023 22:19) (20 - 20)  SpO2: 100% (07 Dec 2023 22:19) (100% - 100%)      PHYSICAL EXAM:  GENERAL: NAD, well-developed  HEAD:  Atraumatic, Normocephalic  EYES: EOMI, PERRLA, conjunctiva and sclera clear  NECK: Supple, No JVD  CHEST/LUNG: Clear to auscultation bilaterally; No wheeze  HEART: Regular rate and rhythm; No murmurs, rubs, or gallops  ABDOMEN: Soft, Nontender, Nondistended; Bowel sounds present  EXTREMITIES:  B/l LE dressed   PSYCH: AAOx3                          7.6    8.68  )-----------( 431      ( 06 Dec 2023 18:50 )             23.1       12-06    141  |  109<H>  |  32<H>  ----------------------------<  145<H>  3.9   |  22  |  1.51<H>    Ca    8.7      06 Dec 2023 18:50  Phos  3.5     12-06  Mg     1.70     12-06        Urinalysis Basic - ( 06 Dec 2023 18:50 )    Color: x / Appearance: x / SG: x / pH: x  Gluc: 145 mg/dL / Ketone: x  / Bili: x / Urobili: x   Blood: x / Protein: x / Nitrite: x   Leuk Esterase: x / RBC: x / WBC x   Sq Epi: x / Non Sq Epi: x / Bacteria: x        MICROBIOLOGY:  v  .Blood Blood-Peripheral  12-03-23   No growth at 4 days  --  --      .Blood Blood-Peripheral  12-03-23   No growth at 4 days  --  --      Clean Catch Clean Catch (Midstream)  12-02-23   >100,000 CFU/ml Escherichia coli  --  Escherichia coli                RADIOLOGY:

## 2023-12-09 LAB
CULTURE RESULTS: SIGNIFICANT CHANGE UP
GLUCOSE BLDC GLUCOMTR-MCNC: 145 MG/DL — HIGH (ref 70–99)
GLUCOSE BLDC GLUCOMTR-MCNC: 145 MG/DL — HIGH (ref 70–99)
GLUCOSE BLDC GLUCOMTR-MCNC: 154 MG/DL — HIGH (ref 70–99)
GLUCOSE BLDC GLUCOMTR-MCNC: 154 MG/DL — HIGH (ref 70–99)
GLUCOSE BLDC GLUCOMTR-MCNC: 182 MG/DL — HIGH (ref 70–99)
GLUCOSE BLDC GLUCOMTR-MCNC: 182 MG/DL — HIGH (ref 70–99)
GLUCOSE BLDC GLUCOMTR-MCNC: 185 MG/DL — HIGH (ref 70–99)
GLUCOSE BLDC GLUCOMTR-MCNC: 185 MG/DL — HIGH (ref 70–99)
GLUCOSE BLDC GLUCOMTR-MCNC: 193 MG/DL — HIGH (ref 70–99)
GLUCOSE BLDC GLUCOMTR-MCNC: 193 MG/DL — HIGH (ref 70–99)
SPECIMEN SOURCE: SIGNIFICANT CHANGE UP

## 2023-12-09 PROCEDURE — 99232 SBSQ HOSP IP/OBS MODERATE 35: CPT

## 2023-12-09 RX ADMIN — Medication 2000 UNIT(S): at 12:18

## 2023-12-09 RX ADMIN — Medication 1: at 12:17

## 2023-12-09 RX ADMIN — HEPARIN SODIUM 5000 UNIT(S): 5000 INJECTION INTRAVENOUS; SUBCUTANEOUS at 21:42

## 2023-12-09 RX ADMIN — TRAMADOL HYDROCHLORIDE 25 MILLIGRAM(S): 50 TABLET ORAL at 21:40

## 2023-12-09 RX ADMIN — Medication 1 TABLET(S): at 12:18

## 2023-12-09 RX ADMIN — HEPARIN SODIUM 5000 UNIT(S): 5000 INJECTION INTRAVENOUS; SUBCUTANEOUS at 05:37

## 2023-12-09 RX ADMIN — CHLORHEXIDINE GLUCONATE 1 APPLICATION(S): 213 SOLUTION TOPICAL at 12:19

## 2023-12-09 RX ADMIN — Medication 166.67 MILLIGRAM(S): at 22:30

## 2023-12-09 RX ADMIN — HEPARIN SODIUM 5000 UNIT(S): 5000 INJECTION INTRAVENOUS; SUBCUTANEOUS at 13:48

## 2023-12-09 RX ADMIN — Medication 1: at 08:34

## 2023-12-09 RX ADMIN — QUETIAPINE FUMARATE 25 MILLIGRAM(S): 200 TABLET, FILM COATED ORAL at 21:40

## 2023-12-09 RX ADMIN — CEFEPIME 100 MILLIGRAM(S): 1 INJECTION, POWDER, FOR SOLUTION INTRAMUSCULAR; INTRAVENOUS at 05:34

## 2023-12-09 RX ADMIN — CEFEPIME 100 MILLIGRAM(S): 1 INJECTION, POWDER, FOR SOLUTION INTRAMUSCULAR; INTRAVENOUS at 17:38

## 2023-12-09 RX ADMIN — ATORVASTATIN CALCIUM 40 MILLIGRAM(S): 80 TABLET, FILM COATED ORAL at 21:40

## 2023-12-09 RX ADMIN — Medication 166.67 MILLIGRAM(S): at 00:07

## 2023-12-09 NOTE — PROGRESS NOTE ADULT - SUBJECTIVE AND OBJECTIVE BOX
Medicine Progress Note    Patient is a 63y old  Female who presents with a chief complaint of le edema/pain (08 Dec 2023 14:00)      SUBJECTIVE / OVERNIGHT EVENTS: no events       MEDICATIONS  (STANDING):  atorvastatin 40 milliGRAM(s) Oral at bedtime  cefepime   IVPB 2000 milliGRAM(s) IV Intermittent two times a day  chlorhexidine 2% Cloths 1 Application(s) Topical daily  cholecalciferol 2000 Unit(s) Oral daily  dextrose 50% Injectable 25 Gram(s) IV Push once  dextrose 50% Injectable 25 Gram(s) IV Push once  dextrose 50% Injectable 12.5 Gram(s) IV Push once  glucagon  Injectable 1 milliGRAM(s) IntraMuscular once  glucagon  Injectable 1 milliGRAM(s) IntraMuscular once  heparin   Injectable 5000 Unit(s) SubCutaneous every 8 hours  influenza   Vaccine 0.5 milliLiter(s) IntraMuscular once  insulin lispro (ADMELOG) corrective regimen sliding scale   SubCutaneous at bedtime  insulin lispro (ADMELOG) corrective regimen sliding scale   SubCutaneous three times a day before meals  multivitamin 1 Tablet(s) Oral daily  mupirocin 2% Ointment 1 Application(s) Topical two times a day  QUEtiapine 25 milliGRAM(s) Oral at bedtime  vancomycin  IVPB 1250 milliGRAM(s) IV Intermittent every 24 hours    MEDICATIONS  (PRN):  acetaminophen     Tablet .. 650 milliGRAM(s) Oral every 6 hours PRN Temp greater or equal to 38C (100.4F), Mild Pain (1 - 3)  aluminum hydroxide/magnesium hydroxide/simethicone Suspension 30 milliLiter(s) Oral every 4 hours PRN Dyspepsia  dextrose Oral Gel 15 Gram(s) Oral once PRN Blood Glucose LESS THAN 70 milliGRAM(s)/deciliter  dextrose Oral Gel 15 Gram(s) Oral once PRN Blood Glucose LESS THAN 70 milliGRAM(s)/deciliter  melatonin 3 milliGRAM(s) Oral at bedtime PRN Insomnia  traMADol 25 milliGRAM(s) Oral every 6 hours PRN Moderate Pain (4 - 6)    CAPILLARY BLOOD GLUCOSE      POCT Blood Glucose.: 145 mg/dL (09 Dec 2023 17:23)  POCT Blood Glucose.: 185 mg/dL (09 Dec 2023 12:13)  POCT Blood Glucose.: 154 mg/dL (09 Dec 2023 08:13)  POCT Blood Glucose.: 199 mg/dL (08 Dec 2023 22:22)    I&O's Summary      PHYSICAL EXAM:  Vital Signs Last 24 Hrs  T(C): 37.1 (09 Dec 2023 19:23), Max: 37.1 (09 Dec 2023 19:23)  T(F): 98.7 (09 Dec 2023 19:23), Max: 98.7 (09 Dec 2023 19:23)  HR: 81 (09 Dec 2023 19:23) (79 - 81)  BP: 136/71 (09 Dec 2023 19:23) (119/54 - 136/71)  BP(mean): --  RR: 18 (09 Dec 2023 19:23) (18 - 18)  SpO2: 100% (09 Dec 2023 19:23) (100% - 100%)    Parameters below as of 09 Dec 2023 19:23  Patient On (Oxygen Delivery Method): room air      CONSTITUTIONAL: NAD,   ENMT: Moist oral mucosa, no pharyngeal injection or exudates;  RESPIRATORY: Normal respiratory effort; lungs are clear to auscultation bilaterally  CARDIOVASCULAR: Regular rate and rhythm, normal S1 and S2,;b/l  lower extremity edema; wounds   ABDOMEN: Nontender to palpation, normoactive bowel sounds, no rebound/guarding;  PSYCH: A+O to person, place, and time; affect appropriate  NEUROLOGY: CN 2-12 are intact and symmetric; no gross sensory deficits   SKIN: lesions, wounds     LABS:                        7.4    8.11  )-----------( 402      ( 08 Dec 2023 14:55 )             23.5     12-08    139  |  107  |  25<H>  ----------------------------<  123<H>  3.9   |  25  |  1.27    Ca    9.1      08 Dec 2023 14:55  Phos  3.9     12-08  Mg     1.40     12-08            Urinalysis Basic - ( 08 Dec 2023 14:55 )    Color: x / Appearance: x / SG: x / pH: x  Gluc: 123 mg/dL / Ketone: x  / Bili: x / Urobili: x   Blood: x / Protein: x / Nitrite: x   Leuk Esterase: x / RBC: x / WBC x   Sq Epi: x / Non Sq Epi: x / Bacteria: x        SARS-CoV-2: Detected (11 Nov 2023 06:12)  COVID-19 PCR: Detected (10 Nov 2023 10:27)      RADIOLOGY & ADDITIONAL TESTS:  Imaging from Last 24 Hours:    Electrocardiogram/QTc Interval:    COORDINATION OF CARE:  Care Discussed with Consultants/Other Providers: ACP

## 2023-12-10 LAB
ANION GAP SERPL CALC-SCNC: 7 MMOL/L — SIGNIFICANT CHANGE UP (ref 7–14)
ANION GAP SERPL CALC-SCNC: 7 MMOL/L — SIGNIFICANT CHANGE UP (ref 7–14)
BUN SERPL-MCNC: 21 MG/DL — SIGNIFICANT CHANGE UP (ref 7–23)
BUN SERPL-MCNC: 21 MG/DL — SIGNIFICANT CHANGE UP (ref 7–23)
CALCIUM SERPL-MCNC: 8.7 MG/DL — SIGNIFICANT CHANGE UP (ref 8.4–10.5)
CALCIUM SERPL-MCNC: 8.7 MG/DL — SIGNIFICANT CHANGE UP (ref 8.4–10.5)
CHLORIDE SERPL-SCNC: 106 MMOL/L — SIGNIFICANT CHANGE UP (ref 98–107)
CHLORIDE SERPL-SCNC: 106 MMOL/L — SIGNIFICANT CHANGE UP (ref 98–107)
CO2 SERPL-SCNC: 25 MMOL/L — SIGNIFICANT CHANGE UP (ref 22–31)
CO2 SERPL-SCNC: 25 MMOL/L — SIGNIFICANT CHANGE UP (ref 22–31)
CREAT SERPL-MCNC: 1.21 MG/DL — SIGNIFICANT CHANGE UP (ref 0.5–1.3)
CREAT SERPL-MCNC: 1.21 MG/DL — SIGNIFICANT CHANGE UP (ref 0.5–1.3)
EGFR: 51 ML/MIN/1.73M2 — LOW
EGFR: 51 ML/MIN/1.73M2 — LOW
GLUCOSE BLDC GLUCOMTR-MCNC: 139 MG/DL — HIGH (ref 70–99)
GLUCOSE BLDC GLUCOMTR-MCNC: 139 MG/DL — HIGH (ref 70–99)
GLUCOSE BLDC GLUCOMTR-MCNC: 151 MG/DL — HIGH (ref 70–99)
GLUCOSE BLDC GLUCOMTR-MCNC: 151 MG/DL — HIGH (ref 70–99)
GLUCOSE BLDC GLUCOMTR-MCNC: 157 MG/DL — HIGH (ref 70–99)
GLUCOSE BLDC GLUCOMTR-MCNC: 157 MG/DL — HIGH (ref 70–99)
GLUCOSE BLDC GLUCOMTR-MCNC: 176 MG/DL — HIGH (ref 70–99)
GLUCOSE BLDC GLUCOMTR-MCNC: 176 MG/DL — HIGH (ref 70–99)
GLUCOSE SERPL-MCNC: 157 MG/DL — HIGH (ref 70–99)
GLUCOSE SERPL-MCNC: 157 MG/DL — HIGH (ref 70–99)
HCT VFR BLD CALC: 22.7 % — LOW (ref 34.5–45)
HCT VFR BLD CALC: 22.7 % — LOW (ref 34.5–45)
HGB BLD-MCNC: 7.3 G/DL — LOW (ref 11.5–15.5)
HGB BLD-MCNC: 7.3 G/DL — LOW (ref 11.5–15.5)
MAGNESIUM SERPL-MCNC: 1.4 MG/DL — LOW (ref 1.6–2.6)
MAGNESIUM SERPL-MCNC: 1.4 MG/DL — LOW (ref 1.6–2.6)
MCHC RBC-ENTMCNC: 21.8 PG — LOW (ref 27–34)
MCHC RBC-ENTMCNC: 21.8 PG — LOW (ref 27–34)
MCHC RBC-ENTMCNC: 32.2 GM/DL — SIGNIFICANT CHANGE UP (ref 32–36)
MCHC RBC-ENTMCNC: 32.2 GM/DL — SIGNIFICANT CHANGE UP (ref 32–36)
MCV RBC AUTO: 67.8 FL — LOW (ref 80–100)
MCV RBC AUTO: 67.8 FL — LOW (ref 80–100)
NRBC # BLD: 0 /100 WBCS — SIGNIFICANT CHANGE UP (ref 0–0)
NRBC # BLD: 0 /100 WBCS — SIGNIFICANT CHANGE UP (ref 0–0)
NRBC # FLD: 0 K/UL — SIGNIFICANT CHANGE UP (ref 0–0)
NRBC # FLD: 0 K/UL — SIGNIFICANT CHANGE UP (ref 0–0)
PHOSPHATE SERPL-MCNC: 3.5 MG/DL — SIGNIFICANT CHANGE UP (ref 2.5–4.5)
PHOSPHATE SERPL-MCNC: 3.5 MG/DL — SIGNIFICANT CHANGE UP (ref 2.5–4.5)
PLATELET # BLD AUTO: 335 K/UL — SIGNIFICANT CHANGE UP (ref 150–400)
PLATELET # BLD AUTO: 335 K/UL — SIGNIFICANT CHANGE UP (ref 150–400)
POTASSIUM SERPL-MCNC: 3.8 MMOL/L — SIGNIFICANT CHANGE UP (ref 3.5–5.3)
POTASSIUM SERPL-MCNC: 3.8 MMOL/L — SIGNIFICANT CHANGE UP (ref 3.5–5.3)
POTASSIUM SERPL-SCNC: 3.8 MMOL/L — SIGNIFICANT CHANGE UP (ref 3.5–5.3)
POTASSIUM SERPL-SCNC: 3.8 MMOL/L — SIGNIFICANT CHANGE UP (ref 3.5–5.3)
RBC # BLD: 3.35 M/UL — LOW (ref 3.8–5.2)
RBC # BLD: 3.35 M/UL — LOW (ref 3.8–5.2)
RBC # FLD: 20.1 % — HIGH (ref 10.3–14.5)
RBC # FLD: 20.1 % — HIGH (ref 10.3–14.5)
SODIUM SERPL-SCNC: 138 MMOL/L — SIGNIFICANT CHANGE UP (ref 135–145)
SODIUM SERPL-SCNC: 138 MMOL/L — SIGNIFICANT CHANGE UP (ref 135–145)
VANCOMYCIN TROUGH SERPL-MCNC: 14.1 UG/ML — SIGNIFICANT CHANGE UP (ref 10–20)
VANCOMYCIN TROUGH SERPL-MCNC: 14.1 UG/ML — SIGNIFICANT CHANGE UP (ref 10–20)
WBC # BLD: 6.79 K/UL — SIGNIFICANT CHANGE UP (ref 3.8–10.5)
WBC # BLD: 6.79 K/UL — SIGNIFICANT CHANGE UP (ref 3.8–10.5)
WBC # FLD AUTO: 6.79 K/UL — SIGNIFICANT CHANGE UP (ref 3.8–10.5)
WBC # FLD AUTO: 6.79 K/UL — SIGNIFICANT CHANGE UP (ref 3.8–10.5)

## 2023-12-10 PROCEDURE — 99232 SBSQ HOSP IP/OBS MODERATE 35: CPT

## 2023-12-10 RX ORDER — SENNA PLUS 8.6 MG/1
1 TABLET ORAL AT BEDTIME
Refills: 0 | Status: DISCONTINUED | OUTPATIENT
Start: 2023-12-10 | End: 2023-12-18

## 2023-12-10 RX ADMIN — Medication 1: at 17:52

## 2023-12-10 RX ADMIN — TRAMADOL HYDROCHLORIDE 25 MILLIGRAM(S): 50 TABLET ORAL at 19:59

## 2023-12-10 RX ADMIN — SENNA PLUS 1 TABLET(S): 8.6 TABLET ORAL at 21:45

## 2023-12-10 RX ADMIN — CEFEPIME 100 MILLIGRAM(S): 1 INJECTION, POWDER, FOR SOLUTION INTRAMUSCULAR; INTRAVENOUS at 20:02

## 2023-12-10 RX ADMIN — Medication 2000 UNIT(S): at 13:05

## 2023-12-10 RX ADMIN — CHLORHEXIDINE GLUCONATE 1 APPLICATION(S): 213 SOLUTION TOPICAL at 13:05

## 2023-12-10 RX ADMIN — HEPARIN SODIUM 5000 UNIT(S): 5000 INJECTION INTRAVENOUS; SUBCUTANEOUS at 05:35

## 2023-12-10 RX ADMIN — Medication 650 MILLIGRAM(S): at 23:00

## 2023-12-10 RX ADMIN — QUETIAPINE FUMARATE 25 MILLIGRAM(S): 200 TABLET, FILM COATED ORAL at 21:45

## 2023-12-10 RX ADMIN — HEPARIN SODIUM 5000 UNIT(S): 5000 INJECTION INTRAVENOUS; SUBCUTANEOUS at 21:45

## 2023-12-10 RX ADMIN — Medication 1: at 08:47

## 2023-12-10 RX ADMIN — Medication 166.67 MILLIGRAM(S): at 23:50

## 2023-12-10 RX ADMIN — Medication 650 MILLIGRAM(S): at 22:19

## 2023-12-10 RX ADMIN — TRAMADOL HYDROCHLORIDE 25 MILLIGRAM(S): 50 TABLET ORAL at 20:45

## 2023-12-10 RX ADMIN — Medication 1 TABLET(S): at 13:05

## 2023-12-10 RX ADMIN — ATORVASTATIN CALCIUM 40 MILLIGRAM(S): 80 TABLET, FILM COATED ORAL at 21:45

## 2023-12-10 RX ADMIN — HEPARIN SODIUM 5000 UNIT(S): 5000 INJECTION INTRAVENOUS; SUBCUTANEOUS at 13:05

## 2023-12-10 RX ADMIN — CEFEPIME 100 MILLIGRAM(S): 1 INJECTION, POWDER, FOR SOLUTION INTRAMUSCULAR; INTRAVENOUS at 05:34

## 2023-12-10 NOTE — PROGRESS NOTE ADULT - SUBJECTIVE AND OBJECTIVE BOX
Medicine Progress Note    Patient is a 63y old  Female who presents with a chief complaint of Acute renal failure     (10 Dec 2023 10:54)      SUBJECTIVE / OVERNIGHT EVENTS: no events , pending MRI Foot         MEDICATIONS  (STANDING):  atorvastatin 40 milliGRAM(s) Oral at bedtime  cefepime   IVPB 2000 milliGRAM(s) IV Intermittent two times a day  chlorhexidine 2% Cloths 1 Application(s) Topical daily  cholecalciferol 2000 Unit(s) Oral daily  dextrose 50% Injectable 25 Gram(s) IV Push once  dextrose 50% Injectable 25 Gram(s) IV Push once  dextrose 50% Injectable 12.5 Gram(s) IV Push once  glucagon  Injectable 1 milliGRAM(s) IntraMuscular once  glucagon  Injectable 1 milliGRAM(s) IntraMuscular once  heparin   Injectable 5000 Unit(s) SubCutaneous every 8 hours  influenza   Vaccine 0.5 milliLiter(s) IntraMuscular once  insulin lispro (ADMELOG) corrective regimen sliding scale   SubCutaneous three times a day before meals  insulin lispro (ADMELOG) corrective regimen sliding scale   SubCutaneous at bedtime  multivitamin 1 Tablet(s) Oral daily  mupirocin 2% Ointment 1 Application(s) Topical two times a day  QUEtiapine 25 milliGRAM(s) Oral at bedtime  senna 1 Tablet(s) Oral at bedtime  vancomycin  IVPB 1250 milliGRAM(s) IV Intermittent every 24 hours    MEDICATIONS  (PRN):  acetaminophen     Tablet .. 650 milliGRAM(s) Oral every 6 hours PRN Temp greater or equal to 38C (100.4F), Mild Pain (1 - 3)  aluminum hydroxide/magnesium hydroxide/simethicone Suspension 30 milliLiter(s) Oral every 4 hours PRN Dyspepsia  dextrose Oral Gel 15 Gram(s) Oral once PRN Blood Glucose LESS THAN 70 milliGRAM(s)/deciliter  dextrose Oral Gel 15 Gram(s) Oral once PRN Blood Glucose LESS THAN 70 milliGRAM(s)/deciliter  melatonin 3 milliGRAM(s) Oral at bedtime PRN Insomnia  traMADol 25 milliGRAM(s) Oral every 6 hours PRN Moderate Pain (4 - 6)    CAPILLARY BLOOD GLUCOSE      POCT Blood Glucose.: 139 mg/dL (10 Dec 2023 12:36)  POCT Blood Glucose.: 151 mg/dL (10 Dec 2023 08:31)  POCT Blood Glucose.: 193 mg/dL (09 Dec 2023 22:57)  POCT Blood Glucose.: 182 mg/dL (09 Dec 2023 22:08)  POCT Blood Glucose.: 145 mg/dL (09 Dec 2023 17:23)    I&O's Summary      PHYSICAL EXAM:  Vital Signs Last 24 Hrs  T(C): 37 (10 Dec 2023 13:28), Max: 37.1 (09 Dec 2023 19:23)  T(F): 98.6 (10 Dec 2023 13:28), Max: 98.8 (09 Dec 2023 22:18)  HR: 82 (10 Dec 2023 13:28) (70 - 82)  BP: 106/76 (10 Dec 2023 13:28) (106/76 - 136/71)  BP(mean): --  RR: 18 (10 Dec 2023 13:28) (18 - 20)  SpO2: 96% (10 Dec 2023 13:28) (96% - 100%)    Parameters below as of 10 Dec 2023 04:42  Patient On (Oxygen Delivery Method): room air      CONSTITUTIONAL: NAD,  ENMT: Moist oral mucosa, no pharyngeal injection or exudates;   RESPIRATORY: Normal respiratory effort; lungs are clear to auscultation bilaterally  CARDIOVASCULAR: Regular rate and rhythm, normal S1 and S2, ; b/l  lower extremity edema wounds   ABDOMEN: Nontender to palpation, normoactive bowel sounds, no rebound/guarding;   PSYCH: A+O to person, place, and time; affect appropriate  NEUROLOGY: CN 2-12 are intact and symmetric; no gross sensory deficits   SKIN: as above     LABS:                        7.3    6.79  )-----------( 335      ( 10 Dec 2023 06:00 )             22.7     12-10    138  |  106  |  21  ----------------------------<  157<H>  3.8   |  25  |  1.21    Ca    8.7      10 Dec 2023 07:45  Phos  3.5     12-10  Mg     1.40     12-10            Urinalysis Basic - ( 10 Dec 2023 07:45 )    Color: x / Appearance: x / SG: x / pH: x  Gluc: 157 mg/dL / Ketone: x  / Bili: x / Urobili: x   Blood: x / Protein: x / Nitrite: x   Leuk Esterase: x / RBC: x / WBC x   Sq Epi: x / Non Sq Epi: x / Bacteria: x        SARS-CoV-2: Detected (11 Nov 2023 06:12)  COVID-19 PCR: Detected (10 Nov 2023 10:27)      RADIOLOGY & ADDITIONAL TESTS:  Imaging from Last 24 Hours:    Electrocardiogram/QTc Interval:    COORDINATION OF CARE:  Care Discussed with Consultants/Other Providers: ACP

## 2023-12-10 NOTE — DIETITIAN INITIAL EVALUATION ADULT - PERTINENT LABORATORY DATA
12-10    138  |  106  |  21  ----------------------------<  157<H>  3.8   |  25  |  1.21    Ca    8.7      10 Dec 2023 07:45  Phos  3.5     12-10  Mg     1.40     12-10    POCT Blood Glucose.: 139 mg/dL (12-10-23 @ 12:36)  A1C with Estimated Average Glucose Result: 6.9 % (12-03-23 @ 05:27)  A1C with Estimated Average Glucose Result: 6.9 % (11-01-23 @ 08:20)

## 2023-12-10 NOTE — DIETITIAN INITIAL EVALUATION ADULT - ORAL INTAKE PTA/DIET HISTORY
Patient reports poor PO intake over the past few weeks. Denies chewing and swallowing difficulties on regular food items PTA. Denies any specific diet followed PTA.

## 2023-12-10 NOTE — DIETITIAN INITIAL EVALUATION ADULT - SIGNS/SYMPTOMS
Unknown
Pt w/ weight gain of 51lbs x 1 mo, noted with 2+ generalized edema and 3+ rt arm edema. BMI>40

## 2023-12-10 NOTE — DIETITIAN INITIAL EVALUATION ADULT - ADD RECOMMEND
1) Recommend d/c Renal Replacement diet as pt's K & Phos are WNL.  2) Monitor PO intake, Labs, weights, BMs, and skin integrity.   3) RD to remain available for further nutritional interventions as indicated.

## 2023-12-10 NOTE — DIETITIAN INITIAL EVALUATION ADULT - PERTINENT MEDS FT
MEDICATIONS  (STANDING):  atorvastatin 40 milliGRAM(s) Oral at bedtime  cefepime   IVPB 2000 milliGRAM(s) IV Intermittent two times a day  chlorhexidine 2% Cloths 1 Application(s) Topical daily  cholecalciferol 2000 Unit(s) Oral daily  dextrose 50% Injectable 25 Gram(s) IV Push once  dextrose 50% Injectable 25 Gram(s) IV Push once  dextrose 50% Injectable 12.5 Gram(s) IV Push once  glucagon  Injectable 1 milliGRAM(s) IntraMuscular once  glucagon  Injectable 1 milliGRAM(s) IntraMuscular once  heparin   Injectable 5000 Unit(s) SubCutaneous every 8 hours  influenza   Vaccine 0.5 milliLiter(s) IntraMuscular once  insulin lispro (ADMELOG) corrective regimen sliding scale   SubCutaneous three times a day before meals  insulin lispro (ADMELOG) corrective regimen sliding scale   SubCutaneous at bedtime  multivitamin 1 Tablet(s) Oral daily  mupirocin 2% Ointment 1 Application(s) Topical two times a day  QUEtiapine 25 milliGRAM(s) Oral at bedtime  senna 1 Tablet(s) Oral at bedtime  vancomycin  IVPB 1250 milliGRAM(s) IV Intermittent every 24 hours    MEDICATIONS  (PRN):  acetaminophen     Tablet .. 650 milliGRAM(s) Oral every 6 hours PRN Temp greater or equal to 38C (100.4F), Mild Pain (1 - 3)  aluminum hydroxide/magnesium hydroxide/simethicone Suspension 30 milliLiter(s) Oral every 4 hours PRN Dyspepsia  dextrose Oral Gel 15 Gram(s) Oral once PRN Blood Glucose LESS THAN 70 milliGRAM(s)/deciliter  dextrose Oral Gel 15 Gram(s) Oral once PRN Blood Glucose LESS THAN 70 milliGRAM(s)/deciliter  melatonin 3 milliGRAM(s) Oral at bedtime PRN Insomnia  traMADol 25 milliGRAM(s) Oral every 6 hours PRN Moderate Pain (4 - 6)

## 2023-12-10 NOTE — PROVIDER CONTACT NOTE (OTHER) - RECOMMENDATIONS
do not give medication
Educated pt on the importance of monitoring her FS.
Attempt again in the am tomorrow
Stool softener

## 2023-12-10 NOTE — DIETITIAN INITIAL EVALUATION ADULT - NAME AND PHONE
Erica Bragg, [facility] #[id number], also available on [employer] Teams.  Erica Bragg, [facility] #[id number], also available on [employer] Teams.

## 2023-12-10 NOTE — DIETITIAN INITIAL EVALUATION ADULT - OTHER INFO
Per chart review, 63-year-old female with past medical history of DM, HTN, HLD, chronic bilateral lower extremity lymphedema presents by EMS for lower extremity edema with malodorous discharge admitted with sepsis, r/o OM, LE cellulitis, UTI for which ID is consulted. Podiatry consulted, no plan for intervention. Pending MR LINDA foot to evaluate for OM. WC consulted, recs pending. PT recs pending.     Patient seen at bedside. Reports appetite has been slightly better. PO intake varies from % per RN flow sheet. Pt noted on appetite stimulant. Food preferences explored and honored to encourage PO intake. Pt noted currently on Renal Diet. Lab reviewed, pt's K & Phos WNL, recommend d/c Renal Diet, continue with Consistent Carbohydrate/DASH/TLC diet. Pt noted with hypomagnesemia 1.4L, currently receiving magnesium hydroxide for repletion. MVI ordered for micronutrient coverage. Pt's previous weight per HIE 11/8/23 218lbs, most recent adm weight 12/2/23 257.15lbs. Weight trend reflects 51.6lbs weight gain x 1month.  As per RN flow sheet, pt noted with 2+ generalized edema and 3+ rt arm edema which can contributes to weight gain. Pt has no nutrition related questions nor concerns. Provided patient with healthy eating tips. Tips To Support Weight Loss handout provided to patient. RD to remain available for further nutritional interventions as indicated.

## 2023-12-10 NOTE — DIETITIAN INITIAL EVALUATION ADULT - NS FNS REASON FOR WEIGHT CHANG
Pt noted with 2+ generalized edema and 3+ right arm edema which is contributing to weight gain, and masking weight loss./fluid retention

## 2023-12-10 NOTE — DIETITIAN INITIAL EVALUATION ADULT - NSICDXPASTMEDICALHX_GEN_ALL_CORE_FT
Yes PAST MEDICAL HISTORY:  Adult-Onset Obesity     Chronic Acquired Lymphedema     DM (diabetes mellitus)     HTN (hypertension)     Iron Deficiency Anemia

## 2023-12-11 LAB
ANION GAP SERPL CALC-SCNC: 9 MMOL/L — SIGNIFICANT CHANGE UP (ref 7–14)
ANION GAP SERPL CALC-SCNC: 9 MMOL/L — SIGNIFICANT CHANGE UP (ref 7–14)
BUN SERPL-MCNC: 22 MG/DL — SIGNIFICANT CHANGE UP (ref 7–23)
BUN SERPL-MCNC: 22 MG/DL — SIGNIFICANT CHANGE UP (ref 7–23)
CALCIUM SERPL-MCNC: 9 MG/DL — SIGNIFICANT CHANGE UP (ref 8.4–10.5)
CALCIUM SERPL-MCNC: 9 MG/DL — SIGNIFICANT CHANGE UP (ref 8.4–10.5)
CHLORIDE SERPL-SCNC: 104 MMOL/L — SIGNIFICANT CHANGE UP (ref 98–107)
CHLORIDE SERPL-SCNC: 104 MMOL/L — SIGNIFICANT CHANGE UP (ref 98–107)
CO2 SERPL-SCNC: 24 MMOL/L — SIGNIFICANT CHANGE UP (ref 22–31)
CO2 SERPL-SCNC: 24 MMOL/L — SIGNIFICANT CHANGE UP (ref 22–31)
CREAT SERPL-MCNC: 1.26 MG/DL — SIGNIFICANT CHANGE UP (ref 0.5–1.3)
CREAT SERPL-MCNC: 1.26 MG/DL — SIGNIFICANT CHANGE UP (ref 0.5–1.3)
EGFR: 49 ML/MIN/1.73M2 — LOW
EGFR: 49 ML/MIN/1.73M2 — LOW
GLUCOSE BLDC GLUCOMTR-MCNC: 124 MG/DL — HIGH (ref 70–99)
GLUCOSE BLDC GLUCOMTR-MCNC: 124 MG/DL — HIGH (ref 70–99)
GLUCOSE BLDC GLUCOMTR-MCNC: 133 MG/DL — HIGH (ref 70–99)
GLUCOSE BLDC GLUCOMTR-MCNC: 133 MG/DL — HIGH (ref 70–99)
GLUCOSE BLDC GLUCOMTR-MCNC: 142 MG/DL — HIGH (ref 70–99)
GLUCOSE BLDC GLUCOMTR-MCNC: 142 MG/DL — HIGH (ref 70–99)
GLUCOSE BLDC GLUCOMTR-MCNC: 211 MG/DL — HIGH (ref 70–99)
GLUCOSE BLDC GLUCOMTR-MCNC: 211 MG/DL — HIGH (ref 70–99)
GLUCOSE SERPL-MCNC: 110 MG/DL — HIGH (ref 70–99)
GLUCOSE SERPL-MCNC: 110 MG/DL — HIGH (ref 70–99)
HCT VFR BLD CALC: 25.1 % — LOW (ref 34.5–45)
HCT VFR BLD CALC: 25.1 % — LOW (ref 34.5–45)
HGB BLD-MCNC: 8 G/DL — LOW (ref 11.5–15.5)
HGB BLD-MCNC: 8 G/DL — LOW (ref 11.5–15.5)
MAGNESIUM SERPL-MCNC: 1.5 MG/DL — LOW (ref 1.6–2.6)
MAGNESIUM SERPL-MCNC: 1.5 MG/DL — LOW (ref 1.6–2.6)
MCHC RBC-ENTMCNC: 21.8 PG — LOW (ref 27–34)
MCHC RBC-ENTMCNC: 21.8 PG — LOW (ref 27–34)
MCHC RBC-ENTMCNC: 31.9 GM/DL — LOW (ref 32–36)
MCHC RBC-ENTMCNC: 31.9 GM/DL — LOW (ref 32–36)
MCV RBC AUTO: 68.4 FL — LOW (ref 80–100)
MCV RBC AUTO: 68.4 FL — LOW (ref 80–100)
NRBC # BLD: 0 /100 WBCS — SIGNIFICANT CHANGE UP (ref 0–0)
NRBC # BLD: 0 /100 WBCS — SIGNIFICANT CHANGE UP (ref 0–0)
NRBC # FLD: 0 K/UL — SIGNIFICANT CHANGE UP (ref 0–0)
NRBC # FLD: 0 K/UL — SIGNIFICANT CHANGE UP (ref 0–0)
PHOSPHATE SERPL-MCNC: 4 MG/DL — SIGNIFICANT CHANGE UP (ref 2.5–4.5)
PHOSPHATE SERPL-MCNC: 4 MG/DL — SIGNIFICANT CHANGE UP (ref 2.5–4.5)
PLATELET # BLD AUTO: 365 K/UL — SIGNIFICANT CHANGE UP (ref 150–400)
PLATELET # BLD AUTO: 365 K/UL — SIGNIFICANT CHANGE UP (ref 150–400)
POTASSIUM SERPL-MCNC: 3.9 MMOL/L — SIGNIFICANT CHANGE UP (ref 3.5–5.3)
POTASSIUM SERPL-MCNC: 3.9 MMOL/L — SIGNIFICANT CHANGE UP (ref 3.5–5.3)
POTASSIUM SERPL-SCNC: 3.9 MMOL/L — SIGNIFICANT CHANGE UP (ref 3.5–5.3)
POTASSIUM SERPL-SCNC: 3.9 MMOL/L — SIGNIFICANT CHANGE UP (ref 3.5–5.3)
RBC # BLD: 3.67 M/UL — LOW (ref 3.8–5.2)
RBC # BLD: 3.67 M/UL — LOW (ref 3.8–5.2)
RBC # FLD: 19.9 % — HIGH (ref 10.3–14.5)
RBC # FLD: 19.9 % — HIGH (ref 10.3–14.5)
SODIUM SERPL-SCNC: 137 MMOL/L — SIGNIFICANT CHANGE UP (ref 135–145)
SODIUM SERPL-SCNC: 137 MMOL/L — SIGNIFICANT CHANGE UP (ref 135–145)
WBC # BLD: 6.49 K/UL — SIGNIFICANT CHANGE UP (ref 3.8–10.5)
WBC # BLD: 6.49 K/UL — SIGNIFICANT CHANGE UP (ref 3.8–10.5)
WBC # FLD AUTO: 6.49 K/UL — SIGNIFICANT CHANGE UP (ref 3.8–10.5)
WBC # FLD AUTO: 6.49 K/UL — SIGNIFICANT CHANGE UP (ref 3.8–10.5)

## 2023-12-11 PROCEDURE — 99232 SBSQ HOSP IP/OBS MODERATE 35: CPT

## 2023-12-11 RX ORDER — MAGNESIUM OXIDE 400 MG ORAL TABLET 241.3 MG
400 TABLET ORAL
Refills: 0 | Status: COMPLETED | OUTPATIENT
Start: 2023-12-11 | End: 2023-12-13

## 2023-12-11 RX ORDER — MAGNESIUM OXIDE 400 MG ORAL TABLET 241.3 MG
400 TABLET ORAL ONCE
Refills: 0 | Status: COMPLETED | OUTPATIENT
Start: 2023-12-11 | End: 2023-12-11

## 2023-12-11 RX ORDER — POLYETHYLENE GLYCOL 3350 17 G/17G
17 POWDER, FOR SOLUTION ORAL DAILY
Refills: 0 | Status: DISCONTINUED | OUTPATIENT
Start: 2023-12-11 | End: 2023-12-18

## 2023-12-11 RX ADMIN — HEPARIN SODIUM 5000 UNIT(S): 5000 INJECTION INTRAVENOUS; SUBCUTANEOUS at 13:19

## 2023-12-11 RX ADMIN — HEPARIN SODIUM 5000 UNIT(S): 5000 INJECTION INTRAVENOUS; SUBCUTANEOUS at 21:19

## 2023-12-11 RX ADMIN — Medication 2000 UNIT(S): at 13:18

## 2023-12-11 RX ADMIN — HEPARIN SODIUM 5000 UNIT(S): 5000 INJECTION INTRAVENOUS; SUBCUTANEOUS at 05:53

## 2023-12-11 RX ADMIN — CEFEPIME 100 MILLIGRAM(S): 1 INJECTION, POWDER, FOR SOLUTION INTRAMUSCULAR; INTRAVENOUS at 17:52

## 2023-12-11 RX ADMIN — POLYETHYLENE GLYCOL 3350 17 GRAM(S): 17 POWDER, FOR SOLUTION ORAL at 17:48

## 2023-12-11 RX ADMIN — MAGNESIUM OXIDE 400 MG ORAL TABLET 400 MILLIGRAM(S): 241.3 TABLET ORAL at 10:09

## 2023-12-11 RX ADMIN — TRAMADOL HYDROCHLORIDE 25 MILLIGRAM(S): 50 TABLET ORAL at 18:15

## 2023-12-11 RX ADMIN — Medication 166.67 MILLIGRAM(S): at 21:18

## 2023-12-11 RX ADMIN — MAGNESIUM OXIDE 400 MG ORAL TABLET 400 MILLIGRAM(S): 241.3 TABLET ORAL at 13:18

## 2023-12-11 RX ADMIN — Medication 1 TABLET(S): at 13:18

## 2023-12-11 RX ADMIN — CHLORHEXIDINE GLUCONATE 1 APPLICATION(S): 213 SOLUTION TOPICAL at 13:18

## 2023-12-11 RX ADMIN — MAGNESIUM OXIDE 400 MG ORAL TABLET 400 MILLIGRAM(S): 241.3 TABLET ORAL at 17:48

## 2023-12-11 RX ADMIN — ATORVASTATIN CALCIUM 40 MILLIGRAM(S): 80 TABLET, FILM COATED ORAL at 21:19

## 2023-12-11 RX ADMIN — SENNA PLUS 1 TABLET(S): 8.6 TABLET ORAL at 21:19

## 2023-12-11 RX ADMIN — CEFEPIME 100 MILLIGRAM(S): 1 INJECTION, POWDER, FOR SOLUTION INTRAMUSCULAR; INTRAVENOUS at 05:51

## 2023-12-11 RX ADMIN — QUETIAPINE FUMARATE 25 MILLIGRAM(S): 200 TABLET, FILM COATED ORAL at 21:19

## 2023-12-11 NOTE — PROGRESS NOTE ADULT - ASSESSMENT
This is a 64 y/o F w/ PMHx of DM, HTN, HLD, chronic b/l lymphedema who presents to [facility] on 12/[facility] for LE wounds with malodorous discharge that has been worsening. Also w/ dysuria.  Pt was briefly lethargic/hypotensive, started on BiPAP but quickly discontinued. Pt was afebrile, WBC 14.8.   Seen by podiatry for L foot bullae, no findings of active infection. L foot XR 5th MT head possible erosion. U/A with 84 WBCs.   ID now consulted for leukocytosis in the setting of LE drainage, positive U/A and L foot XR w/ 5th MT erosion     #LE lymphedema, with malodorous discharge  #Sepsis  #UTI  #L fifth metatarsal lucency seen on XR, c/f OM    Overall leukocytosis possible to SSTI, OM, UTI. Would continue with empiric coverage with Vancomycin/Cefepime, f/u UCx. Obtain imaging of LLE. WBC improved w/ empiric Vancomycin, Cefepime, wounds on RLE, purulent/malodorous drainage. Per nursing, less drainage from wounds, less odor, improved. Pt w/ less pain     Plan:   1. C/w Vancomycin 1250 mg q24, Cefepime 2 g q12. Will plan on 10 day course for SSTI, ending today.  2. Can d/c MRI tib/fib, pending MRI of L foot, per ACP, pt has been refusing. Per podiatry low c/f for infection from foot.   If patient continues to defer MRI, can cancel imaging.     Thank you for this consult. Inpatient ID consult team will sign off.    Further changes in lab values, imaging studies, or clinical status will not be known to ID inpatient consultants unless specifically communicated by primary team.    Jose Alberto Tomas MD  Attending Physician  Division of Infectious Diseases  Department of Medicine    Please contact through MS Teams message.  Office: [phone] (after 5 PM or weekend)   This is a 62 y/o F w/ PMHx of DM, HTN, HLD, chronic b/l lymphedema who presents to [facility] on 12/2 for LE wounds with malodorous discharge that has been worsening. Also w/ dysuria.  Pt was briefly lethargic/hypotensive, started on BiPAP but quickly discontinued. Pt was afebrile, WBC 14.8.   Seen by podiatry for L foot bullae, no findings of active infection. L foot XR 5th MT head possible erosion. U/A with 84 WBCs.   ID now consulted for leukocytosis in the setting of LE drainage, positive U/A and L foot XR w/ 5th MT erosion     #LE lymphedema, with malodorous discharge  #Sepsis  #UTI  #L fifth metatarsal lucency seen on XR, c/f OM    Overall leukocytosis possible to SSTI, OM, UTI. Would continue with empiric coverage with Vancomycin/Cefepime, f/u UCx. Obtain imaging of LLE. WBC improved w/ empiric Vancomycin, Cefepime, wounds on RLE, purulent/malodorous drainage. Per nursing, less drainage from wounds, less odor, improved. Pt w/ less pain     Plan:   1. C/w Vancomycin 1250 mg q24, Cefepime 2 g q12. Will plan on 10 day course for SSTI, ending today.  2. Can d/c MRI tib/fib, pending MRI of L foot, per ACP, pt has been refusing. Per podiatry low c/f for infection from foot.   If patient continues to defer MRI, can cancel imaging.     Thank you for this consult. Inpatient ID consult team will sign off.    Further changes in lab values, imaging studies, or clinical status will not be known to ID inpatient consultants unless specifically communicated by primary team.    Jose Alberto Tomas MD  Attending Physician  Division of Infectious Diseases  Department of Medicine    Please contact through MS Teams message.  Office: [phone] (after 5 PM or weekend)

## 2023-12-11 NOTE — PROGRESS NOTE ADULT - PROBLEM SELECTOR PLAN 1
-pt p/w leukocytosis, tachy with multiple potential sources of infection -skin/soft tissue, OM, UTI, PNA on admission   -treat  with vanc (by level) and cefepime (abx end today per ID)  -f/u MRI of left foot to r/o OM of 5th digit. Patient amenable today  f/u blood and urine cultures grew E coli  -f/u MRSA swab  - ID consulted, appreciate recs  -f/w wound consult recs   Doppler b/l LE r/o DVT negative

## 2023-12-11 NOTE — PROGRESS NOTE ADULT - SUBJECTIVE AND OBJECTIVE BOX
Infectious Diseases Follow Up:    Patient is a 63y old  Female who presents with a chief complaint of le edema/pain (10 Dec 2023 16:08)      Interval History/ROS:  No acute changes, per nursing ON, dressing less saturated, wounds improved.  Pt reports improved b/l LE pain     Allergies  No Known Allergies        ANTIMICROBIALS:  cefepime   IVPB 2000 two times a day  vancomycin  IVPB 1250 every 24 hours      Current Abx:     Previous Abx     OTHER MEDS:  MEDICATIONS  (STANDING):  acetaminophen     Tablet .. 650 every 6 hours PRN  aluminum hydroxide/magnesium hydroxide/simethicone Suspension 30 every 4 hours PRN  atorvastatin 40 at bedtime  dextrose 50% Injectable 25 once  dextrose 50% Injectable 25 once  dextrose 50% Injectable 12.5 once  dextrose Oral Gel 15 once PRN  dextrose Oral Gel 15 once PRN  glucagon  Injectable 1 once  glucagon  Injectable 1 once  heparin   Injectable 5000 every 8 hours  influenza   Vaccine 0.5 once  insulin lispro (ADMELOG) corrective regimen sliding scale  at bedtime  insulin lispro (ADMELOG) corrective regimen sliding scale  three times a day before meals  melatonin 3 at bedtime PRN  QUEtiapine 25 at bedtime  senna 1 at bedtime  traMADol 25 every 6 hours PRN      Vital Signs Last 24 Hrs  T(C): 36.9 (11 Dec 2023 05:50), Max: 37.1 (10 Dec 2023 20:48)  T(F): 98.4 (11 Dec 2023 05:50), Max: 98.7 (10 Dec 2023 20:48)  HR: 85 (11 Dec 2023 05:50) (81 - 85)  BP: 135/61 (11 Dec 2023 05:50) (106/76 - 135/61)  BP(mean): --  RR: 18 (11 Dec 2023 05:50) (18 - 18)  SpO2: 100% (11 Dec 2023 05:50) (96% - 100%)    Parameters below as of 11 Dec 2023 05:50  Patient On (Oxygen Delivery Method): room air    PHYSICAL EXAM:  GENERAL: NAD, well-developed  HEAD:  Atraumatic, Normocephalic  EYES: EOMI, PERRLA, conjunctiva and sclera clear  NECK: Supple, No JVD  CHEST/LUNG: Clear to auscultation bilaterally; No wheeze  HEART: Regular rate and rhythm; No murmurs, rubs, or gallops  ABDOMEN: Soft, Nontender, Nondistended; Bowel sounds present  EXTREMITIES:  B/l LE dressed   PSYCH: AAOx3                        8.0    6.49  )-----------( 365      ( 11 Dec 2023 05:52 )             25.1       12-11    137  |  104  |  22  ----------------------------<  110<H>  3.9   |  24  |  1.26    Ca    9.0      11 Dec 2023 05:52  Phos  4.0     12-11  Mg     1.50     12-11        Urinalysis Basic - ( 11 Dec 2023 05:52 )    Color: x / Appearance: x / SG: x / pH: x  Gluc: 110 mg/dL / Ketone: x  / Bili: x / Urobili: x   Blood: x / Protein: x / Nitrite: x   Leuk Esterase: x / RBC: x / WBC x   Sq Epi: x / Non Sq Epi: x / Bacteria: x        MICROBIOLOGY:  Vancomycin Level, Trough: 14.1 ug/mL (12-10-23 @ 23:00)  v  .Blood Blood-Peripheral  12-03-23   No growth at 5 days  --  --      .Blood Blood-Peripheral  12-03-23   No growth at 5 days  --  --      Clean Catch Clean Catch (Midstream)  12-02-23   >100,000 CFU/ml Escherichia coli  --  Escherichia coli                RADIOLOGY:

## 2023-12-11 NOTE — PROGRESS NOTE ADULT - PROBLEM SELECTOR PLAN 4
-episode of hypotension with AMS otherwise no resp distress or desaturation. Pt was started on bipap at the time for unclear reason   -ddx includes 2/2 sepsis, hypovolemia   -BP stable with no evidence of lactic acidosis or end organ damage   -c/w antibiotics--end today  -now resolved

## 2023-12-11 NOTE — PROGRESS NOTE ADULT - SUBJECTIVE AND OBJECTIVE BOX
PROGRESS NOTE:     Patient is a 63y old  Female who presents with a chief complaint of le edema/pain (11 Dec 2023 11:16)      SUBJECTIVE / OVERNIGHT EVENTS: no acute event overnight. Reports feeling ok. Pain is on and off. Pain controlled overall.     ADDITIONAL REVIEW OF SYSTEMS:    MEDICATIONS  (STANDING):  atorvastatin 40 milliGRAM(s) Oral at bedtime  cefepime   IVPB 2000 milliGRAM(s) IV Intermittent two times a day  chlorhexidine 2% Cloths 1 Application(s) Topical daily  cholecalciferol 2000 Unit(s) Oral daily  dextrose 50% Injectable 25 Gram(s) IV Push once  dextrose 50% Injectable 25 Gram(s) IV Push once  dextrose 50% Injectable 12.5 Gram(s) IV Push once  glucagon  Injectable 1 milliGRAM(s) IntraMuscular once  glucagon  Injectable 1 milliGRAM(s) IntraMuscular once  heparin   Injectable 5000 Unit(s) SubCutaneous every 8 hours  influenza   Vaccine 0.5 milliLiter(s) IntraMuscular once  insulin lispro (ADMELOG) corrective regimen sliding scale   SubCutaneous three times a day before meals  insulin lispro (ADMELOG) corrective regimen sliding scale   SubCutaneous at bedtime  magnesium oxide 400 milliGRAM(s) Oral three times a day with meals  multivitamin 1 Tablet(s) Oral daily  mupirocin 2% Ointment 1 Application(s) Topical two times a day  QUEtiapine 25 milliGRAM(s) Oral at bedtime  senna 1 Tablet(s) Oral at bedtime  vancomycin  IVPB 1250 milliGRAM(s) IV Intermittent every 24 hours    MEDICATIONS  (PRN):  acetaminophen     Tablet .. 650 milliGRAM(s) Oral every 6 hours PRN Temp greater or equal to 38C (100.4F), Mild Pain (1 - 3)  aluminum hydroxide/magnesium hydroxide/simethicone Suspension 30 milliLiter(s) Oral every 4 hours PRN Dyspepsia  dextrose Oral Gel 15 Gram(s) Oral once PRN Blood Glucose LESS THAN 70 milliGRAM(s)/deciliter  dextrose Oral Gel 15 Gram(s) Oral once PRN Blood Glucose LESS THAN 70 milliGRAM(s)/deciliter  melatonin 3 milliGRAM(s) Oral at bedtime PRN Insomnia  traMADol 25 milliGRAM(s) Oral every 6 hours PRN Moderate Pain (4 - 6)      CAPILLARY BLOOD GLUCOSE      POCT Blood Glucose.: 124 mg/dL (11 Dec 2023 17:12)  POCT Blood Glucose.: 142 mg/dL (11 Dec 2023 12:02)  POCT Blood Glucose.: 133 mg/dL (11 Dec 2023 08:13)  POCT Blood Glucose.: 157 mg/dL (10 Dec 2023 22:25)    I&O's Summary      PHYSICAL EXAM:  Vital Signs Last 24 Hrs  T(C): 36.8 (11 Dec 2023 12:50), Max: 37.1 (10 Dec 2023 20:48)  T(F): 98.2 (11 Dec 2023 12:50), Max: 98.7 (10 Dec 2023 20:48)  HR: 64 (11 Dec 2023 12:50) (64 - 85)  BP: 123/66 (11 Dec 2023 12:50) (109/59 - 135/61)  BP(mean): --  RR: 18 (11 Dec 2023 12:50) (18 - 18)  SpO2: 96% (11 Dec 2023 12:50) (96% - 100%)    Parameters below as of 11 Dec 2023 05:50  Patient On (Oxygen Delivery Method): room air        CONSTITUTIONAL: NAD, sitting up in bed comfortably listening to music  RESPIRATORY: Normal respiratory effort; lungs are clear to auscultation bilaterally  CARDIOVASCULAR: Regular rate and rhythm, normal S1 and S2, no murmur/rub/gallop  ABDOMEN: Nontender to palpation, normoactive bowel sounds, no rebound/guarding  MUSCLOSKELETAL: no clubbing or cyanosis of digits  SKIN: dry skin w/ b/l lymphedema, LE wrapped b/l  PSYCH: A+O to person, place, and time; affect appropriate    LABS:                        8.0    6.49  )-----------( 365      ( 11 Dec 2023 05:52 )             25.1     12-11    137  |  104  |  22  ----------------------------<  110<H>  3.9   |  24  |  1.26    Ca    9.0      11 Dec 2023 05:52  Phos  4.0     12-11  Mg     1.50     12-11            Urinalysis Basic - ( 11 Dec 2023 05:52 )    Color: x / Appearance: x / SG: x / pH: x  Gluc: 110 mg/dL / Ketone: x  / Bili: x / Urobili: x   Blood: x / Protein: x / Nitrite: x   Leuk Esterase: x / RBC: x / WBC x   Sq Epi: x / Non Sq Epi: x / Bacteria: x          RADIOLOGY & ADDITIONAL TESTS:  Results Reviewed:   Imaging Personally Reviewed:  Electrocardiogram Personally Reviewed:    COORDINATION OF CARE:  Care Discussed with Consultants/Other Providers [Y/N]:  Prior or Outpatient Records Reviewed [Y/N]:

## 2023-12-12 LAB
ANION GAP SERPL CALC-SCNC: 10 MMOL/L — SIGNIFICANT CHANGE UP (ref 7–14)
ANION GAP SERPL CALC-SCNC: 10 MMOL/L — SIGNIFICANT CHANGE UP (ref 7–14)
BUN SERPL-MCNC: 25 MG/DL — HIGH (ref 7–23)
BUN SERPL-MCNC: 25 MG/DL — HIGH (ref 7–23)
CALCIUM SERPL-MCNC: 9.1 MG/DL — SIGNIFICANT CHANGE UP (ref 8.4–10.5)
CALCIUM SERPL-MCNC: 9.1 MG/DL — SIGNIFICANT CHANGE UP (ref 8.4–10.5)
CHLORIDE SERPL-SCNC: 103 MMOL/L — SIGNIFICANT CHANGE UP (ref 98–107)
CHLORIDE SERPL-SCNC: 103 MMOL/L — SIGNIFICANT CHANGE UP (ref 98–107)
CO2 SERPL-SCNC: 24 MMOL/L — SIGNIFICANT CHANGE UP (ref 22–31)
CO2 SERPL-SCNC: 24 MMOL/L — SIGNIFICANT CHANGE UP (ref 22–31)
CREAT SERPL-MCNC: 1.25 MG/DL — SIGNIFICANT CHANGE UP (ref 0.5–1.3)
CREAT SERPL-MCNC: 1.25 MG/DL — SIGNIFICANT CHANGE UP (ref 0.5–1.3)
EGFR: 49 ML/MIN/1.73M2 — LOW
EGFR: 49 ML/MIN/1.73M2 — LOW
GLUCOSE BLDC GLUCOMTR-MCNC: 133 MG/DL — HIGH (ref 70–99)
GLUCOSE BLDC GLUCOMTR-MCNC: 133 MG/DL — HIGH (ref 70–99)
GLUCOSE BLDC GLUCOMTR-MCNC: 153 MG/DL — HIGH (ref 70–99)
GLUCOSE BLDC GLUCOMTR-MCNC: 153 MG/DL — HIGH (ref 70–99)
GLUCOSE BLDC GLUCOMTR-MCNC: 155 MG/DL — HIGH (ref 70–99)
GLUCOSE SERPL-MCNC: 139 MG/DL — HIGH (ref 70–99)
GLUCOSE SERPL-MCNC: 139 MG/DL — HIGH (ref 70–99)
HCT VFR BLD CALC: 25.7 % — LOW (ref 34.5–45)
HCT VFR BLD CALC: 25.7 % — LOW (ref 34.5–45)
HGB BLD-MCNC: 8.2 G/DL — LOW (ref 11.5–15.5)
HGB BLD-MCNC: 8.2 G/DL — LOW (ref 11.5–15.5)
MAGNESIUM SERPL-MCNC: 1.5 MG/DL — LOW (ref 1.6–2.6)
MAGNESIUM SERPL-MCNC: 1.5 MG/DL — LOW (ref 1.6–2.6)
MCHC RBC-ENTMCNC: 21.4 PG — LOW (ref 27–34)
MCHC RBC-ENTMCNC: 21.4 PG — LOW (ref 27–34)
MCHC RBC-ENTMCNC: 31.9 GM/DL — LOW (ref 32–36)
MCHC RBC-ENTMCNC: 31.9 GM/DL — LOW (ref 32–36)
MCV RBC AUTO: 67.1 FL — LOW (ref 80–100)
MCV RBC AUTO: 67.1 FL — LOW (ref 80–100)
NRBC # BLD: 0 /100 WBCS — SIGNIFICANT CHANGE UP (ref 0–0)
NRBC # BLD: 0 /100 WBCS — SIGNIFICANT CHANGE UP (ref 0–0)
NRBC # FLD: 0 K/UL — SIGNIFICANT CHANGE UP (ref 0–0)
NRBC # FLD: 0 K/UL — SIGNIFICANT CHANGE UP (ref 0–0)
PHOSPHATE SERPL-MCNC: 3.8 MG/DL — SIGNIFICANT CHANGE UP (ref 2.5–4.5)
PHOSPHATE SERPL-MCNC: 3.8 MG/DL — SIGNIFICANT CHANGE UP (ref 2.5–4.5)
PLATELET # BLD AUTO: 301 K/UL — SIGNIFICANT CHANGE UP (ref 150–400)
PLATELET # BLD AUTO: 301 K/UL — SIGNIFICANT CHANGE UP (ref 150–400)
POTASSIUM SERPL-MCNC: 4.3 MMOL/L — SIGNIFICANT CHANGE UP (ref 3.5–5.3)
POTASSIUM SERPL-MCNC: 4.3 MMOL/L — SIGNIFICANT CHANGE UP (ref 3.5–5.3)
POTASSIUM SERPL-SCNC: 4.3 MMOL/L — SIGNIFICANT CHANGE UP (ref 3.5–5.3)
POTASSIUM SERPL-SCNC: 4.3 MMOL/L — SIGNIFICANT CHANGE UP (ref 3.5–5.3)
RBC # BLD: 3.83 M/UL — SIGNIFICANT CHANGE UP (ref 3.8–5.2)
RBC # BLD: 3.83 M/UL — SIGNIFICANT CHANGE UP (ref 3.8–5.2)
RBC # FLD: 20 % — HIGH (ref 10.3–14.5)
RBC # FLD: 20 % — HIGH (ref 10.3–14.5)
SODIUM SERPL-SCNC: 137 MMOL/L — SIGNIFICANT CHANGE UP (ref 135–145)
SODIUM SERPL-SCNC: 137 MMOL/L — SIGNIFICANT CHANGE UP (ref 135–145)
VANCOMYCIN FLD-MCNC: 24.3 UG/ML — SIGNIFICANT CHANGE UP
VANCOMYCIN FLD-MCNC: 24.3 UG/ML — SIGNIFICANT CHANGE UP
WBC # BLD: 7.37 K/UL — SIGNIFICANT CHANGE UP (ref 3.8–10.5)
WBC # BLD: 7.37 K/UL — SIGNIFICANT CHANGE UP (ref 3.8–10.5)
WBC # FLD AUTO: 7.37 K/UL — SIGNIFICANT CHANGE UP (ref 3.8–10.5)
WBC # FLD AUTO: 7.37 K/UL — SIGNIFICANT CHANGE UP (ref 3.8–10.5)

## 2023-12-12 PROCEDURE — 99232 SBSQ HOSP IP/OBS MODERATE 35: CPT

## 2023-12-12 PROCEDURE — 99231 SBSQ HOSP IP/OBS SF/LOW 25: CPT

## 2023-12-12 RX ORDER — MAGNESIUM SULFATE 500 MG/ML
2 VIAL (ML) INJECTION ONCE
Refills: 0 | Status: COMPLETED | OUTPATIENT
Start: 2023-12-12 | End: 2023-12-12

## 2023-12-12 RX ADMIN — Medication 25 GRAM(S): at 14:14

## 2023-12-12 RX ADMIN — HEPARIN SODIUM 5000 UNIT(S): 5000 INJECTION INTRAVENOUS; SUBCUTANEOUS at 14:14

## 2023-12-12 RX ADMIN — Medication 1 TABLET(S): at 14:15

## 2023-12-12 RX ADMIN — POLYETHYLENE GLYCOL 3350 17 GRAM(S): 17 POWDER, FOR SOLUTION ORAL at 14:14

## 2023-12-12 RX ADMIN — Medication 1: at 17:30

## 2023-12-12 RX ADMIN — Medication 1: at 09:02

## 2023-12-12 RX ADMIN — TRAMADOL HYDROCHLORIDE 25 MILLIGRAM(S): 50 TABLET ORAL at 05:42

## 2023-12-12 RX ADMIN — HEPARIN SODIUM 5000 UNIT(S): 5000 INJECTION INTRAVENOUS; SUBCUTANEOUS at 21:47

## 2023-12-12 RX ADMIN — ATORVASTATIN CALCIUM 40 MILLIGRAM(S): 80 TABLET, FILM COATED ORAL at 21:46

## 2023-12-12 RX ADMIN — CHLORHEXIDINE GLUCONATE 1 APPLICATION(S): 213 SOLUTION TOPICAL at 14:15

## 2023-12-12 RX ADMIN — SENNA PLUS 1 TABLET(S): 8.6 TABLET ORAL at 21:45

## 2023-12-12 RX ADMIN — TRAMADOL HYDROCHLORIDE 25 MILLIGRAM(S): 50 TABLET ORAL at 06:03

## 2023-12-12 RX ADMIN — MAGNESIUM OXIDE 400 MG ORAL TABLET 400 MILLIGRAM(S): 241.3 TABLET ORAL at 17:29

## 2023-12-12 RX ADMIN — Medication 2000 UNIT(S): at 14:15

## 2023-12-12 RX ADMIN — HEPARIN SODIUM 5000 UNIT(S): 5000 INJECTION INTRAVENOUS; SUBCUTANEOUS at 05:42

## 2023-12-12 RX ADMIN — MAGNESIUM OXIDE 400 MG ORAL TABLET 400 MILLIGRAM(S): 241.3 TABLET ORAL at 09:03

## 2023-12-12 RX ADMIN — MAGNESIUM OXIDE 400 MG ORAL TABLET 400 MILLIGRAM(S): 241.3 TABLET ORAL at 14:14

## 2023-12-12 RX ADMIN — QUETIAPINE FUMARATE 25 MILLIGRAM(S): 200 TABLET, FILM COATED ORAL at 21:45

## 2023-12-12 NOTE — PROGRESS NOTE ADULT - SUBJECTIVE AND OBJECTIVE BOX
PROGRESS NOTE:     Patient is a 63y old  Female who presents with a chief complaint of le edema/pain (12 Dec 2023 13:56)      SUBJECTIVE / OVERNIGHT EVENTS: no acute event overnight. Reports feeling fine. Leg pain controlled.    ADDITIONAL REVIEW OF SYSTEMS:    MEDICATIONS  (STANDING):  atorvastatin 40 milliGRAM(s) Oral at bedtime  chlorhexidine 2% Cloths 1 Application(s) Topical daily  cholecalciferol 2000 Unit(s) Oral daily  dextrose 50% Injectable 25 Gram(s) IV Push once  dextrose 50% Injectable 12.5 Gram(s) IV Push once  dextrose 50% Injectable 25 Gram(s) IV Push once  glucagon  Injectable 1 milliGRAM(s) IntraMuscular once  glucagon  Injectable 1 milliGRAM(s) IntraMuscular once  heparin   Injectable 5000 Unit(s) SubCutaneous every 8 hours  influenza   Vaccine 0.5 milliLiter(s) IntraMuscular once  insulin lispro (ADMELOG) corrective regimen sliding scale   SubCutaneous three times a day before meals  insulin lispro (ADMELOG) corrective regimen sliding scale   SubCutaneous at bedtime  magnesium oxide 400 milliGRAM(s) Oral three times a day with meals  multivitamin 1 Tablet(s) Oral daily  polyethylene glycol 3350 17 Gram(s) Oral daily  QUEtiapine 25 milliGRAM(s) Oral at bedtime  senna 1 Tablet(s) Oral at bedtime    MEDICATIONS  (PRN):  acetaminophen     Tablet .. 650 milliGRAM(s) Oral every 6 hours PRN Temp greater or equal to 38C (100.4F), Mild Pain (1 - 3)  aluminum hydroxide/magnesium hydroxide/simethicone Suspension 30 milliLiter(s) Oral every 4 hours PRN Dyspepsia  dextrose Oral Gel 15 Gram(s) Oral once PRN Blood Glucose LESS THAN 70 milliGRAM(s)/deciliter  dextrose Oral Gel 15 Gram(s) Oral once PRN Blood Glucose LESS THAN 70 milliGRAM(s)/deciliter  melatonin 3 milliGRAM(s) Oral at bedtime PRN Insomnia  traMADol 25 milliGRAM(s) Oral every 6 hours PRN Moderate Pain (4 - 6)      CAPILLARY BLOOD GLUCOSE      POCT Blood Glucose.: 155 mg/dL (12 Dec 2023 17:11)  POCT Blood Glucose.: 133 mg/dL (12 Dec 2023 12:22)  POCT Blood Glucose.: 155 mg/dL (12 Dec 2023 08:30)  POCT Blood Glucose.: 211 mg/dL (11 Dec 2023 22:18)    I&O's Summary      PHYSICAL EXAM:  Vital Signs Last 24 Hrs  T(C): 36.5 (12 Dec 2023 14:43), Max: 36.8 (12 Dec 2023 05:39)  T(F): 97.7 (12 Dec 2023 14:43), Max: 98.3 (12 Dec 2023 05:39)  HR: 69 (12 Dec 2023 14:43) (69 - 81)  BP: 130/60 (12 Dec 2023 14:43) (124/67 - 131/69)  BP(mean): --  RR: 18 (12 Dec 2023 14:43) (18 - 18)  SpO2: 100% (12 Dec 2023 14:43) (96% - 100%)    Parameters below as of 12 Dec 2023 14:25  Patient On (Oxygen Delivery Method): room air        CONSTITUTIONAL: NAD, sitting up in bed comfortably   RESPIRATORY: Normal respiratory effort; lungs are clear to auscultation bilaterally  CARDIOVASCULAR: Regular rate and rhythm, normal S1 and S2, no murmur/rub/gallop  ABDOMEN: Nontender to palpation, normoactive bowel sounds, no rebound/guarding  MUSCLOSKELETAL: no clubbing or cyanosis of digits  SKIN: dry skin w/ b/l lymphedema, LE wrapped b/l  PSYCH: A+O to person, place, and time; affect appropriate    LABS:                        8.2    7.37  )-----------( 301      ( 12 Dec 2023 06:49 )             25.7     12-12    137  |  103  |  25<H>  ----------------------------<  139<H>  4.3   |  24  |  1.25    Ca    9.1      12 Dec 2023 06:49  Phos  3.8     12-12  Mg     1.50     12-12            Urinalysis Basic - ( 12 Dec 2023 06:49 )    Color: x / Appearance: x / SG: x / pH: x  Gluc: 139 mg/dL / Ketone: x  / Bili: x / Urobili: x   Blood: x / Protein: x / Nitrite: x   Leuk Esterase: x / RBC: x / WBC x   Sq Epi: x / Non Sq Epi: x / Bacteria: x          RADIOLOGY & ADDITIONAL TESTS:  Results Reviewed:   Imaging Personally Reviewed:  Electrocardiogram Personally Reviewed:    COORDINATION OF CARE:  Care Discussed with Consultants/Other Providers [Y/N]:  Prior or Outpatient Records Reviewed [Y/N]:

## 2023-12-12 NOTE — PROGRESS NOTE ADULT - SUBJECTIVE AND OBJECTIVE BOX
[facility]-- WOUND TEAM -- FOLLOW UP NOTE  --------------------------------------------------------------------------------    subjective: Patient seen and examined at bedside.     Interval HPI/24 hour events:   Chart reviewed including labs and relevant images      Diet:  Diet, Regular:   Consistent Carbohydrate Evening Snack (CSTCHOSN)  DASH/TLC Sodium & Cholesterol Restricted (DASH) (12-10-23 @ 15:22)      ROS: General/ SKIN/ see HPI  all other systems negative      ALLERGIES & MEDICATIONS  --------------------------------------------------------------------------------  Allergies    No Known Allergies    Intolerances          STANDING INPATIENT MEDICATIONS    atorvastatin 40 milliGRAM(s) Oral at bedtime  chlorhexidine 2% Cloths 1 Application(s) Topical daily  cholecalciferol 2000 Unit(s) Oral daily  dextrose 50% Injectable 25 Gram(s) IV Push once  dextrose 50% Injectable 25 Gram(s) IV Push once  dextrose 50% Injectable 12.5 Gram(s) IV Push once  glucagon  Injectable 1 milliGRAM(s) IntraMuscular once  glucagon  Injectable 1 milliGRAM(s) IntraMuscular once  heparin   Injectable 5000 Unit(s) SubCutaneous every 8 hours  influenza   Vaccine 0.5 milliLiter(s) IntraMuscular once  insulin lispro (ADMELOG) corrective regimen sliding scale   SubCutaneous at bedtime  insulin lispro (ADMELOG) corrective regimen sliding scale   SubCutaneous three times a day before meals  magnesium oxide 400 milliGRAM(s) Oral three times a day with meals  magnesium sulfate  IVPB 2 Gram(s) IV Intermittent once  multivitamin 1 Tablet(s) Oral daily  mupirocin 2% Ointment 1 Application(s) Topical two times a day  polyethylene glycol 3350 17 Gram(s) Oral daily  QUEtiapine 25 milliGRAM(s) Oral at bedtime  senna 1 Tablet(s) Oral at bedtime      PRN INPATIENT MEDICATION  acetaminophen     Tablet .. 650 milliGRAM(s) Oral every 6 hours PRN  aluminum hydroxide/magnesium hydroxide/simethicone Suspension 30 milliLiter(s) Oral every 4 hours PRN  dextrose Oral Gel 15 Gram(s) Oral once PRN  dextrose Oral Gel 15 Gram(s) Oral once PRN  melatonin 3 milliGRAM(s) Oral at bedtime PRN  traMADol 25 milliGRAM(s) Oral every 6 hours PRN        Vital signs:  T(C): 36.8 (12-12-23 @ 05:39), Max: 36.8 (12-12-23 @ 05:39)  HR: 81 (12-12-23 @ 05:39) (80 - 81)  BP: 124/67 (12-12-23 @ 05:39) (124/67 - 131/69)  RR: 18 (12-12-23 @ 05:39) (18 - 18)  SpO2: 97% (12-12-23 @ 05:39) (96% - 97%)  Wt(kg): --          Physical exam:  General: NAD, A & O x 3, lethargic. Cachetic, temporal wasting, prominent bony prominences. Obese. Funtional quadrapalegic  HEENT: NC/NT, mucosa moist/dry. Poor dentation.  GI: Soft, NT/ND. + BS. Fecal incontinence.  : Indwelling baeza catheter, condom cathete, penile pouch  Vascular: No temperature changes noted. Increased coolness from midfoot to distal toes. + (  ) DP/PT pulses. Capillary refill < 3 seconds. + Edema bilaterally. Thickened toenails. (+) hemosiderin staining.  Skin: moist, adequate skin turgor. Dry, poor skin turgor. Frail.  Psych: mood and demeanor appropriate  full note to follow    LABS/ CULTURES/ RADIOLOGY:              8.2    7.37  >-----------<  301      [12-12-23 @ 06:49]              25.7     137  |  103  |  25  ----------------------------<  139      [12-12-23 @ 06:49]  4.3   |  24  |  1.25        Ca     9.1     [12-12-23 @ 06:49]      Mg     1.50     [12-12-23 @ 06:49]      Phos  3.8     [12-12-23 @ 06:49]                CAPILLARY BLOOD GLUCOSE      POCT Blood Glucose.: 133 mg/dL (12 Dec 2023 12:22)  POCT Blood Glucose.: 155 mg/dL (12 Dec 2023 08:30)  POCT Blood Glucose.: 211 mg/dL (11 Dec 2023 22:18)  POCT Blood Glucose.: 124 mg/dL (11 Dec 2023 17:12)        Triglycerides, Serum: 92 mg/dL (12-03-23 @ 05:27)                A1C with Estimated Average Glucose Result: 6.9 % (12-03-23 @ 05:27)  A1C with Estimated Average Glucose Result: 6.9 % (11-01-23 @ 08:20)                 [facility]-- WOUND TEAM -- FOLLOW UP NOTE  --------------------------------------------------------------------------------    subjective: Patient seen and examined at bedside.     Interval HPI/24 hour events:   Chart reviewed including labs and relevant images      Diet:  Diet, Regular:   Consistent Carbohydrate Evening Snack (CSTCHOSN)  DASH/TLC Sodium & Cholesterol Restricted (DASH) (12-10-23 @ 15:22)      ROS: General/ SKIN/ see HPI  all other systems negative      ALLERGIES & MEDICATIONS  --------------------------------------------------------------------------------  Allergies    No Known Allergies    Intolerances          STANDING INPATIENT MEDICATIONS    atorvastatin 40 milliGRAM(s) Oral at bedtime  chlorhexidine 2% Cloths 1 Application(s) Topical daily  cholecalciferol 2000 Unit(s) Oral daily  dextrose 50% Injectable 25 Gram(s) IV Push once  dextrose 50% Injectable 25 Gram(s) IV Push once  dextrose 50% Injectable 12.5 Gram(s) IV Push once  glucagon  Injectable 1 milliGRAM(s) IntraMuscular once  glucagon  Injectable 1 milliGRAM(s) IntraMuscular once  heparin   Injectable 5000 Unit(s) SubCutaneous every 8 hours  influenza   Vaccine 0.5 milliLiter(s) IntraMuscular once  insulin lispro (ADMELOG) corrective regimen sliding scale   SubCutaneous at bedtime  insulin lispro (ADMELOG) corrective regimen sliding scale   SubCutaneous three times a day before meals  magnesium oxide 400 milliGRAM(s) Oral three times a day with meals  magnesium sulfate  IVPB 2 Gram(s) IV Intermittent once  multivitamin 1 Tablet(s) Oral daily  mupirocin 2% Ointment 1 Application(s) Topical two times a day  polyethylene glycol 3350 17 Gram(s) Oral daily  QUEtiapine 25 milliGRAM(s) Oral at bedtime  senna 1 Tablet(s) Oral at bedtime      PRN INPATIENT MEDICATION  acetaminophen     Tablet .. 650 milliGRAM(s) Oral every 6 hours PRN  aluminum hydroxide/magnesium hydroxide/simethicone Suspension 30 milliLiter(s) Oral every 4 hours PRN  dextrose Oral Gel 15 Gram(s) Oral once PRN  dextrose Oral Gel 15 Gram(s) Oral once PRN  melatonin 3 milliGRAM(s) Oral at bedtime PRN  traMADol 25 milliGRAM(s) Oral every 6 hours PRN        Vital signs:  T(C): 36.8 (12-12-23 @ 05:39), Max: 36.8 (12-12-23 @ 05:39)  HR: 81 (12-12-23 @ 05:39) (80 - 81)  BP: 124/67 (12-12-23 @ 05:39) (124/67 - 131/69)  RR: 18 (12-12-23 @ 05:39) (18 - 18)  SpO2: 97% (12-12-23 @ 05:39) (96% - 97%)  Wt(kg): --          Physical exam:  General: NAD, A & O x 3, lethargic. Cachetic, temporal wasting, prominent bony prominences. Obese. Funtional quadrapalegic  HEENT: NC/NT, mucosa moist/dry. Poor dentation.  GI: Soft, NT/ND. + BS. Fecal incontinence.  : Indwelling baeza catheter, condom cathete, penile pouch  Vascular: No temperature changes noted. Increased coolness from midfoot to distal toes. + (  ) DP/PT pulses. Capillary refill < 3 seconds. + Edema bilaterally. Thickened toenails. (+) hemosiderin staining.  Skin: moist, adequate skin turgor. Dry, poor skin turgor. Frail.  Psych: mood and demeanor appropriate  full note to follow    LABS/ CULTURES/ RADIOLOGY:              8.2    7.37  >-----------<  301      [12-12-23 @ 06:49]              25.7     137  |  103  |  25  ----------------------------<  139      [12-12-23 @ 06:49]  4.3   |  24  |  1.25        Ca     9.1     [12-12-23 @ 06:49]      Mg     1.50     [12-12-23 @ 06:49]      Phos  3.8     [12-12-23 @ 06:49]                CAPILLARY BLOOD GLUCOSE      POCT Blood Glucose.: 133 mg/dL (12 Dec 2023 12:22)  POCT Blood Glucose.: 155 mg/dL (12 Dec 2023 08:30)  POCT Blood Glucose.: 211 mg/dL (11 Dec 2023 22:18)  POCT Blood Glucose.: 124 mg/dL (11 Dec 2023 17:12)        Triglycerides, Serum: 92 mg/dL (12-03-23 @ 05:27)                A1C with Estimated Average Glucose Result: 6.9 % (12-03-23 @ 05:27)  A1C with Estimated Average Glucose Result: 6.9 % (11-01-23 @ 08:20)                 [facility]-- WOUND TEAM -- FOLLOW UP NOTE  --------------------------------------------------------------------------------    subjective: Patient seen and examined at bedside. Patient in a happy mood, listening to music, denies bilateral leg pain, offer no complaints. Endorses my wounds are much better the nurses have being cleaning my legs. Reports no drainage and significance improvement of edema. Patient also endorses  moisture associated dermatitis to submammary healing, denies skin symptoms. Denies back wounds.     Interval HPI/24 hour events:   US venous duplex of bilateral lower extremities negative for DVT bilaterally.   Pending MRI of left foot to r/o OM as per primary team, as per patient they tried to bring her down yesterday but " couldn't" patient  further endorses I thin today I could go down".    Chart reviewed including labs and relevant images    Diet:  Diet, Regular:   Consistent Carbohydrate Evening Snack (CSTCHOSN)  DASH/TLC Sodium & Cholesterol Restricted (DASH) (12-10-23 @ 15:22)      ROS: General/ SKIN/ see above   all other systems negative      ALLERGIES & MEDICATIONS  --------------------------------------------------------------------------------  Allergies    No Known Allergies    Intolerances    STANDING INPATIENT MEDICATIONS    atorvastatin 40 milliGRAM(s) Oral at bedtime  chlorhexidine 2% Cloths 1 Application(s) Topical daily  cholecalciferol 2000 Unit(s) Oral daily  dextrose 50% Injectable 25 Gram(s) IV Push once  dextrose 50% Injectable 25 Gram(s) IV Push once  dextrose 50% Injectable 12.5 Gram(s) IV Push once  glucagon  Injectable 1 milliGRAM(s) IntraMuscular once  glucagon  Injectable 1 milliGRAM(s) IntraMuscular once  heparin   Injectable 5000 Unit(s) SubCutaneous every 8 hours  influenza   Vaccine 0.5 milliLiter(s) IntraMuscular once  insulin lispro (ADMELOG) corrective regimen sliding scale   SubCutaneous at bedtime  insulin lispro (ADMELOG) corrective regimen sliding scale   SubCutaneous three times a day before meals  magnesium oxide 400 milliGRAM(s) Oral three times a day with meals  magnesium sulfate  IVPB 2 Gram(s) IV Intermittent once  multivitamin 1 Tablet(s) Oral daily  mupirocin 2% Ointment 1 Application(s) Topical two times a day  polyethylene glycol 3350 17 Gram(s) Oral daily  QUEtiapine 25 milliGRAM(s) Oral at bedtime  senna 1 Tablet(s) Oral at bedtime      PRN INPATIENT MEDICATION  acetaminophen     Tablet .. 650 milliGRAM(s) Oral every 6 hours PRN  aluminum hydroxide/magnesium hydroxide/simethicone Suspension 30 milliLiter(s) Oral every 4 hours PRN  dextrose Oral Gel 15 Gram(s) Oral once PRN  dextrose Oral Gel 15 Gram(s) Oral once PRN  melatonin 3 milliGRAM(s) Oral at bedtime PRN  traMADol 25 milliGRAM(s) Oral every 6 hours PRN        Vital signs:  T(C): 36.8 (12-12-23 @ 05:39), Max: 36.8 (12-12-23 @ 05:39)  HR: 81 (12-12-23 @ 05:39) (80 - 81)  BP: 124/67 (12-12-23 @ 05:39) (124/67 - 131/69)  RR: 18 (12-12-23 @ 05:39) (18 - 18)  SpO2: 97% (12-12-23 @ 05:39) (96% - 97%)    Wt(kg): --117 (12-02-23)    Physical exam:  General: NAD, A & O x 3, Morbid Obese   HEENT: NC/NT, mucosa moist  GI: Soft, NT/ND.   : Voids. Functional incontinence at times.   Vascular: No temperature changes noted. + 1 DP pulses. Lymphedema bilaterally. Elongated toenails. (+) hemosiderin staining. + areas of hypopigmentation to lower extremities. Severe hyperkeratosis of skin with skin desquamation. Large crust/scaly  area to left anterior lower leg. Left foot second to fifth dorsal toes with wrinkled skin, appears to be reabsorbed blisters. No drainage, no open wounds. No associated cellulitis. Square toes bilaterally   Measurements of bilateral legs   Right Calf 43, right ankle 32, left calf 41, left ankle 32.   Right anterior lower leg: 3 X 5 X .3 cm, now healed   Right anterior lateral: wound associated with expressed purulent fluid, without cellulitis: 2 X 2 X .2cm, now healed.   Right posterior excoriation: 15 X 16 cm, now healed.   Left posterior lower leg excoriation : 24 X 14cm, now healed  Left medial lower leg: 5 X 7 X .2 cm, now healed  Aquacel dressing removed, no drainage in dressing. No drainage upon skin palpation. No purulence expressed   Wound cleansed with chlorhexidine wash, pat dry. Apply Atrac tain. Patient decline ACE wraps today, this is comfortable for now. Advised she should keep compressions to assist with edema. Patient verbalized understanding.   No obvious erythema, no induration.   Skin: moist.   increase moisture in bilateral submammary with hyperpigmentation and linear fissures exposing pink dermis. No associated candidiasis.    Back: Patient decline back assessment, endorses "I have no other wounds".   Psych: calm and cooperative.       LABS/ CULTURES/ RADIOLOGY:              8.2    7.37  >-----------<  301      [12-12-23 @ 06:49]              25.7     137  |  103  |  25  ----------------------------<  139      [12-12-23 @ 06:49]  4.3   |  24  |  1.25        Ca     9.1     [12-12-23 @ 06:49]      Mg     1.50     [12-12-23 @ 06:49]      Phos  3.8     [12-12-23 @ 06:49]      CAPILLARY BLOOD GLUCOSE      POCT Blood Glucose.: 133 mg/dL (12 Dec 2023 12:22)  POCT Blood Glucose.: 155 mg/dL (12 Dec 2023 08:30)  POCT Blood Glucose.: 211 mg/dL (11 Dec 2023 22:18)  POCT Blood Glucose.: 124 mg/dL (11 Dec 2023 17:12)        Triglycerides, Serum: 92 mg/dL (12-03-23 @ 05:27)      A1C with Estimated Average Glucose Result: 6.9 % (12-03-23 @ 05:27)  A1C with Estimated Average Glucose Result: 6.9 % (11-01-23 @ 08:20)      ACC: [id number] EXAM:  US DPLX LWR EXT VEINS COMPL BI   ORDERED BY: LEONARDA MATTHEWS     PROCEDURE DATE:  12/05/2023          INTERPRETATION:  CLINICAL INFORMATION: Lymphedema    COMPARISON: None available.    TECHNIQUE: Duplex sonography of the BILATERAL LOWER extremity veins with   color and spectral Doppler, with and without compression.    FINDINGS:    RIGHT:  Normal compressibility of the RIGHT common femoral, femoral and popliteal   veins.  Doppler examination shows normal spontaneous and phasic flow.  Nonvisualization of the right calf veins.    LEFT:  Normal compressibility of the LEFT common femoral, femoral and popliteal   veins.  Doppler examination shows normal spontaneous and phasic flow.  No LEFT calf vein thrombosis is detected.    IMPRESSION:  No evidence of deep venous thrombosis in either lower extremity.            --- End of Report ---            LUANN GREGORY MD; Attending Radiologist  This document has been electronically signed. Dec  5 2023  1:27PM        ACC: [id number] EXAM:  XR FOOT COMP MIN 3 VIEWS BI   ORDERED BY: CHINA SEGOVIA     PROCEDURE DATE:  12/02/2023          INTERPRETATION:  CLINICAL INDICATION: Lower extremity edema with discharge    TECHNIQUE: X-ray bilateral feet: 3 views of left and 2 views of right    COMPARISON: None    FINDINGS:    Left:  No acute fracture. Cortical lucency along the medial aspect of the fifth   metatarsal head may represent erosion.  No dislocation. Joint space narrowing of the the DIP and PIP joints.  Achilles enthesophyte.  Extensive diffuse soft tissue edema. No subcutaneous tracking of gas.    Right foot:  No acute fracture. No cortical erosions.  No dislocation. Joint space narrowing of the DIP and PIP joints.  Achilles enthesophyte.  Extensive diffuse soft tissue edema. No subcutaneous tracking of gas.    IMPRESSION:  Cortical lucency along the medial aspect of the left fifth metatarsal   head may represent erosion. Correlate clinically and can obtain MR foot   with IV contrast for further evaluation.    Extensive soft tissue edema bilaterally.    --- End of Report ---          SUMIT COLON MD; Resident Radiologist  This document has been electronically signed.  LUBNA URIARTE MD; Attending Interventional Radiologist  This documenthas been electronically signed. Dec  2 2023 10:11AM         [facility]-- WOUND TEAM -- FOLLOW UP NOTE  --------------------------------------------------------------------------------    subjective: Patient seen and examined at bedside. Patient in a happy mood, listening to music, denies bilateral leg pain, offer no complaints. Endorses my wounds are much better the nurses have being cleaning my legs. Reports no drainage and significance improvement of edema. Patient also endorses  moisture associated dermatitis to submammary healing, denies skin symptoms. Denies back wounds.     Interval HPI/24 hour events:   US venous duplex of bilateral lower extremities negative for DVT bilaterally.   Pending MRI of left foot to r/o OM as per primary team, as per patient they tried to bring her down yesterday but " couldn't" patient  further endorses I thin today I could go down".    Chart reviewed including labs and relevant images    Diet:  Diet, Regular:   Consistent Carbohydrate Evening Snack (CSTCHOSN)  DASH/TLC Sodium & Cholesterol Restricted (DASH) (12-10-23 @ 15:22)      ROS: General/ SKIN/ see above   all other systems negative      ALLERGIES & MEDICATIONS  --------------------------------------------------------------------------------  Allergies    No Known Allergies    Intolerances    STANDING INPATIENT MEDICATIONS    atorvastatin 40 milliGRAM(s) Oral at bedtime  chlorhexidine 2% Cloths 1 Application(s) Topical daily  cholecalciferol 2000 Unit(s) Oral daily  dextrose 50% Injectable 25 Gram(s) IV Push once  dextrose 50% Injectable 25 Gram(s) IV Push once  dextrose 50% Injectable 12.5 Gram(s) IV Push once  glucagon  Injectable 1 milliGRAM(s) IntraMuscular once  glucagon  Injectable 1 milliGRAM(s) IntraMuscular once  heparin   Injectable 5000 Unit(s) SubCutaneous every 8 hours  influenza   Vaccine 0.5 milliLiter(s) IntraMuscular once  insulin lispro (ADMELOG) corrective regimen sliding scale   SubCutaneous at bedtime  insulin lispro (ADMELOG) corrective regimen sliding scale   SubCutaneous three times a day before meals  magnesium oxide 400 milliGRAM(s) Oral three times a day with meals  magnesium sulfate  IVPB 2 Gram(s) IV Intermittent once  multivitamin 1 Tablet(s) Oral daily  mupirocin 2% Ointment 1 Application(s) Topical two times a day  polyethylene glycol 3350 17 Gram(s) Oral daily  QUEtiapine 25 milliGRAM(s) Oral at bedtime  senna 1 Tablet(s) Oral at bedtime      PRN INPATIENT MEDICATION  acetaminophen     Tablet .. 650 milliGRAM(s) Oral every 6 hours PRN  aluminum hydroxide/magnesium hydroxide/simethicone Suspension 30 milliLiter(s) Oral every 4 hours PRN  dextrose Oral Gel 15 Gram(s) Oral once PRN  dextrose Oral Gel 15 Gram(s) Oral once PRN  melatonin 3 milliGRAM(s) Oral at bedtime PRN  traMADol 25 milliGRAM(s) Oral every 6 hours PRN        Vital signs:  T(C): 36.8 (12-12-23 @ 05:39), Max: 36.8 (12-12-23 @ 05:39)  HR: 81 (12-12-23 @ 05:39) (80 - 81)  BP: 124/67 (12-12-23 @ 05:39) (124/67 - 131/69)  RR: 18 (12-12-23 @ 05:39) (18 - 18)  SpO2: 97% (12-12-23 @ 05:39) (96% - 97%)    Wt(kg): --117 (12-02-23)    Physical exam:  General: NAD, A & O x 3, Morbid Obese   HEENT: NC/NT, mucosa moist  GI: Soft, NT/ND.   : Voids. Functional incontinence at times.   Vascular: No temperature changes noted. + 1 DP pulses. Lymphedema bilaterally. Elongated toenails. (+) hemosiderin staining. + areas of hypopigmentation to lower extremities. Severe hyperkeratosis of skin with skin desquamation. Large crust/scaly  area to left anterior lower leg. Left foot second to fifth dorsal toes with wrinkled skin, appears to be reabsorbed blisters. No drainage, no open wounds. No associated cellulitis. Square toes bilaterally   Measurements of bilateral legs   Right Calf 43, right ankle 32, left calf 41, left ankle 32.   Right anterior lower leg: 3 X 5 X .3 cm, now healed   Right anterior lateral: wound associated with expressed purulent fluid, without cellulitis: 2 X 2 X .2cm, now healed.   Right posterior excoriation: 15 X 16 cm, now healed.   Left posterior lower leg excoriation : 24 X 14cm, now healed  Left medial lower leg: 5 X 7 X .2 cm, now healed  Aquacel dressing removed, no drainage in dressing. No drainage upon skin palpation. No purulence expressed   Wound cleansed with chlorhexidine wash, pat dry. Apply Atrac tain. Patient decline ACE wraps today, this is comfortable for now. Advised she should keep compressions to assist with edema. Patient verbalized understanding.   No obvious erythema, no induration.   Skin: moist.   increase moisture in bilateral submammary with hyperpigmentation and linear fissures exposing pink dermis. No associated candidiasis.    Back: Patient decline back assessment, endorses "I have no other wounds".   Psych: calm and cooperative.       LABS/ CULTURES/ RADIOLOGY:              8.2    7.37  >-----------<  301      [12-12-23 @ 06:49]              25.7     137  |  103  |  25  ----------------------------<  139      [12-12-23 @ 06:49]  4.3   |  24  |  1.25        Ca     9.1     [12-12-23 @ 06:49]      Mg     1.50     [12-12-23 @ 06:49]      Phos  3.8     [12-12-23 @ 06:49]      CAPILLARY BLOOD GLUCOSE      POCT Blood Glucose.: 133 mg/dL (12 Dec 2023 12:22)  POCT Blood Glucose.: 155 mg/dL (12 Dec 2023 08:30)  POCT Blood Glucose.: 211 mg/dL (11 Dec 2023 22:18)  POCT Blood Glucose.: 124 mg/dL (11 Dec 2023 17:12)        Triglycerides, Serum: 92 mg/dL (12-03-23 @ 05:27)      A1C with Estimated Average Glucose Result: 6.9 % (12-03-23 @ 05:27)  A1C with Estimated Average Glucose Result: 6.9 % (11-01-23 @ 08:20)      ACC: [id number] EXAM:  US DPLX LWR EXT VEINS COMPL BI   ORDERED BY: LEONARDA MATTHEWS     PROCEDURE DATE:  12/05/2023          INTERPRETATION:  CLINICAL INFORMATION: Lymphedema    COMPARISON: None available.    TECHNIQUE: Duplex sonography of the BILATERAL LOWER extremity veins with   color and spectral Doppler, with and without compression.    FINDINGS:    RIGHT:  Normal compressibility of the RIGHT common femoral, femoral and popliteal   veins.  Doppler examination shows normal spontaneous and phasic flow.  Nonvisualization of the right calf veins.    LEFT:  Normal compressibility of the LEFT common femoral, femoral and popliteal   veins.  Doppler examination shows normal spontaneous and phasic flow.  No LEFT calf vein thrombosis is detected.    IMPRESSION:  No evidence of deep venous thrombosis in either lower extremity.            --- End of Report ---            LUANN GREGORY MD; Attending Radiologist  This document has been electronically signed. Dec  5 2023  1:27PM        ACC: [id number] EXAM:  XR FOOT COMP MIN 3 VIEWS BI   ORDERED BY: CHINA SEGOVIA     PROCEDURE DATE:  12/02/2023          INTERPRETATION:  CLINICAL INDICATION: Lower extremity edema with discharge    TECHNIQUE: X-ray bilateral feet: 3 views of left and 2 views of right    COMPARISON: None    FINDINGS:    Left:  No acute fracture. Cortical lucency along the medial aspect of the fifth   metatarsal head may represent erosion.  No dislocation. Joint space narrowing of the the DIP and PIP joints.  Achilles enthesophyte.  Extensive diffuse soft tissue edema. No subcutaneous tracking of gas.    Right foot:  No acute fracture. No cortical erosions.  No dislocation. Joint space narrowing of the DIP and PIP joints.  Achilles enthesophyte.  Extensive diffuse soft tissue edema. No subcutaneous tracking of gas.    IMPRESSION:  Cortical lucency along the medial aspect of the left fifth metatarsal   head may represent erosion. Correlate clinically and can obtain MR foot   with IV contrast for further evaluation.    Extensive soft tissue edema bilaterally.    --- End of Report ---          SUMIT COLON MD; Resident Radiologist  This document has been electronically signed.  LUBNA URIARTE MD; Attending Interventional Radiologist  This documenthas been electronically signed. Dec  2 2023 10:11AM

## 2023-12-12 NOTE — PROGRESS NOTE ADULT - PROBLEM SELECTOR PLAN 6
-2/2 some hypovolemia?  -improved s/p IVF  -avoid correcting greater than 4-6 meq in 24 hours  -f/u urine lytes
-2/2 some hypovolemia?  -improved s/p IVF  -corrected

## 2023-12-12 NOTE — PROGRESS NOTE ADULT - ASSESSMENT
Assessment/Plan:This is a 64 y/o F w/ PMHx of DM, HTN, HLD, chronic b/l lymphedema who presents to [facility] on 12/2 for LE wounds with malodorous discharge that has been worsening. Also w/ dysuria.  Pt was briefly lethargic/hypotensive, started on BiPAP but quickly discontinued. Pt was afebrile, WBC 14.8.  Seen by podiatry for L foot bullae, no findings of active infection. ID consulted for leukocytosis in the setting of LE drainage, positive U/A and L foot XR w/ 5th MT erosion.     Bilateral leg wounds now healed, bilateral legs with hypopigmentation   -+ lymphedema               Continue low airloss support surface.  Continue to turn and position every 2 hours with z-helena fluidized positioning device.  Continue use of Complete Cair pressure offloading boots.  Continue Nutritional management as per RD recommendations.    Upon discharge follow up at outpatient [facility]. [address] [place] [address]. [phone].    Will continue to follow while inpatient. Please reconsult if needed, will sign off at this time.   Thank you.    Disussed findings and plan with primary team.  Jennifer Meier, ANGELITO-BC, CWOC    pager #[phone] or available in MS teams     If after 4PM or before 7:30AM on Mon-Friday or weekend/holiday please contact general surgery for urgent matters.   Team A- [id number]   Team B- [id number]  For non-urgent matters e-mail [email or web address]    I spent 35/ minutes face-to-face with this patient of which more than 50% of the time was spent counseling/coordinating care of this patient. Assessment/Plan:This is a 62 y/o F w/ PMHx of DM, HTN, HLD, chronic b/l lymphedema who presents to [facility] on 12/2 for LE wounds with malodorous discharge that has been worsening. Also w/ dysuria.  Pt was briefly lethargic/hypotensive, started on BiPAP but quickly discontinued. Pt was afebrile, WBC 14.8.  Seen by podiatry for L foot bullae, no findings of active infection. ID consulted for leukocytosis in the setting of LE drainage, positive U/A and L foot XR w/ 5th MT erosion.     Bilateral leg wounds now healed, bilateral legs with hypopigmentation   -+ lymphedema               Continue low airloss support surface.  Continue to turn and position every 2 hours with z-helena fluidized positioning device.  Continue use of Complete Cair pressure offloading boots.  Continue Nutritional management as per RD recommendations.    Upon discharge follow up at outpatient [facility]. [address] [place] [address]. [phone].    Will continue to follow while inpatient. Please reconsult if needed, will sign off at this time.   Thank you.    Disussed findings and plan with primary team.  Jennifer Meier, ANGELITO-BC, CWOC    pager #[phone] or available in MS teams     If after 4PM or before 7:30AM on Mon-Friday or weekend/holiday please contact general surgery for urgent matters.   Team A- [id number]   Team B- [id number]  For non-urgent matters e-mail [email or web address]    I spent 35/ minutes face-to-face with this patient of which more than 50% of the time was spent counseling/coordinating care of this patient. Assessment/Plan:This is a 62 y/o F w/ PMHx of DM, HTN, HLD, chronic b/l lymphedema who presents to [facility] on 12/2 for LE wounds with malodorous discharge that has been worsening. Also w/ dysuria. Pt was briefly lethargic/hypotensive, started on BiPAP but quickly discontinued. Pt was afebrile, WBC 14.8.  Seen by podiatry for L foot bullae, no findings of active infection. ID consulted for leukocytosis in the setting of LE drainage, positive U/A and L foot XR w/ 5th MT erosion. Pending PT recs.     Bilateral leg wounds now healed, bilateral legs with hypopigmentation, severe hyperkeratosis.   -+ lymphedema symmetrical   -WBC wnl, patient afebrile.   -Completed IV antibiotics as per ID recs.   -Hemoglobin A1C 6.9%-Management as per primary team   -US duplex venous of bilateral lower extremities negative for DVT (12/5/23)  -+ 1 DP pulses, extremities equally warm  -NO obvious erythema, no temperature changes.   -X ray of bilateral feet: Cortical lucency along the medial aspect of the left fifth metatarsal head may represent erosion. Correlate clinically and can obtain MR foot with IV contrast for further evaluation. Extensive soft tissue edema bilaterally. Findings as per primary team. Awaiting MRI-Findings as per primary team.    -Left foot second to fifth dorsal toes with wrinkled skin, appears to be reabsorbed blisters (previously noted to be blisters to left foot toes 1-5 as per podiatry see note 12/2). No drainage, no open wounds noted. No associated cellulitis. Keep clean and dry. Monitor for tissue type changes.   -Topical recommendations: Clean bilateral legs with Chlorhexidene scrub, rinse with NS (place NS in gauze and rinse legs not directly to legs as patient dislikes). Pat dry well including between toes. Apply a thin layer of Atrac Tain cream over dry flaky skin/skin desquamation, do not place between toes. Cover with ABD pad and Kerlix. Secure with ACE wraps (50/50 technique from below toes to below knee). Change daily.   -Elevate lower extremities with pillows   -Consider podiatry reconsult for toenail care, patient with poor nail/foot hygiene. Elongated toenails.     Bilateral submammary moisture associated dermatitis   -linear fissure exposing pink moist dermis, no suspected candidiasis   - Clean with soap and water. Pat dry. Place Interdry textile sheeting, under intertriginous folds leaving 2 inches exposed at ends to wick, remove to wash & dry affected area, then replace. Individual sheeting may be used for up to 5 days unless soiled. Inspect skin daily     Additional Recs   Continue low airloss support surface.  Continue to turn and position every 2 hours with z-helena fluidized positioning device.  Continue use of Complete Cair pressure offloading boots.  Continue Nutritional management as per RD recommendations.    Upon discharge follow up at outpatient [facility] [place]. [address] [place] [address]. [phone] patient might benefit from venous studies.     Will continue to follow while inpatient. Please reconsult if needed, will sign off at this time.   Thank you.    Discussed findings and plan with primary team ACP Lizzy Meier, AGNP-BC, CWOC    pager #[phone] or available in MS teams     If after 4PM or before 7:30AM on Mon-Friday or weekend/holiday please contact general surgery for urgent matters.   Team A- [id number]   Team B- [id number]  For non-urgent matters e-mail [email or web address]    I spent 35 minutes face-to-face with this patient of which more than 50% of the time was spent counseling/coordinating care of this patient. Assessment/Plan:This is a 64 y/o F w/ PMHx of DM, HTN, HLD, chronic b/l lymphedema who presents to [facility] on 12/2 for LE wounds with malodorous discharge that has been worsening. Also w/ dysuria. Pt was briefly lethargic/hypotensive, started on BiPAP but quickly discontinued. Pt was afebrile, WBC 14.8.  Seen by podiatry for L foot bullae, no findings of active infection. ID consulted for leukocytosis in the setting of LE drainage, positive U/A and L foot XR w/ 5th MT erosion. Pending PT recs.     Bilateral leg wounds now healed, bilateral legs with hypopigmentation, severe hyperkeratosis.   -+ lymphedema symmetrical   -WBC wnl, patient afebrile.   -Completed IV antibiotics as per ID recs.   -Hemoglobin A1C 6.9%-Management as per primary team   -US duplex venous of bilateral lower extremities negative for DVT (12/5/23)  -+ 1 DP pulses, extremities equally warm  -NO obvious erythema, no temperature changes.   -X ray of bilateral feet: Cortical lucency along the medial aspect of the left fifth metatarsal head may represent erosion. Correlate clinically and can obtain MR foot with IV contrast for further evaluation. Extensive soft tissue edema bilaterally. Findings as per primary team. Awaiting MRI-Findings as per primary team.    -Left foot second to fifth dorsal toes with wrinkled skin, appears to be reabsorbed blisters (previously noted to be blisters to left foot toes 1-5 as per podiatry see note 12/2). No drainage, no open wounds noted. No associated cellulitis. Keep clean and dry. Monitor for tissue type changes.   -Topical recommendations: Clean bilateral legs with Chlorhexidene scrub, rinse with NS (place NS in gauze and rinse legs not directly to legs as patient dislikes). Pat dry well including between toes. Apply a thin layer of Atrac Tain cream over dry flaky skin/skin desquamation, do not place between toes. Cover with ABD pad and Kerlix. Secure with ACE wraps (50/50 technique from below toes to below knee). Change daily.   -Elevate lower extremities with pillows   -Consider podiatry reconsult for toenail care, patient with poor nail/foot hygiene. Elongated toenails.     Bilateral submammary moisture associated dermatitis   -linear fissure exposing pink moist dermis, no suspected candidiasis   - Clean with soap and water. Pat dry. Place Interdry textile sheeting, under intertriginous folds leaving 2 inches exposed at ends to wick, remove to wash & dry affected area, then replace. Individual sheeting may be used for up to 5 days unless soiled. Inspect skin daily     Additional Recs   Continue low airloss support surface.  Continue to turn and position every 2 hours with z-helena fluidized positioning device.  Continue use of Complete Cair pressure offloading boots.  Continue Nutritional management as per RD recommendations.    Upon discharge follow up at outpatient [facility]. [address] [place] [address]. [phone] patient might benefit from venous studies.     Will continue to follow while inpatient. Please reconsult if needed, will sign off at this time.   Thank you.    Discussed findings and plan with primary team ACP Lizzy Meier, AGNP-BC, CWOC    pager #[phone] or available in MS teams     If after 4PM or before 7:30AM on Mon-Friday or weekend/holiday please contact general surgery for urgent matters.   Team A- [id number]   Team B- [id number]  For non-urgent matters e-mail [email or web address]    I spent 35 minutes face-to-face with this patient of which more than 50% of the time was spent counseling/coordinating care of this patient.

## 2023-12-12 NOTE — PROGRESS NOTE ADULT - PROBLEM SELECTOR PLAN 4
-episode of hypotension with AMS otherwise no resp distress or desaturation. Pt was started on bipap at the time for unclear reason   -ddx includes 2/2 sepsis, hypovolemia   -BP stable with no evidence of lactic acidosis or end organ damage   -c/w antibiotics--ended 12/11  -now resolved

## 2023-12-12 NOTE — PROGRESS NOTE ADULT - PROBLEM SELECTOR PLAN 8
DVT ppx: hep sq

## 2023-12-12 NOTE — PROGRESS NOTE ADULT - PROBLEM SELECTOR PLAN 7
-c/w low ISS before meals and at bedtime  -CC diet

## 2023-12-12 NOTE — PROGRESS NOTE ADULT - PROBLEM SELECTOR PLAN 5
-c/w empiric antibiotics as above  -f/u urine cultures, E coli
-s/p empiric antibiotics as above  -f/u urine cultures, E coli
-c/w empiric antibiotics as above  -f/u urine cultures, E coli
-c/w empiric antibiotics as above  -f/u urine cultures
-c/w empiric antibiotics as above  -f/u urine cultures, E coli
-s/p empiric antibiotics as above  -f/u urine cultures, E coli

## 2023-12-13 ENCOUNTER — TRANSCRIPTION ENCOUNTER (OUTPATIENT)
Age: 63
End: 2023-12-13

## 2023-12-13 LAB
ANION GAP SERPL CALC-SCNC: 9 MMOL/L — SIGNIFICANT CHANGE UP (ref 7–14)
ANION GAP SERPL CALC-SCNC: 9 MMOL/L — SIGNIFICANT CHANGE UP (ref 7–14)
BUN SERPL-MCNC: 27 MG/DL — HIGH (ref 7–23)
BUN SERPL-MCNC: 27 MG/DL — HIGH (ref 7–23)
CALCIUM SERPL-MCNC: 9.1 MG/DL — SIGNIFICANT CHANGE UP (ref 8.4–10.5)
CALCIUM SERPL-MCNC: 9.1 MG/DL — SIGNIFICANT CHANGE UP (ref 8.4–10.5)
CHLORIDE SERPL-SCNC: 104 MMOL/L — SIGNIFICANT CHANGE UP (ref 98–107)
CHLORIDE SERPL-SCNC: 104 MMOL/L — SIGNIFICANT CHANGE UP (ref 98–107)
CO2 SERPL-SCNC: 24 MMOL/L — SIGNIFICANT CHANGE UP (ref 22–31)
CO2 SERPL-SCNC: 24 MMOL/L — SIGNIFICANT CHANGE UP (ref 22–31)
CREAT SERPL-MCNC: 1.19 MG/DL — SIGNIFICANT CHANGE UP (ref 0.5–1.3)
CREAT SERPL-MCNC: 1.19 MG/DL — SIGNIFICANT CHANGE UP (ref 0.5–1.3)
EGFR: 52 ML/MIN/1.73M2 — LOW
EGFR: 52 ML/MIN/1.73M2 — LOW
GLUCOSE BLDC GLUCOMTR-MCNC: 114 MG/DL — HIGH (ref 70–99)
GLUCOSE BLDC GLUCOMTR-MCNC: 114 MG/DL — HIGH (ref 70–99)
GLUCOSE BLDC GLUCOMTR-MCNC: 148 MG/DL — HIGH (ref 70–99)
GLUCOSE BLDC GLUCOMTR-MCNC: 148 MG/DL — HIGH (ref 70–99)
GLUCOSE BLDC GLUCOMTR-MCNC: 150 MG/DL — HIGH (ref 70–99)
GLUCOSE BLDC GLUCOMTR-MCNC: 150 MG/DL — HIGH (ref 70–99)
GLUCOSE BLDC GLUCOMTR-MCNC: 151 MG/DL — HIGH (ref 70–99)
GLUCOSE BLDC GLUCOMTR-MCNC: 151 MG/DL — HIGH (ref 70–99)
GLUCOSE SERPL-MCNC: 119 MG/DL — HIGH (ref 70–99)
GLUCOSE SERPL-MCNC: 119 MG/DL — HIGH (ref 70–99)
HCT VFR BLD CALC: 25.4 % — LOW (ref 34.5–45)
HCT VFR BLD CALC: 25.4 % — LOW (ref 34.5–45)
HGB BLD-MCNC: 8 G/DL — LOW (ref 11.5–15.5)
HGB BLD-MCNC: 8 G/DL — LOW (ref 11.5–15.5)
MAGNESIUM SERPL-MCNC: 1.8 MG/DL — SIGNIFICANT CHANGE UP (ref 1.6–2.6)
MAGNESIUM SERPL-MCNC: 1.8 MG/DL — SIGNIFICANT CHANGE UP (ref 1.6–2.6)
MCHC RBC-ENTMCNC: 21.3 PG — LOW (ref 27–34)
MCHC RBC-ENTMCNC: 21.3 PG — LOW (ref 27–34)
MCHC RBC-ENTMCNC: 31.5 GM/DL — LOW (ref 32–36)
MCHC RBC-ENTMCNC: 31.5 GM/DL — LOW (ref 32–36)
MCV RBC AUTO: 67.6 FL — LOW (ref 80–100)
MCV RBC AUTO: 67.6 FL — LOW (ref 80–100)
NRBC # BLD: 0 /100 WBCS — SIGNIFICANT CHANGE UP (ref 0–0)
NRBC # BLD: 0 /100 WBCS — SIGNIFICANT CHANGE UP (ref 0–0)
NRBC # FLD: 0 K/UL — SIGNIFICANT CHANGE UP (ref 0–0)
NRBC # FLD: 0 K/UL — SIGNIFICANT CHANGE UP (ref 0–0)
PHOSPHATE SERPL-MCNC: 3.8 MG/DL — SIGNIFICANT CHANGE UP (ref 2.5–4.5)
PHOSPHATE SERPL-MCNC: 3.8 MG/DL — SIGNIFICANT CHANGE UP (ref 2.5–4.5)
PLATELET # BLD AUTO: 306 K/UL — SIGNIFICANT CHANGE UP (ref 150–400)
PLATELET # BLD AUTO: 306 K/UL — SIGNIFICANT CHANGE UP (ref 150–400)
POTASSIUM SERPL-MCNC: 4.2 MMOL/L — SIGNIFICANT CHANGE UP (ref 3.5–5.3)
POTASSIUM SERPL-MCNC: 4.2 MMOL/L — SIGNIFICANT CHANGE UP (ref 3.5–5.3)
POTASSIUM SERPL-SCNC: 4.2 MMOL/L — SIGNIFICANT CHANGE UP (ref 3.5–5.3)
POTASSIUM SERPL-SCNC: 4.2 MMOL/L — SIGNIFICANT CHANGE UP (ref 3.5–5.3)
RBC # BLD: 3.76 M/UL — LOW (ref 3.8–5.2)
RBC # BLD: 3.76 M/UL — LOW (ref 3.8–5.2)
RBC # FLD: 20.2 % — HIGH (ref 10.3–14.5)
RBC # FLD: 20.2 % — HIGH (ref 10.3–14.5)
SODIUM SERPL-SCNC: 137 MMOL/L — SIGNIFICANT CHANGE UP (ref 135–145)
SODIUM SERPL-SCNC: 137 MMOL/L — SIGNIFICANT CHANGE UP (ref 135–145)
WBC # BLD: 6.62 K/UL — SIGNIFICANT CHANGE UP (ref 3.8–10.5)
WBC # BLD: 6.62 K/UL — SIGNIFICANT CHANGE UP (ref 3.8–10.5)
WBC # FLD AUTO: 6.62 K/UL — SIGNIFICANT CHANGE UP (ref 3.8–10.5)
WBC # FLD AUTO: 6.62 K/UL — SIGNIFICANT CHANGE UP (ref 3.8–10.5)

## 2023-12-13 PROCEDURE — 99231 SBSQ HOSP IP/OBS SF/LOW 25: CPT

## 2023-12-13 RX ORDER — ACETAMINOPHEN 500 MG
2 TABLET ORAL
Qty: 0 | Refills: 0 | DISCHARGE
Start: 2023-12-13

## 2023-12-13 RX ORDER — SENNA PLUS 8.6 MG/1
1 TABLET ORAL
Qty: 0 | Refills: 0 | DISCHARGE
Start: 2023-12-13

## 2023-12-13 RX ORDER — POLYETHYLENE GLYCOL 3350 17 G/17G
17 POWDER, FOR SOLUTION ORAL
Qty: 0 | Refills: 0 | DISCHARGE
Start: 2023-12-13

## 2023-12-13 RX ADMIN — ATORVASTATIN CALCIUM 40 MILLIGRAM(S): 80 TABLET, FILM COATED ORAL at 21:29

## 2023-12-13 RX ADMIN — HEPARIN SODIUM 5000 UNIT(S): 5000 INJECTION INTRAVENOUS; SUBCUTANEOUS at 13:11

## 2023-12-13 RX ADMIN — Medication 1 TABLET(S): at 13:12

## 2023-12-13 RX ADMIN — HEPARIN SODIUM 5000 UNIT(S): 5000 INJECTION INTRAVENOUS; SUBCUTANEOUS at 05:00

## 2023-12-13 RX ADMIN — CHLORHEXIDINE GLUCONATE 1 APPLICATION(S): 213 SOLUTION TOPICAL at 13:13

## 2023-12-13 RX ADMIN — HEPARIN SODIUM 5000 UNIT(S): 5000 INJECTION INTRAVENOUS; SUBCUTANEOUS at 21:30

## 2023-12-13 RX ADMIN — SENNA PLUS 1 TABLET(S): 8.6 TABLET ORAL at 21:29

## 2023-12-13 RX ADMIN — Medication 2000 UNIT(S): at 13:11

## 2023-12-13 RX ADMIN — QUETIAPINE FUMARATE 25 MILLIGRAM(S): 200 TABLET, FILM COATED ORAL at 21:29

## 2023-12-13 RX ADMIN — MAGNESIUM OXIDE 400 MG ORAL TABLET 400 MILLIGRAM(S): 241.3 TABLET ORAL at 08:52

## 2023-12-13 NOTE — DISCHARGE NOTE PROVIDER - NSDCMRMEDTOKEN_GEN_ALL_CORE_FT
acetaminophen 325 mg oral tablet: 2 tab(s) orally every 6 hours As needed Temp greater or equal to 38C (100.4F), Mild Pain (1 - 3)  atorvastatin 40 mg oral tablet: 1 tab(s) orally once a day (per pharmacy, last dispensed 7/1/23)  calamine topical lotion: 1 Apply topically to affected area once a day  furosemide 20 mg oral tablet: 1 tab(s) orally once a day  Januvia 50 mg oral tablet: 1 tab(s) orally once a day (per pharmacy, last dispensed 7/1/23)  Multiple Vitamins oral tablet: 1 tab(s) orally once a day  polyethylene glycol 3350 oral powder for reconstitution: 17 gram(s) orally once a day  senna leaf extract oral tablet: 1 tab(s) orally once a day (at bedtime)  Vitamin D3 50 mcg (2000 intl units) oral tablet: 1 tab(s) orally once a day   acetaminophen 325 mg oral tablet: 2 tab(s) orally every 6 hours As needed Temp greater or equal to 38C (100.4F), Mild Pain (1 - 3)  atorvastatin 40 mg oral tablet: 1 tab(s) orally once a day (per pharmacy, last dispensed 7/1/23)  Januvia 50 mg oral tablet: 1 tab(s) orally once a day (per pharmacy, last dispensed 7/1/23)  melatonin 3 mg oral tablet: 1 tab(s) orally once a day (at bedtime) As needed Insomnia  Multiple Vitamins oral tablet: 1 tab(s) orally once a day  polyethylene glycol 3350 oral powder for reconstitution: 17 gram(s) orally once a day  QUEtiapine 25 mg oral tablet: 1 tab(s) orally once a day (at bedtime)  senna leaf extract oral tablet: 1 tab(s) orally once a day (at bedtime)  Vitamin D3 50 mcg (2000 intl units) oral tablet: 1 tab(s) orally once a day

## 2023-12-13 NOTE — PROGRESS NOTE ADULT - ASSESSMENT
63-year-old female with past medical history of DM, HTN, HLD, chronic bilateral lower extremity lymphedema presents by EMS for lower extremity edema with malodorous discharge admitted with sepsis, r/o OM, LE cellulitis, UTI for which ID is consulted. Podiatry consulted, no plan for intervention. WC consulted, recs pending. PT recs pending.

## 2023-12-13 NOTE — DISCHARGE NOTE PROVIDER - HOSPITAL COURSE
63-year-old female with past medical history of DM, HTN, HLD, chronic bilateral lower extremity lymphedema presents by EMS for lower extremity edema with malodorous discharge admitted with sepsis, r/o OM, LE cellulitis, UTI for which ID is consulted. Podiatry consulted, no plan for intervention. WC consulted. Per podiatry: XR does not correlate clinically. Leukocytosis unlikely from foot. Recommend wound care consult for lymphoedema management. No podiatric surgical intervention planned this admission. ID initially recommended MRI of left foot to r/o OM. Per team, there has been challenges with coordinating MRI. While patient has been stating that she agrees to go for MRI during her stay, however when transport arrived to floor, patient wanted transport to wait for extended period of time and didn't cooperate. Multiple attempts to bring patients down for MRI have been made during patient's hospitalization but unsuccessful. Discussed case with ID 12/15, ok with no MRI. Completed a course of vanc and cefepime per ID. On admission, patient also had HELLEN, w/ metabolic acidosis, possibly atn in the setting of sepsis/hypotension, lasix use vs post obstructive process. Treated for uti. s/p Na bicarb gtt with improvement. Renal US- Echogenic right kidney which can be seen with medical renal disease. No hydronephrosis. Now Cr is back to close to baseline.    63-year-old female with past medical history of DM, HTN, HLD, chronic bilateral lower extremity lymphedema presents by EMS for lower extremity edema with malodorous discharge admitted with sepsis, r/o OM, LE cellulitis, UTI for which ID is consulted. Podiatry consulted, no plan for intervention. WC consulted. Per podiatry: XR does not correlate clinically. Leukocytosis unlikely from foot. . No podiatric surgical intervention planned this admission. ID initially recommended MRI of left foot to r/o OM. Per team, there has been challenges with coordinating MRI. While patient has been stating that she agrees to go for MRI during her stay, however when transport arrived to floor, patient wanted transport to wait for extended period of time and didn't cooperate. Multiple attempts to bring patients down for MRI have been made during patient's hospitalization but unsuccessful. Discussed case with ID 12/15, ok with no MRI. Completed a course of vanc and cefepime per ID. On admission, patient also had HELLEN, w/ metabolic acidosis, possibly atn in the setting of sepsis/hypotension, lasix use vs post obstructive process. Treated for uti. s/p Na bicarb gtt with improvement. Renal US- Echogenic right kidney which can be seen with medical renal disease. No hydronephrosis. HELLEN now resolved. Hemodynamically stable for discharge.

## 2023-12-13 NOTE — PHYSICAL THERAPY INITIAL EVALUATION ADULT - PERTINENT HX OF CURRENT PROBLEM, REHAB EVAL
63-year-old female with past medical history of DM, HTN, HLD, chronic bilateral lower extremity lymphedema presents today by EMS for lower extremity edema with malodorous discharge. Patient states that the lower extremity has been getting progressively worse over the past month since last admission and with malodorous discharge. She also reports associated pain in LE. Patient notes that she has been having symptoms of dysuria, increased urinary frequency for 1 month.
63 year old Female presents with chronic weeping lymphedema and left foot bullae

## 2023-12-13 NOTE — DISCHARGE NOTE PROVIDER - NSDCFUADDINST_GEN_ALL_CORE_FT
-Topical recommendations:   Clean bilateral legs with Chlorhexidene scrub, rinse with NS (place NS in gauze and rinse legs not directly to legs as patient dislikes). Pat dry well including between toes. Apply a thin layer of Atrac Tain cream over dry flaky skin/skin desquamation, do not place between toes. Cover with ABD pad and Kerlix. Secure with ACE wraps (50/50 technique from below toes to below knee). Change daily.   -Elevate lower extremities with pillows   -Consider podiatry for toenail care, patient with poor nail/foot hygiene. Elongated toenails.     Bilateral submammary moisture associated dermatitis   -linear fissure exposing pink moist dermis, no suspected candidiasis   - Clean with soap and water. Pat dry. Place Interdry textile sheeting, under intertriginous folds leaving 2 inches exposed at ends to wick, remove to wash & dry affected area, then replace. Individual sheeting may be used for up to 5 days unless soiled. Inspect skin daily     Additional Recs   Continue low airloss support surface.  Continue to turn and position every 2 hours with z-helena fluidized positioning device.  Continue use of Complete Cair pressure offloading boots.

## 2023-12-13 NOTE — PHYSICAL THERAPY INITIAL EVALUATION ADULT - CRITERIA FOR SKILLED THERAPEUTIC INTERVENTIONS
impairments found/functional limitations in following categories/risk reduction/prevention/rehab potential
(1) body pink, extremities blue
impairments found/functional limitations in following categories/risk reduction/prevention/rehab potential/therapy frequency/predicted duration of therapy intervention/anticipated discharge recommendation

## 2023-12-13 NOTE — PHYSICAL THERAPY INITIAL EVALUATION ADULT - IMPAIRMENTS FOUND, PT EVAL
gait, locomotion, and balance/muscle strength
gait, locomotion, and balance/gross motor/muscle strength

## 2023-12-13 NOTE — PHYSICAL THERAPY INITIAL EVALUATION ADULT - LEVEL OF INDEPENDENCE: GAIT, REHAB EVAL
Pt deferred gait assessment secondary to pain upon standing, unable to continue mobilizing. Pt returned to bed, left semi-supine, LOUANN Hurst made aware

## 2023-12-13 NOTE — DISCHARGE NOTE PROVIDER - NSFOLLOWUPCLINICS_GEN_ALL_ED_FT
[facility]  Behavioral Health  [address] [place] [address]  Phone: [phone]  Fax:      [facility]  Behavioral Health  [address] [place] [address]  Phone: [phone]  Fax:

## 2023-12-13 NOTE — DISCHARGE NOTE PROVIDER - DETAILS OF MALNUTRITION DIAGNOSIS/DIAGNOSES
This patient has been assessed with a concern for Malnutrition and was treated during this hospitalization for the following Nutrition diagnosis/diagnoses:     -  12/10/2023: Morbid obesity (BMI > 40)

## 2023-12-13 NOTE — PROGRESS NOTE ADULT - SUBJECTIVE AND OBJECTIVE BOX
PROGRESS NOTE:     Patient is a 63y old  Female who presents with a chief complaint of le edema/pain (13 Dec 2023 07:35)      SUBJECTIVE / OVERNIGHT EVENTS: no acute event overnight. Reports feeling fine. Leg is better.    ADDITIONAL REVIEW OF SYSTEMS:    MEDICATIONS  (STANDING):  atorvastatin 40 milliGRAM(s) Oral at bedtime  chlorhexidine 2% Cloths 1 Application(s) Topical daily  cholecalciferol 2000 Unit(s) Oral daily  dextrose 50% Injectable 25 Gram(s) IV Push once  dextrose 50% Injectable 25 Gram(s) IV Push once  dextrose 50% Injectable 12.5 Gram(s) IV Push once  glucagon  Injectable 1 milliGRAM(s) IntraMuscular once  glucagon  Injectable 1 milliGRAM(s) IntraMuscular once  heparin   Injectable 5000 Unit(s) SubCutaneous every 8 hours  influenza   Vaccine 0.5 milliLiter(s) IntraMuscular once  insulin lispro (ADMELOG) corrective regimen sliding scale   SubCutaneous at bedtime  insulin lispro (ADMELOG) corrective regimen sliding scale   SubCutaneous three times a day before meals  multivitamin 1 Tablet(s) Oral daily  polyethylene glycol 3350 17 Gram(s) Oral daily  QUEtiapine 25 milliGRAM(s) Oral at bedtime  senna 1 Tablet(s) Oral at bedtime    MEDICATIONS  (PRN):  acetaminophen     Tablet .. 650 milliGRAM(s) Oral every 6 hours PRN Temp greater or equal to 38C (100.4F), Mild Pain (1 - 3)  aluminum hydroxide/magnesium hydroxide/simethicone Suspension 30 milliLiter(s) Oral every 4 hours PRN Dyspepsia  dextrose Oral Gel 15 Gram(s) Oral once PRN Blood Glucose LESS THAN 70 milliGRAM(s)/deciliter  dextrose Oral Gel 15 Gram(s) Oral once PRN Blood Glucose LESS THAN 70 milliGRAM(s)/deciliter  melatonin 3 milliGRAM(s) Oral at bedtime PRN Insomnia      CAPILLARY BLOOD GLUCOSE      POCT Blood Glucose.: 150 mg/dL (13 Dec 2023 17:35)  POCT Blood Glucose.: 114 mg/dL (13 Dec 2023 12:05)  POCT Blood Glucose.: 148 mg/dL (13 Dec 2023 08:50)  POCT Blood Glucose.: 153 mg/dL (12 Dec 2023 22:02)    I&O's Summary      PHYSICAL EXAM:  Vital Signs Last 24 Hrs  T(C): 36.8 (13 Dec 2023 14:00), Max: 37.1 (12 Dec 2023 21:20)  T(F): 98.2 (13 Dec 2023 14:00), Max: 98.8 (12 Dec 2023 21:20)  HR: 67 (13 Dec 2023 14:00) (67 - 70)  BP: 156/79 (13 Dec 2023 14:00) (127/52 - 156/79)  BP(mean): --  RR: 18 (13 Dec 2023 14:00) (18 - 18)  SpO2: 99% (13 Dec 2023 14:00) (95% - 99%)    Parameters below as of 13 Dec 2023 14:00  Patient On (Oxygen Delivery Method): room air        CONSTITUTIONAL: NAD, sitting up in bed comfortably   RESPIRATORY: Normal respiratory effort; lungs are clear to auscultation bilaterally  CARDIOVASCULAR: Regular rate and rhythm, normal S1 and S2, no murmur/rub/gallop  ABDOMEN: Nontender to palpation, normoactive bowel sounds, no rebound/guarding  MUSCLOSKELETAL: no clubbing or cyanosis of digits  SKIN: dry skin w/ b/l lymphedema, LE wrapped b/l  PSYCH: A+O to person, place, and time; affect appropriate    LABS:                        8.0    6.62  )-----------( 306      ( 13 Dec 2023 05:25 )             25.4     12-13    137  |  104  |  27<H>  ----------------------------<  119<H>  4.2   |  24  |  1.19    Ca    9.1      13 Dec 2023 05:25  Phos  3.8     12-13  Mg     1.80     12-13            Urinalysis Basic - ( 13 Dec 2023 05:25 )    Color: x / Appearance: x / SG: x / pH: x  Gluc: 119 mg/dL / Ketone: x  / Bili: x / Urobili: x   Blood: x / Protein: x / Nitrite: x   Leuk Esterase: x / RBC: x / WBC x   Sq Epi: x / Non Sq Epi: x / Bacteria: x          RADIOLOGY & ADDITIONAL TESTS:  Results Reviewed:   Imaging Personally Reviewed:  Electrocardiogram Personally Reviewed:    COORDINATION OF CARE:  Care Discussed with Consultants/Other Providers [Y/N]:  Prior or Outpatient Records Reviewed [Y/N]:

## 2023-12-13 NOTE — PHYSICAL THERAPY INITIAL EVALUATION ADULT - ADDITIONAL COMMENTS
Patient is poor historian, please refer to documentation for further clarification. Patient reports she used to live with her daughter but is now homeless. Uses rollator at baseline.     Patient left semi-supine in no apparent distress, +bed alarm, +call bell.
Patient reports she used to live with her daughter but is now homeless. Uses rollator at baseline.     Pt left semi-supine in bed, all lines intact, all needs in reach, bed alarm set, in NAD. LOUANN Hurst aware. Heart rate 68 beats per minute.

## 2023-12-13 NOTE — PHYSICAL THERAPY INITIAL EVALUATION ADULT - DIAGNOSIS, PT EVAL
Decreased functional mobility; Decreased balance; Generalized weakness
Impaired gait and balance secondary to lymphedema

## 2023-12-13 NOTE — DISCHARGE NOTE PROVIDER - NSDCFUADDAPPT_GEN_ALL_CORE_FT
Upon discharge follow up at outpatient [facility] [place]. [address] [facility]. [phone].   Upon discharge follow up at outpatient [facility] [place]. [address] [facility]. [phone].

## 2023-12-13 NOTE — PHYSICAL THERAPY INITIAL EVALUATION ADULT - PLANNED THERAPY INTERVENTIONS, PT EVAL
balance training/bed mobility training/gait training/strengthening/stretching/transfer training
balance training/bed mobility training/gait training/strengthening/transfer training

## 2023-12-13 NOTE — PROGRESS NOTE ADULT - PROBLEM SELECTOR PLAN 1
-pt with severe lymphedema w/ hyperkeratosis. Difficult to determine if acute infection but given increase in malodorous discharge as well as worsening pain, WBC- favor treating empirically with vanc and cefepime   -appreciate pod recs -- will reconsult pending MR findings   -f/u MRI of left foot to r/o OM--per ID and wound care, ok to cancel. MRI order dc'ed  -wound care consult appreciated   - ID consulted appreciate recs

## 2023-12-13 NOTE — DISCHARGE NOTE PROVIDER - NSDCCPCAREPLAN_GEN_ALL_CORE_FT
PRINCIPAL DISCHARGE DIAGNOSIS  Diagnosis: Lymphedema of extremity  Assessment and Plan of Treatment: You came to the hospital for infection of your leg. Infectious disease evaluated you and you have completed a course of antibiotics. Please continue the wound care instruction after discharge.      SECONDARY DISCHARGE DIAGNOSES  Diagnosis: DM (diabetes mellitus)  Assessment and Plan of Treatment: Please follow up with your primary care doctor after discharge.    Diagnosis: HELLEN (acute kidney injury)  Assessment and Plan of Treatment: Your creatinine was elevated when you came to the hospital likely due to multiple factors. It has now resolved back to close to your baseline.   Please follow up with your primary care doctor after discharge.     PRINCIPAL DISCHARGE DIAGNOSIS  Diagnosis: Lymphedema of extremity  Assessment and Plan of Treatment: You came to the hospital for infection of your leg. Infectious disease evaluated you and you have completed a course of antibiotics. Please continue the wound care instruction after discharge. Please return to the hospital should you experience any new or worsening symptoms      SECONDARY DISCHARGE DIAGNOSES  Diagnosis: HELLEN (acute kidney injury)  Assessment and Plan of Treatment: Your creatinine was elevated when you came to the hospital likely due to multiple factors including infection. This has now resolved.  Please follow up with your primary care doctor after discharge for further management.    Diagnosis: DM (diabetes mellitus)  Assessment and Plan of Treatment: Please follow up with your primary care doctor after discharge.

## 2023-12-13 NOTE — PHYSICAL THERAPY INITIAL EVALUATION ADULT - MANUAL MUSCLE TESTING RESULTS, REHAB EVAL
bilateral UE and LE grossly 3/5 throughout
bilateral upper extremities and bilateral lower extremities grossly 3/5.

## 2023-12-13 NOTE — PHYSICAL THERAPY INITIAL EVALUATION ADULT - GENERAL OBSERVATIONS, REHAB EVAL
Patient received semi-supine, agreeable to participate despite reports of pain. 100% oxygen saturation.
Pt received semi-supine in bed, all lines intact, in NAD. Pt agreeable to participating in PT evaluation.

## 2023-12-13 NOTE — DISCHARGE NOTE PROVIDER - ATTENDING DISCHARGE PHYSICAL EXAMINATION:
CONSTITUTIONAL: NAD, sitting in bed   NECK: Supple  RESPIRATORY: Normal respiratory effort  CARDIOVASCULAR: Regular rate and rhythm  ABDOMEN: Nontender to palpation, normoactive bowel sounds  EXT: B/L lymphedema, LE wrapped b/l dressing c/d/i  PSYCH: A+O x 3

## 2023-12-13 NOTE — PHYSICAL THERAPY INITIAL EVALUATION ADULT - NSPTDISCHREC_GEN_A_CORE
To be determined following functional assessment and patient participation.
anticipated discharge to rehab facility to address current functional limitation to optimize safety to allow pt. to reach their optimal level of function./Sub-acute Rehab

## 2023-12-14 LAB
GLUCOSE BLDC GLUCOMTR-MCNC: 111 MG/DL — HIGH (ref 70–99)
GLUCOSE BLDC GLUCOMTR-MCNC: 111 MG/DL — HIGH (ref 70–99)
GLUCOSE BLDC GLUCOMTR-MCNC: 124 MG/DL — HIGH (ref 70–99)
GLUCOSE BLDC GLUCOMTR-MCNC: 124 MG/DL — HIGH (ref 70–99)
GLUCOSE BLDC GLUCOMTR-MCNC: 146 MG/DL — HIGH (ref 70–99)
GLUCOSE BLDC GLUCOMTR-MCNC: 146 MG/DL — HIGH (ref 70–99)
GLUCOSE BLDC GLUCOMTR-MCNC: 159 MG/DL — HIGH (ref 70–99)
GLUCOSE BLDC GLUCOMTR-MCNC: 159 MG/DL — HIGH (ref 70–99)
INTERPRETATION 24H UR IFE-IMP: SIGNIFICANT CHANGE UP
INTERPRETATION 24H UR IFE-IMP: SIGNIFICANT CHANGE UP

## 2023-12-14 PROCEDURE — 99231 SBSQ HOSP IP/OBS SF/LOW 25: CPT

## 2023-12-14 RX ADMIN — QUETIAPINE FUMARATE 25 MILLIGRAM(S): 200 TABLET, FILM COATED ORAL at 22:54

## 2023-12-14 RX ADMIN — CHLORHEXIDINE GLUCONATE 1 APPLICATION(S): 213 SOLUTION TOPICAL at 11:33

## 2023-12-14 RX ADMIN — ATORVASTATIN CALCIUM 40 MILLIGRAM(S): 80 TABLET, FILM COATED ORAL at 22:54

## 2023-12-14 RX ADMIN — HEPARIN SODIUM 5000 UNIT(S): 5000 INJECTION INTRAVENOUS; SUBCUTANEOUS at 22:53

## 2023-12-14 RX ADMIN — Medication 2000 UNIT(S): at 11:35

## 2023-12-14 RX ADMIN — Medication 1 TABLET(S): at 11:35

## 2023-12-14 RX ADMIN — HEPARIN SODIUM 5000 UNIT(S): 5000 INJECTION INTRAVENOUS; SUBCUTANEOUS at 11:34

## 2023-12-14 RX ADMIN — HEPARIN SODIUM 5000 UNIT(S): 5000 INJECTION INTRAVENOUS; SUBCUTANEOUS at 06:01

## 2023-12-14 RX ADMIN — POLYETHYLENE GLYCOL 3350 17 GRAM(S): 17 POWDER, FOR SOLUTION ORAL at 11:34

## 2023-12-14 RX ADMIN — SENNA PLUS 1 TABLET(S): 8.6 TABLET ORAL at 22:54

## 2023-12-14 NOTE — PROGRESS NOTE ADULT - SUBJECTIVE AND OBJECTIVE BOX
PROGRESS NOTE:     Patient is a 63y old  Female who presents with a chief complaint of le edema/pain (13 Dec 2023 19:01)      SUBJECTIVE / OVERNIGHT EVENTS: no acute event overnight. Feeling fine.    ADDITIONAL REVIEW OF SYSTEMS:    MEDICATIONS  (STANDING):  atorvastatin 40 milliGRAM(s) Oral at bedtime  chlorhexidine 2% Cloths 1 Application(s) Topical daily  cholecalciferol 2000 Unit(s) Oral daily  dextrose 50% Injectable 25 Gram(s) IV Push once  dextrose 50% Injectable 12.5 Gram(s) IV Push once  dextrose 50% Injectable 25 Gram(s) IV Push once  glucagon  Injectable 1 milliGRAM(s) IntraMuscular once  glucagon  Injectable 1 milliGRAM(s) IntraMuscular once  heparin   Injectable 5000 Unit(s) SubCutaneous every 8 hours  influenza   Vaccine 0.5 milliLiter(s) IntraMuscular once  insulin lispro (ADMELOG) corrective regimen sliding scale   SubCutaneous three times a day before meals  insulin lispro (ADMELOG) corrective regimen sliding scale   SubCutaneous at bedtime  multivitamin 1 Tablet(s) Oral daily  polyethylene glycol 3350 17 Gram(s) Oral daily  QUEtiapine 25 milliGRAM(s) Oral at bedtime  senna 1 Tablet(s) Oral at bedtime    MEDICATIONS  (PRN):  acetaminophen     Tablet .. 650 milliGRAM(s) Oral every 6 hours PRN Temp greater or equal to 38C (100.4F), Mild Pain (1 - 3)  aluminum hydroxide/magnesium hydroxide/simethicone Suspension 30 milliLiter(s) Oral every 4 hours PRN Dyspepsia  dextrose Oral Gel 15 Gram(s) Oral once PRN Blood Glucose LESS THAN 70 milliGRAM(s)/deciliter  dextrose Oral Gel 15 Gram(s) Oral once PRN Blood Glucose LESS THAN 70 milliGRAM(s)/deciliter  melatonin 3 milliGRAM(s) Oral at bedtime PRN Insomnia      CAPILLARY BLOOD GLUCOSE      POCT Blood Glucose.: 111 mg/dL (14 Dec 2023 17:19)  POCT Blood Glucose.: 146 mg/dL (14 Dec 2023 12:38)  POCT Blood Glucose.: 124 mg/dL (14 Dec 2023 08:22)  POCT Blood Glucose.: 151 mg/dL (13 Dec 2023 22:20)    I&O's Summary      PHYSICAL EXAM:  Vital Signs Last 24 Hrs  T(C): 37.1 (14 Dec 2023 15:44), Max: 37.1 (14 Dec 2023 15:44)  T(F): 98.8 (14 Dec 2023 15:44), Max: 98.8 (14 Dec 2023 15:44)  HR: 84 (14 Dec 2023 15:44) (73 - 84)  BP: 141/69 (14 Dec 2023 15:44) (138/71 - 141/81)  BP(mean): --  RR: 18 (14 Dec 2023 15:44) (18 - 18)  SpO2: 95% (14 Dec 2023 15:44) (95% - 100%)    Parameters below as of 14 Dec 2023 06:00  Patient On (Oxygen Delivery Method): room air        CONSTITUTIONAL: NAD, sitting up in bed comfortably   RESPIRATORY: Normal respiratory effort; lungs are clear to auscultation bilaterally  CARDIOVASCULAR: Regular rate and rhythm, normal S1 and S2, no murmur/rub/gallop  ABDOMEN: Nontender to palpation, normoactive bowel sounds, no rebound/guarding  MUSCLOSKELETAL: no clubbing or cyanosis of digits  SKIN: dry skin w/ b/l lymphedema, LE wrapped b/l  PSYCH: A+O to person, place, and time; affect appropriate    LABS:                        8.0    6.62  )-----------( 306      ( 13 Dec 2023 05:25 )             25.4     12-13    137  |  104  |  27<H>  ----------------------------<  119<H>  4.2   |  24  |  1.19    Ca    9.1      13 Dec 2023 05:25  Phos  3.8     12-13  Mg     1.80     12-13            Urinalysis Basic - ( 13 Dec 2023 05:25 )    Color: x / Appearance: x / SG: x / pH: x  Gluc: 119 mg/dL / Ketone: x  / Bili: x / Urobili: x   Blood: x / Protein: x / Nitrite: x   Leuk Esterase: x / RBC: x / WBC x   Sq Epi: x / Non Sq Epi: x / Bacteria: x          RADIOLOGY & ADDITIONAL TESTS:  Results Reviewed:   Imaging Personally Reviewed:  Electrocardiogram Personally Reviewed:    COORDINATION OF CARE:  Care Discussed with Consultants/Other Providers [Y/N]:  Prior or Outpatient Records Reviewed [Y/N]:

## 2023-12-14 NOTE — PROGRESS NOTE ADULT - ASSESSMENT
63-year-old female with past medical history of DM, HTN, HLD, chronic bilateral lower extremity lymphedema presents by EMS for lower extremity edema with malodorous discharge admitted with sepsis, r/o OM, LE cellulitis, UTI for which ID is consulted. Podiatry consulted, no plan for intervention. WC consulted, recs pending. PT recs [facility]

## 2023-12-15 LAB
GLUCOSE BLDC GLUCOMTR-MCNC: 105 MG/DL — HIGH (ref 70–99)
GLUCOSE BLDC GLUCOMTR-MCNC: 105 MG/DL — HIGH (ref 70–99)
GLUCOSE BLDC GLUCOMTR-MCNC: 117 MG/DL — HIGH (ref 70–99)
GLUCOSE BLDC GLUCOMTR-MCNC: 117 MG/DL — HIGH (ref 70–99)
GLUCOSE BLDC GLUCOMTR-MCNC: 123 MG/DL — HIGH (ref 70–99)
GLUCOSE BLDC GLUCOMTR-MCNC: 123 MG/DL — HIGH (ref 70–99)
GLUCOSE BLDC GLUCOMTR-MCNC: 154 MG/DL — HIGH (ref 70–99)
GLUCOSE BLDC GLUCOMTR-MCNC: 154 MG/DL — HIGH (ref 70–99)

## 2023-12-15 PROCEDURE — 99233 SBSQ HOSP IP/OBS HIGH 50: CPT

## 2023-12-15 RX ADMIN — HEPARIN SODIUM 5000 UNIT(S): 5000 INJECTION INTRAVENOUS; SUBCUTANEOUS at 13:17

## 2023-12-15 RX ADMIN — Medication 2000 UNIT(S): at 13:17

## 2023-12-15 RX ADMIN — Medication 1 TABLET(S): at 13:17

## 2023-12-15 RX ADMIN — HEPARIN SODIUM 5000 UNIT(S): 5000 INJECTION INTRAVENOUS; SUBCUTANEOUS at 06:55

## 2023-12-15 RX ADMIN — HEPARIN SODIUM 5000 UNIT(S): 5000 INJECTION INTRAVENOUS; SUBCUTANEOUS at 21:51

## 2023-12-15 RX ADMIN — CHLORHEXIDINE GLUCONATE 1 APPLICATION(S): 213 SOLUTION TOPICAL at 13:18

## 2023-12-15 RX ADMIN — ATORVASTATIN CALCIUM 40 MILLIGRAM(S): 80 TABLET, FILM COATED ORAL at 21:51

## 2023-12-15 RX ADMIN — POLYETHYLENE GLYCOL 3350 17 GRAM(S): 17 POWDER, FOR SOLUTION ORAL at 13:18

## 2023-12-15 RX ADMIN — QUETIAPINE FUMARATE 25 MILLIGRAM(S): 200 TABLET, FILM COATED ORAL at 21:51

## 2023-12-15 NOTE — PROGRESS NOTE ADULT - SUBJECTIVE AND OBJECTIVE BOX
PROGRESS NOTE:     Patient is a 63y old  Female who presents with a chief complaint of le edema/pain (14 Dec 2023 18:08)      SUBJECTIVE / OVERNIGHT EVENTS: no acute event overnight. Reports feeling better. Leg pain improved.     ADDITIONAL REVIEW OF SYSTEMS:    MEDICATIONS  (STANDING):  atorvastatin 40 milliGRAM(s) Oral at bedtime  chlorhexidine 2% Cloths 1 Application(s) Topical daily  cholecalciferol 2000 Unit(s) Oral daily  dextrose 50% Injectable 25 Gram(s) IV Push once  dextrose 50% Injectable 25 Gram(s) IV Push once  dextrose 50% Injectable 12.5 Gram(s) IV Push once  glucagon  Injectable 1 milliGRAM(s) IntraMuscular once  glucagon  Injectable 1 milliGRAM(s) IntraMuscular once  heparin   Injectable 5000 Unit(s) SubCutaneous every 8 hours  influenza   Vaccine 0.5 milliLiter(s) IntraMuscular once  insulin lispro (ADMELOG) corrective regimen sliding scale   SubCutaneous three times a day before meals  insulin lispro (ADMELOG) corrective regimen sliding scale   SubCutaneous at bedtime  multivitamin 1 Tablet(s) Oral daily  polyethylene glycol 3350 17 Gram(s) Oral daily  QUEtiapine 25 milliGRAM(s) Oral at bedtime  senna 1 Tablet(s) Oral at bedtime    MEDICATIONS  (PRN):  acetaminophen     Tablet .. 650 milliGRAM(s) Oral every 6 hours PRN Temp greater or equal to 38C (100.4F), Mild Pain (1 - 3)  aluminum hydroxide/magnesium hydroxide/simethicone Suspension 30 milliLiter(s) Oral every 4 hours PRN Dyspepsia  dextrose Oral Gel 15 Gram(s) Oral once PRN Blood Glucose LESS THAN 70 milliGRAM(s)/deciliter  dextrose Oral Gel 15 Gram(s) Oral once PRN Blood Glucose LESS THAN 70 milliGRAM(s)/deciliter  melatonin 3 milliGRAM(s) Oral at bedtime PRN Insomnia      CAPILLARY BLOOD GLUCOSE      POCT Blood Glucose.: 123 mg/dL (15 Dec 2023 12:20)  POCT Blood Glucose.: 117 mg/dL (15 Dec 2023 08:22)  POCT Blood Glucose.: 159 mg/dL (14 Dec 2023 22:44)  POCT Blood Glucose.: 111 mg/dL (14 Dec 2023 17:19)    I&O's Summary      PHYSICAL EXAM:  Vital Signs Last 24 Hrs  T(C): 36.7 (15 Dec 2023 12:38), Max: 37 (14 Dec 2023 22:02)  T(F): 98 (15 Dec 2023 12:38), Max: 98.6 (14 Dec 2023 22:02)  HR: 67 (15 Dec 2023 12:38) (67 - 79)  BP: 141/56 (15 Dec 2023 12:38) (112/60 - 141/56)  BP(mean): --  RR: 18 (15 Dec 2023 12:38) (18 - 18)  SpO2: 100% (15 Dec 2023 12:38) (100% - 100%)    Parameters below as of 15 Dec 2023 06:36  Patient On (Oxygen Delivery Method): room air        CONSTITUTIONAL: NAD, sitting up in bed comfortably   RESPIRATORY: Normal respiratory effort; lungs are clear to auscultation bilaterally  CARDIOVASCULAR: Regular rate and rhythm, normal S1 and S2, no murmur/rub/gallop  ABDOMEN: Nontender to palpation, normoactive bowel sounds, no rebound/guarding  MUSCLOSKELETAL: no clubbing or cyanosis of digits  SKIN: dry skin w/ b/l lymphedema, LE wrapped b/l  PSYCH: A+O to person, place, and time; affect appropriate  LABS:                      RADIOLOGY & ADDITIONAL TESTS:  Results Reviewed:   Imaging Personally Reviewed:  Electrocardiogram Personally Reviewed:    COORDINATION OF CARE:  Care Discussed with Consultants/Other Providers [Y/N]:  Prior or Outpatient Records Reviewed [Y/N]:

## 2023-12-15 NOTE — PROGRESS NOTE ADULT - PROBLEM SELECTOR PLAN 1
well developed, well nourished , in no acute distress , ambulating without difficulty , normal communication ability -pt with severe lymphedema w/ hyperkeratosis. Difficult to determine if acute infection but given increase in malodorous discharge as well as worsening pain, WBC- favor treating empirically with vanc and cefepime   -Per podiatry: XR does not correlate clinically. Leukocytosis unlikely from foot. Recommend wound care consult for lymphoedema management. No podiatric surgical intervention planned this admission   -ID initially recommended MRI of left foot to r/o OM. Per team, there has been challenges with coordinating MRI. While patient has been agreeing to go for MRI, however when transport arrives to floor, patient wants transport to wait for extended period of time and doesn't want to cooperate in a timely manner. Multiple attempts to bring patients down for MRI have been made during patient's hospitalization but unsuccessful. Discussed case with ID 12/15, ok with no MRI  -wound care consult appreciated   -completed ABX course per ID recs -pt with severe lymphedema w/ hyperkeratosis. Difficult to determine if acute infection but given increase in malodorous discharge as well as worsening pain, WBC- favor treating empirically with vanc and cefepime   -Per podiatry: XR does not correlate clinically. Leukocytosis unlikely from foot. Recommend wound care consult for lymphoedema management. No podiatric surgical intervention planned this admission   -ID initially recommended MRI of left foot to r/o OM. Per team, there has been challenges with coordinating MRI. While patient has been agreeing to go for MRI, however when transport arrived to floor, patient wanted transport to wait for extended period of time and didn't cooperate. Multiple attempts to bring patients down for MRI have been made during patient's hospitalization but unsuccessful. Discussed case with ID 12/15, ok with no MRI  -wound care consult appreciated   -completed ABX course per ID recs -pt with severe lymphedema w/ hyperkeratosis. Difficult to determine if acute infection but given increase in malodorous discharge as well as worsening pain, WBC- favor treating empirically with vanc and cefepime   -Per podiatry: XR does not correlate clinically. Leukocytosis unlikely from foot. Recommend wound care consult for lymphoedema management. No podiatric surgical intervention planned this admission   -ID initially recommended MRI of left foot to r/o OM. Per team, there has been challenges with coordinating MRI. While patient has been stating that she agrees to go for MRI during her stay, however when transport arrived to floor, patient wanted transport to wait for extended period of time and didn't cooperate. Multiple attempts to bring patients down for MRI have been made during patient's hospitalization but unsuccessful. Discussed case with ID 12/15, ok with no MRI  -wound care consult appreciated   -completed ABX course per ID recs

## 2023-12-15 NOTE — PROGRESS NOTE ADULT - TIME BILLING
Face to face encounter. Reviewed labs, vitals, imaging. Reviewed consultant recs. Discussed plan of care with patient. Documentation in sunrise.
Face to face encounter. Reviewed labs, vitals, imaging. Reviewed consultant recs. Discussed with ID and wound care. Discussed plan of care with patient. Documentation in sunrise.

## 2023-12-16 LAB
GLUCOSE BLDC GLUCOMTR-MCNC: 120 MG/DL — HIGH (ref 70–99)
GLUCOSE BLDC GLUCOMTR-MCNC: 120 MG/DL — HIGH (ref 70–99)
GLUCOSE BLDC GLUCOMTR-MCNC: 137 MG/DL — HIGH (ref 70–99)
GLUCOSE BLDC GLUCOMTR-MCNC: 137 MG/DL — HIGH (ref 70–99)
GLUCOSE BLDC GLUCOMTR-MCNC: 147 MG/DL — HIGH (ref 70–99)
GLUCOSE BLDC GLUCOMTR-MCNC: 147 MG/DL — HIGH (ref 70–99)
GLUCOSE BLDC GLUCOMTR-MCNC: 158 MG/DL — HIGH (ref 70–99)
GLUCOSE BLDC GLUCOMTR-MCNC: 158 MG/DL — HIGH (ref 70–99)

## 2023-12-16 PROCEDURE — 99231 SBSQ HOSP IP/OBS SF/LOW 25: CPT

## 2023-12-16 RX ORDER — LANOLIN ALCOHOL/MO/W.PET/CERES
1 CREAM (GRAM) TOPICAL
Qty: 0 | Refills: 0 | DISCHARGE
Start: 2023-12-16

## 2023-12-16 RX ORDER — QUETIAPINE FUMARATE 200 MG/1
1 TABLET, FILM COATED ORAL
Qty: 0 | Refills: 0 | DISCHARGE
Start: 2023-12-16

## 2023-12-16 RX ADMIN — Medication 2000 UNIT(S): at 13:21

## 2023-12-16 RX ADMIN — Medication 1 TABLET(S): at 13:21

## 2023-12-16 RX ADMIN — Medication 1: at 17:53

## 2023-12-16 RX ADMIN — SENNA PLUS 1 TABLET(S): 8.6 TABLET ORAL at 21:08

## 2023-12-16 RX ADMIN — CHLORHEXIDINE GLUCONATE 1 APPLICATION(S): 213 SOLUTION TOPICAL at 13:21

## 2023-12-16 RX ADMIN — QUETIAPINE FUMARATE 25 MILLIGRAM(S): 200 TABLET, FILM COATED ORAL at 21:07

## 2023-12-16 RX ADMIN — Medication 3 MILLIGRAM(S): at 21:07

## 2023-12-16 RX ADMIN — HEPARIN SODIUM 5000 UNIT(S): 5000 INJECTION INTRAVENOUS; SUBCUTANEOUS at 21:07

## 2023-12-16 RX ADMIN — HEPARIN SODIUM 5000 UNIT(S): 5000 INJECTION INTRAVENOUS; SUBCUTANEOUS at 13:21

## 2023-12-16 RX ADMIN — HEPARIN SODIUM 5000 UNIT(S): 5000 INJECTION INTRAVENOUS; SUBCUTANEOUS at 05:34

## 2023-12-16 RX ADMIN — ATORVASTATIN CALCIUM 40 MILLIGRAM(S): 80 TABLET, FILM COATED ORAL at 21:07

## 2023-12-16 NOTE — PROGRESS NOTE ADULT - SUBJECTIVE AND OBJECTIVE BOX
PROGRESS NOTE:     Patient is a 63y old  Female who presents with a chief complaint of le edema/pain (15 Dec 2023 17:08)      SUBJECTIVE / OVERNIGHT EVENTS: no acute event overnight. Feeling fine.    ADDITIONAL REVIEW OF SYSTEMS:    MEDICATIONS  (STANDING):  atorvastatin 40 milliGRAM(s) Oral at bedtime  chlorhexidine 2% Cloths 1 Application(s) Topical daily  cholecalciferol 2000 Unit(s) Oral daily  dextrose 50% Injectable 25 Gram(s) IV Push once  dextrose 50% Injectable 25 Gram(s) IV Push once  dextrose 50% Injectable 12.5 Gram(s) IV Push once  glucagon  Injectable 1 milliGRAM(s) IntraMuscular once  glucagon  Injectable 1 milliGRAM(s) IntraMuscular once  heparin   Injectable 5000 Unit(s) SubCutaneous every 8 hours  influenza   Vaccine 0.5 milliLiter(s) IntraMuscular once  insulin lispro (ADMELOG) corrective regimen sliding scale   SubCutaneous three times a day before meals  insulin lispro (ADMELOG) corrective regimen sliding scale   SubCutaneous at bedtime  multivitamin 1 Tablet(s) Oral daily  polyethylene glycol 3350 17 Gram(s) Oral daily  QUEtiapine 25 milliGRAM(s) Oral at bedtime  senna 1 Tablet(s) Oral at bedtime    MEDICATIONS  (PRN):  acetaminophen     Tablet .. 650 milliGRAM(s) Oral every 6 hours PRN Temp greater or equal to 38C (100.4F), Mild Pain (1 - 3)  aluminum hydroxide/magnesium hydroxide/simethicone Suspension 30 milliLiter(s) Oral every 4 hours PRN Dyspepsia  dextrose Oral Gel 15 Gram(s) Oral once PRN Blood Glucose LESS THAN 70 milliGRAM(s)/deciliter  dextrose Oral Gel 15 Gram(s) Oral once PRN Blood Glucose LESS THAN 70 milliGRAM(s)/deciliter  melatonin 3 milliGRAM(s) Oral at bedtime PRN Insomnia      CAPILLARY BLOOD GLUCOSE      POCT Blood Glucose.: 158 mg/dL (16 Dec 2023 17:28)  POCT Blood Glucose.: 137 mg/dL (16 Dec 2023 12:05)  POCT Blood Glucose.: 120 mg/dL (16 Dec 2023 08:19)  POCT Blood Glucose.: 154 mg/dL (15 Dec 2023 22:24)    I&O's Summary      PHYSICAL EXAM:  Vital Signs Last 24 Hrs  T(C): 36.8 (16 Dec 2023 14:00), Max: 36.9 (15 Dec 2023 20:54)  T(F): 98.3 (16 Dec 2023 14:00), Max: 98.4 (15 Dec 2023 20:54)  HR: 71 (16 Dec 2023 14:00) (71 - 80)  BP: 125/70 (16 Dec 2023 14:00) (120/60 - 135/75)  BP(mean): --  RR: 18 (16 Dec 2023 14:00) (18 - 18)  SpO2: 100% (16 Dec 2023 14:00) (96% - 100%)    Parameters below as of 16 Dec 2023 14:00  Patient On (Oxygen Delivery Method): room air        CONSTITUTIONAL: NAD, sitting up in bed comfortably   RESPIRATORY: Normal respiratory effort; no accessory muscle use  CARDIOVASCULAR: Regular rate and rhythm  ABDOMEN: Nontender to palpation, normoactive bowel sounds  MUSCLOSKELETAL: no clubbing or cyanosis of digits  SKIN: dry skin w/ b/l lymphedema, LE wrapped b/l  PSYCH: A+O to person, place, and time; affect appropriate    LABS:                      RADIOLOGY & ADDITIONAL TESTS:  Results Reviewed:   Imaging Personally Reviewed:  Electrocardiogram Personally Reviewed:    COORDINATION OF CARE:  Care Discussed with Consultants/Other Providers [Y/N]:  Prior or Outpatient Records Reviewed [Y/N]:

## 2023-12-16 NOTE — PROGRESS NOTE ADULT - PROBLEM SELECTOR PLAN 4
DVT ppx: hep sq    DC planning. Got auth for [facility]. Admission not picking up the phone at the rehab facility 12/16. Likely monday dc per sw/cm

## 2023-12-16 NOTE — PROGRESS NOTE ADULT - PROBLEM SELECTOR PLAN 1
-pt with severe lymphedema w/ hyperkeratosis. Difficult to determine if acute infection but given increase in malodorous discharge as well as worsening pain, WBC- favor treating empirically with vanc and cefepime   -Per podiatry: XR does not correlate clinically. Leukocytosis unlikely from foot. Recommend wound care consult for lymphoedema management. No podiatric surgical intervention planned this admission   -ID initially recommended MRI of left foot to r/o OM. Per team, there has been challenges with coordinating MRI. While patient has been stating that she agrees to go for MRI during her stay, however when transport arrived to floor, patient wanted transport to wait for extended period of time and didn't cooperate. Multiple attempts to bring patients down for MRI have been made during patient's hospitalization but unsuccessful. Discussed case with ID 12/15, ok with no MRI  -wound care consult appreciated   -completed ABX course per ID recs

## 2023-12-17 LAB
GLUCOSE BLDC GLUCOMTR-MCNC: 124 MG/DL — HIGH (ref 70–99)
GLUCOSE BLDC GLUCOMTR-MCNC: 124 MG/DL — HIGH (ref 70–99)
GLUCOSE BLDC GLUCOMTR-MCNC: 142 MG/DL — HIGH (ref 70–99)
GLUCOSE BLDC GLUCOMTR-MCNC: 142 MG/DL — HIGH (ref 70–99)
GLUCOSE BLDC GLUCOMTR-MCNC: 167 MG/DL — HIGH (ref 70–99)
GLUCOSE BLDC GLUCOMTR-MCNC: 167 MG/DL — HIGH (ref 70–99)
GLUCOSE BLDC GLUCOMTR-MCNC: 178 MG/DL — HIGH (ref 70–99)
GLUCOSE BLDC GLUCOMTR-MCNC: 178 MG/DL — HIGH (ref 70–99)

## 2023-12-17 PROCEDURE — 99231 SBSQ HOSP IP/OBS SF/LOW 25: CPT

## 2023-12-17 RX ADMIN — ATORVASTATIN CALCIUM 40 MILLIGRAM(S): 80 TABLET, FILM COATED ORAL at 21:58

## 2023-12-17 RX ADMIN — SENNA PLUS 1 TABLET(S): 8.6 TABLET ORAL at 21:58

## 2023-12-17 RX ADMIN — HEPARIN SODIUM 5000 UNIT(S): 5000 INJECTION INTRAVENOUS; SUBCUTANEOUS at 05:50

## 2023-12-17 RX ADMIN — HEPARIN SODIUM 5000 UNIT(S): 5000 INJECTION INTRAVENOUS; SUBCUTANEOUS at 21:58

## 2023-12-17 RX ADMIN — Medication 2000 UNIT(S): at 13:37

## 2023-12-17 RX ADMIN — Medication 1 TABLET(S): at 13:37

## 2023-12-17 RX ADMIN — QUETIAPINE FUMARATE 25 MILLIGRAM(S): 200 TABLET, FILM COATED ORAL at 21:58

## 2023-12-17 RX ADMIN — Medication 1: at 09:28

## 2023-12-17 NOTE — PROGRESS NOTE ADULT - SUBJECTIVE AND OBJECTIVE BOX
PROGRESS NOTE:     Patient is a 63y old  Female who presents with a chief complaint of le edema/pain (16 Dec 2023 17:52)      SUBJECTIVE / OVERNIGHT EVENTS: feeling fine. No complaints. Got out of bed and sat in rollator.    ADDITIONAL REVIEW OF SYSTEMS:    MEDICATIONS  (STANDING):  atorvastatin 40 milliGRAM(s) Oral at bedtime  chlorhexidine 2% Cloths 1 Application(s) Topical daily  cholecalciferol 2000 Unit(s) Oral daily  dextrose 50% Injectable 25 Gram(s) IV Push once  dextrose 50% Injectable 25 Gram(s) IV Push once  dextrose 50% Injectable 12.5 Gram(s) IV Push once  glucagon  Injectable 1 milliGRAM(s) IntraMuscular once  glucagon  Injectable 1 milliGRAM(s) IntraMuscular once  heparin   Injectable 5000 Unit(s) SubCutaneous every 8 hours  influenza   Vaccine 0.5 milliLiter(s) IntraMuscular once  insulin lispro (ADMELOG) corrective regimen sliding scale   SubCutaneous at bedtime  insulin lispro (ADMELOG) corrective regimen sliding scale   SubCutaneous three times a day before meals  multivitamin 1 Tablet(s) Oral daily  polyethylene glycol 3350 17 Gram(s) Oral daily  QUEtiapine 25 milliGRAM(s) Oral at bedtime  senna 1 Tablet(s) Oral at bedtime    MEDICATIONS  (PRN):  acetaminophen     Tablet .. 650 milliGRAM(s) Oral every 6 hours PRN Temp greater or equal to 38C (100.4F), Mild Pain (1 - 3)  aluminum hydroxide/magnesium hydroxide/simethicone Suspension 30 milliLiter(s) Oral every 4 hours PRN Dyspepsia  dextrose Oral Gel 15 Gram(s) Oral once PRN Blood Glucose LESS THAN 70 milliGRAM(s)/deciliter  dextrose Oral Gel 15 Gram(s) Oral once PRN Blood Glucose LESS THAN 70 milliGRAM(s)/deciliter  melatonin 3 milliGRAM(s) Oral at bedtime PRN Insomnia      CAPILLARY BLOOD GLUCOSE      POCT Blood Glucose.: 142 mg/dL (17 Dec 2023 17:29)  POCT Blood Glucose.: 124 mg/dL (17 Dec 2023 12:16)  POCT Blood Glucose.: 178 mg/dL (17 Dec 2023 09:01)  POCT Blood Glucose.: 147 mg/dL (16 Dec 2023 21:53)    I&O's Summary      PHYSICAL EXAM:  Vital Signs Last 24 Hrs  T(C): 36.7 (17 Dec 2023 14:00), Max: 36.9 (16 Dec 2023 21:07)  T(F): 98 (17 Dec 2023 14:00), Max: 98.4 (16 Dec 2023 21:07)  HR: 82 (17 Dec 2023 14:00) (82 - 84)  BP: 133/60 (17 Dec 2023 14:00) (127/69 - 133/60)  BP(mean): --  RR: 18 (17 Dec 2023 14:00) (18 - 18)  SpO2: 99% (17 Dec 2023 14:00) (98% - 99%)    Parameters below as of 17 Dec 2023 14:00  Patient On (Oxygen Delivery Method): room air        CONSTITUTIONAL: NAD, sitting up in rollator eating   RESPIRATORY: Normal respiratory effort; no accessory muscle use  CARDIOVASCULAR: Regular rate and rhythm  ABDOMEN: Nontender to palpation, normoactive bowel sounds  MUSCLOSKELETAL: no clubbing or cyanosis of digits  SKIN: dry skin w/ b/l lymphedema, LE wrapped b/l  PSYCH: A+O to person, place, and time; affect appropriate    LABS:                      RADIOLOGY & ADDITIONAL TESTS:  Results Reviewed:   Imaging Personally Reviewed:  Electrocardiogram Personally Reviewed:    COORDINATION OF CARE:  Care Discussed with Consultants/Other Providers [Y/N]:  Prior or Outpatient Records Reviewed [Y/N]:   Spontaneous, unlabored and symmetrical

## 2023-12-17 NOTE — PROVIDER CONTACT NOTE (OTHER) - REASON
Pt refuses heparin subQ (stating she's walking around), refuses wound care, stating she will do it herself.

## 2023-12-17 NOTE — PROGRESS NOTE ADULT - PROBLEM SELECTOR PROBLEM 3
Lower extremity pain, bilateral
DM (diabetes mellitus)
Lower extremity pain, bilateral
DM (diabetes mellitus)
Lower extremity pain, bilateral

## 2023-12-17 NOTE — PROGRESS NOTE ADULT - PROBLEM SELECTOR PLAN 3
-c/w low ISS before meals and at bedtime  -CC diet
-pt with severe lymphedema w/ hyperkeratosis. Difficult to determine if acute infection but given increase in malodorous discharge as well as worsening pain, WBC- favor treating empirically with vanc and cefepime   -appreciate pod recs -- will reconsult pending MR findings   -f/u MRI of left foot to r/o OM  -wound care consult appreciated   - ID consulted appreciate recs
-pt with severe lymphedema w/ hyperkeratosis. Difficult to determine if acute infection but given increase in malodorous discharge as well as worsening pain, WBC- favor treating empirically with vanc and cefepime   -appreciate pod recs -- will reconsult pending MR findings   -f/u MRI of left foot to r/o OM  -wound care consult for further recs  - ID consulted appreciate recs
-pt with severe lymphedema w/ hyperkeratosis. Difficult to determine if acute infection but given increase in malodorous discharge as well as worsening pain, WBC- favor treating empirically with vanc and cefepime   -appreciate pod recs -- will reconsult pending MR findings   -f/u MRI of left foot to r/o OM  -wound care consult for further recs  - ID consulted appreciate recs
-c/w low ISS before meals and at bedtime  -CC diet
-pt with severe lymphedema w/ hyperkeratosis. Difficult to determine if acute infection but given increase in malodorous discharge as well as worsening pain, WBC- favor treating empirically with vanc and cefepime   -appreciate pod recs -- will reconsult pending MR findings   -f/u MRI of left foot to r/o OM  -wound care consult appreciated   - ID consulted appreciate recs
-pt with severe lymphedema w/ hyperkeratosis. Difficult to determine if acute infection but given increase in malodorous discharge as well as worsening pain, WBC- favor treating empirically with vanc and cefepime   -appreciate pod recs -- will reconsult pending MR findings   -f/u MRI of left foot to r/o OM  -wound care consult for further recs  - ID consulted appreciate recs
-c/w low ISS before meals and at bedtime  -CC diet
-pt with severe lymphedema w/ hyperkeratosis. Difficult to determine if acute infection but given increase in malodorous discharge as well as worsening pain, WBC- favor treating empirically with vanc and cefepime   -appreciate pod recs -- will reconsult pending MR findings   -f/u MRI of left foot to r/o OM  -wound care consult for further recs  - ID consulted appreciate recs
-c/w low ISS before meals and at bedtime  -CC diet
-c/w low ISS before meals and at bedtime  -CC diet
-pt with severe lymphedema w/ hyperkeratosis. Difficult to determine if acute infection but given increase in malodorous discharge as well as worsening pain, WBC- favor treating empirically with vanc and cefepime   -appreciate pod recs -- will reconsult pending MR findings   -f/u MRI of left foot to r/o OM  -wound care consult appreciated   - ID consulted appreciate recs
-pt with severe lymphedema w/ hyperkeratosis. Difficult to determine if acute infection but given increase in malodorous discharge as well as worsening pain, WBC- favor treating empirically with vanc and cefepime   -appreciate pod recs -- will reconsult pending MR findings   -f/u MRI of left foot to r/o OM  -wound care consult for further recs  - ID consulted appreciate recs

## 2023-12-17 NOTE — PROVIDER CONTACT NOTE (OTHER) - ASSESSMENT
Pt c/o abdominal discomfort during attempts to defecate. Pt states the discomfort is intermittent x4 days. Pt states stool has been soft although it feels hard to pass. Last BM 12/9. Pt grimacing, no signs of distress otherwise.
Pt refuses cefepime and new IV. IV difficult to flush, pt refuses new IV and cefepime antibx, despite education.
Pt AOx3/4 w/ periods of confusion. VS stable with no acute distress noted. Patient stated, "I am tired of needles."
A&Ox4. Pt irritable/angry when this writer attempts to educate on proper wound care to prevent infection
Pt AOx3/4, VSS with no acute distress noted. Pt refusing to be "stuck again" stating that we have been sticking her all day.
Pt AOx3/4, VSS moaning in pain, stating its an 8/10.

## 2023-12-17 NOTE — PROGRESS NOTE ADULT - PROBLEM SELECTOR PROBLEM 2
HELLEN (acute kidney injury)

## 2023-12-17 NOTE — PROVIDER CONTACT NOTE (OTHER) - ACTION/TREATMENT ORDERED:
ACP notified, no FS collected.
ACP notified will see what else patient can have for pain control.
Senna at bedtime
No new order given.
Retry giving medication again in an hour

## 2023-12-17 NOTE — PROGRESS NOTE ADULT - NUTRITIONAL ASSESSMENT
This patient has been assessed with a concern for Malnutrition and has been determined to have a diagnosis/diagnoses of Morbid obesity (BMI > 40).    This patient is being managed with:   Diet Regular-  Consistent Carbohydrate {Evening Snack} (CSTCHOSN)  DASH/TLC {Sodium & Cholesterol Restricted} (DASH)  Entered: Dec 10 2023  3:21PM  

## 2023-12-17 NOTE — PROGRESS NOTE ADULT - PROVIDER SPECIALTY LIST ADULT
Hospitalist
Infectious Disease
Infectious Disease
Wound Care
Infectious Disease
Hospitalist

## 2023-12-17 NOTE — PROGRESS NOTE ADULT - PROBLEM SELECTOR PLAN 2
-last Cr ~3 weeks ago was 0.9, now close to baseline   -w/ metabolic acidosis   -possibly atn in the setting of sepsis/hypotension, lasix use vs post obstructive process  -treat for uti as above  - s/p Na bicarb gtt with improvement   -f/u renal US- Echogenic right kidney which can be seen with medical renal disease.  No hydronephrosis..  trend BMP, improved now
-last Cr ~3 weeks ago was 0.9  -w/ metabolic acidosis   -unclear cause, did not seem to improve much with IVF, will repeat labs to follow up   -possibly atn in the setting of sepsis/hypotension, lasix use vs post obstructive process  -treat for uti as above  - s/p Na bicarb gtt with improvement   -f/u urine lytes  -f/u renal US- Echogenic right kidney which can be seen with medical renal disease.  No hydronephrosis..  trend BMP, improved
-last Cr ~3 weeks ago was 0.9  -w/ metabolic acidosis   -unclear cause, did not seem to improve much with IVF  -possibly atn in the setting of sepsis/hypotension, lasix use vs post obstructive process  -treat for uti as above  - s/p Na bicarb gtt with improvement   -f/u urine lytes  -f/u renal US  -trend BMP
-last Cr ~3 weeks ago was 0.9  -w/ metabolic acidosis   -unclear cause, did not seem to improve much with IVF, will repeat labs to follow up   -possibly atn in the setting of sepsis/hypotension, lasix use vs post obstructive process  -treat for uti as above  - s/p Na bicarb gtt with improvement   -f/u urine lytes  -f/u renal US- Echogenic right kidney which can be seen with medical renal disease.  No hydronephrosis..  trend BMP, improved now
-last Cr ~3 weeks ago was 0.9, now close to baseline   -w/ metabolic acidosis   -possibly atn in the setting of sepsis/hypotension, lasix use vs post obstructive process  -treat for uti as above  - s/p Na bicarb gtt with improvement   -f/u renal US- Echogenic right kidney which can be seen with medical renal disease.  No hydronephrosis..  Improved
-last Cr ~3 weeks ago was 0.9, now close to baseline   -w/ metabolic acidosis   -possibly atn in the setting of sepsis/hypotension, lasix use vs post obstructive process  -treat for uti as above  - s/p Na bicarb gtt with improvement   -f/u renal US- Echogenic right kidney which can be seen with medical renal disease.  No hydronephrosis..  Improved
-last Cr ~3 weeks ago was 0.9, now close to baseline   -w/ metabolic acidosis   -possibly atn in the setting of sepsis/hypotension, lasix use vs post obstructive process  -treat for uti as above  - s/p Na bicarb gtt with improvement   -f/u urine lytes, replete PRN  -f/u renal US- Echogenic right kidney which can be seen with medical renal disease.  No hydronephrosis..  trend BMP, improved now
-last Cr ~3 weeks ago was 0.9  -w/ metabolic acidosis   -unclear cause, did not seem to improve much with IVF, will repeat labs to follow up   -possibly atn in the setting of sepsis/hypotension, lasix use vs post obstructive process  -treat for uti as above  - s/p Na bicarb gtt with improvement   -f/u urine lytes  -f/u renal US- Echogenic right kidney which can be seen with medical renal disease.  No hydronephrosis..  trend BMP
-last Cr ~3 weeks ago was 0.9  -w/ metabolic acidosis   -unclear cause, did not seem to improve much with IVF  -possibly atn in the setting of sepsis/hypotension, lasix use vs post obstructive process  -treat for uti as above  - s/p Na bicarb gtt with improvement   -f/u urine lytes  -f/u renal US  -trend BMP
-last Cr ~3 weeks ago was 0.9, now close to baseline   -w/ metabolic acidosis   -possibly atn in the setting of sepsis/hypotension, lasix use vs post obstructive process  -treat for uti as above  - s/p Na bicarb gtt with improvement   -f/u renal US- Echogenic right kidney which can be seen with medical renal disease.  No hydronephrosis..  Improved
-last Cr ~3 weeks ago was 0.9  -w/ metabolic acidosis   -unclear cause, did not seem to improve much with IVF, will repeat labs to follow up   -possibly atn in the setting of sepsis/hypotension, lasix use vs post obstructive process  -treat for uti as above  - s/p Na bicarb gtt with improvement   -f/u urine lytes  -f/u renal US- Echogenic right kidney which can be seen with medical renal disease.  No hydronephrosis..  trend BMP
-last Cr ~3 weeks ago was 0.9  -w/ metabolic acidosis   -unclear cause, did not seem to improve much with IVF, will repeat labs to follow up   -possibly atn in the setting of sepsis/hypotension, lasix use vs post obstructive process  -treat for uti as above  - s/p Na bicarb gtt with improvement   -f/u urine lytes  -f/u renal US- Echogenic right kidney which can be seen with medical renal disease.  No hydronephrosis..  trend BMP, improved now
-last Cr ~3 weeks ago was 0.9, now close to baseline   -w/ metabolic acidosis   -possibly atn in the setting of sepsis/hypotension, lasix use vs post obstructive process  -treat for uti as above  - s/p Na bicarb gtt with improvement   -f/u renal US- Echogenic right kidney which can be seen with medical renal disease.  No hydronephrosis..  Improved
-last Cr ~3 weeks ago was 0.9, now close to baseline   -w/ metabolic acidosis   -possibly atn in the setting of sepsis/hypotension, lasix use vs post obstructive process  -treat for uti as above  - s/p Na bicarb gtt with improvement   -f/u renal US- Echogenic right kidney which can be seen with medical renal disease.  No hydronephrosis..  trend BMP, improved now
-last Cr ~3 weeks ago was 0.9, now close to baseline   -w/ metabolic acidosis   -possibly atn in the setting of sepsis/hypotension, lasix use vs post obstructive process  -treat for uti as above  - s/p Na bicarb gtt with improvement   -f/u renal US- Echogenic right kidney which can be seen with medical renal disease.  No hydronephrosis..  trend BMP, improved now

## 2023-12-17 NOTE — PROVIDER CONTACT NOTE (OTHER) - DATE AND TIME:
17-Dec-2023 14:28
10-Dec-2023 13:15
04-Dec-2023 01:15
03-Dec-2023 22:30
07-Dec-2023 18:08
10-Dec-2023 19:03

## 2023-12-17 NOTE — PROGRESS NOTE ADULT - PROBLEM/PLAN-3
DISPLAY PLAN FREE TEXT
Spine appears normal, range of motion is not limited, no muscle or joint tenderness
DISPLAY PLAN FREE TEXT

## 2023-12-17 NOTE — PROVIDER CONTACT NOTE (OTHER) - SITUATION
Pt c/o 8/10 B/L leg pain, only PRN ordered is Tylenol.
Patient refused routine blood work
Pt c/o abdominal discomfort during attempts to defecate.
Pt refuses heparin subQ, states "I'm walking around, I don't need it; refuses wound care, stating she will do it herself. Education provided.
Pt refuses cefepime and new IV
Pt refusing bedtime FS.

## 2023-12-17 NOTE — PROGRESS NOTE ADULT - PROBLEM SELECTOR PROBLEM 4
Need for prophylactic measure
Hypotension
Need for prophylactic measure
Hypotension
Need for prophylactic measure
Hypotension
Need for prophylactic measure
Need for prophylactic measure
Hypotension

## 2023-12-17 NOTE — PROGRESS NOTE ADULT - PROBLEM SELECTOR PROBLEM 1
Sepsis
Lower extremity pain, bilateral
Sepsis
Lower extremity pain, bilateral
Sepsis
Sepsis
Lower extremity pain, bilateral
Sepsis

## 2023-12-17 NOTE — PROGRESS NOTE ADULT - REASON FOR ADMISSION
le edema/pain

## 2023-12-18 ENCOUNTER — TRANSCRIPTION ENCOUNTER (OUTPATIENT)
Age: 63
End: 2023-12-18

## 2023-12-18 VITALS
SYSTOLIC BLOOD PRESSURE: 90 MMHG | RESPIRATION RATE: 18 BRPM | DIASTOLIC BLOOD PRESSURE: 52 MMHG | TEMPERATURE: 98 F | OXYGEN SATURATION: 100 % | HEART RATE: 84 BPM

## 2023-12-18 LAB
GLUCOSE BLDC GLUCOMTR-MCNC: 119 MG/DL — HIGH (ref 70–99)
GLUCOSE BLDC GLUCOMTR-MCNC: 119 MG/DL — HIGH (ref 70–99)
GLUCOSE BLDC GLUCOMTR-MCNC: 123 MG/DL — HIGH (ref 70–99)
GLUCOSE BLDC GLUCOMTR-MCNC: 123 MG/DL — HIGH (ref 70–99)
GLUCOSE BLDC GLUCOMTR-MCNC: 149 MG/DL — HIGH (ref 70–99)
GLUCOSE BLDC GLUCOMTR-MCNC: 149 MG/DL — HIGH (ref 70–99)

## 2023-12-18 PROCEDURE — 99239 HOSP IP/OBS DSCHRG MGMT >30: CPT

## 2023-12-18 RX ADMIN — POLYETHYLENE GLYCOL 3350 17 GRAM(S): 17 POWDER, FOR SOLUTION ORAL at 18:48

## 2023-12-18 RX ADMIN — Medication 2000 UNIT(S): at 18:48

## 2023-12-18 RX ADMIN — Medication 1 TABLET(S): at 18:48

## 2023-12-18 NOTE — DISCHARGE NOTE NURSING/CASE MANAGEMENT/SOCIAL WORK - NSDCFUADDAPPT_GEN_ALL_CORE_FT
Upon discharge follow up at outpatient [facility] [place]. [address] [facility]. [phone].   Upon discharge follow up at outpatient [facility] [place]. [address] [facility]. [phone].

## 2023-12-18 NOTE — DISCHARGE NOTE NURSING/CASE MANAGEMENT/SOCIAL WORK - NSDPFAC_GEN_ALL_CORE
[facility] @[address] [place] [id number] [facility] @[address] [place] [id number]

## 2023-12-18 NOTE — CHART NOTE - NSCHARTNOTEFT_GEN_A_CORE
Source: Patient A&Ox [4]      other [x] Chart Review     Medical Course: 63-year-old female with past medical history of DM, HTN, HLD, chronic bilateral lower extremity lymphedema presents by EMS for lower extremity edema with malodorous discharge admitted with sepsis, r/o OM, LE cellulitis, UTI for which ID is consulted. Podiatry consulted, no plan for intervention. WC consulted, recs pending. PT recs HOMER.     Nutrition Course: Patient seen at bedside. Reports her appetite "is not so great" in hospital. As per RN flow sheet, Pt's PO intake varies from 26-75%. Food preferences explored and honored to encourage PO intake. Denies chewing and swallowing difficulties. Denies any GI distress (nausea/vomiting/diarrhea/constipation.) Last BM today as per pt. Bowel regimen is in use. Pt continues on MVI, Vitamin D supplement for micronutrient coverage.  Pt noted with 1+ generalized and 2+ b/l LE edema as per RN flow sheet. Pt has no nutrition related questions nor concerns. RD to remain available for further nutritional interventions as indicated. RD to remain available for further nutritional interventions as indicated.          Diet, Regular:   Consistent Carbohydrate {Evening Snack} (CSTCHOSN)  DASH/TLC {Sodium & Cholesterol Restricted} (DASH) (12-10-23 @ 15:22)      GI: WDL. Last BM 12/18    PO intake: 26-75% per RN flow sheet     Anthropometrics: Height (cm): 165.1 (12-02), 165.1 (11-08), 165.1 (10-31)  Weight (kg): 117 (12-02), 98.9 (11-08), 129.682 (10-31)  BMI (kg/m2): 42.9 (12-02), 36.3 (11-08), 47.6 (10-31)    Edema: 1+ generalized & 2+ B/L LE   Pressure Injuries: 1+ generalized, and 2+ b/l LE edema     __________________ Pertinent Medications__________________   MEDICATIONS  (STANDING):  atorvastatin 40 milliGRAM(s) Oral at bedtime  chlorhexidine 2% Cloths 1 Application(s) Topical daily  cholecalciferol 2000 Unit(s) Oral daily  dextrose 50% Injectable 25 Gram(s) IV Push once  dextrose 50% Injectable 25 Gram(s) IV Push once  dextrose 50% Injectable 12.5 Gram(s) IV Push once  glucagon  Injectable 1 milliGRAM(s) IntraMuscular once  glucagon  Injectable 1 milliGRAM(s) IntraMuscular once  heparin   Injectable 5000 Unit(s) SubCutaneous every 8 hours  influenza   Vaccine 0.5 milliLiter(s) IntraMuscular once  insulin lispro (ADMELOG) corrective regimen sliding scale   SubCutaneous at bedtime  insulin lispro (ADMELOG) corrective regimen sliding scale   SubCutaneous three times a day before meals  multivitamin 1 Tablet(s) Oral daily  polyethylene glycol 3350 17 Gram(s) Oral daily  QUEtiapine 25 milliGRAM(s) Oral at bedtime  senna 1 Tablet(s) Oral at bedtime    MEDICATIONS  (PRN):  acetaminophen     Tablet .. 650 milliGRAM(s) Oral every 6 hours PRN Temp greater or equal to 38C (100.4F), Mild Pain (1 - 3)  aluminum hydroxide/magnesium hydroxide/simethicone Suspension 30 milliLiter(s) Oral every 4 hours PRN Dyspepsia  dextrose Oral Gel 15 Gram(s) Oral once PRN Blood Glucose LESS THAN 70 milliGRAM(s)/deciliter  dextrose Oral Gel 15 Gram(s) Oral once PRN Blood Glucose LESS THAN 70 milliGRAM(s)/deciliter  melatonin 3 milliGRAM(s) Oral at bedtime PRN Insomnia      __________________ Pertinent Labs__________________    12-13 Phos 3.8 mg/dL 12-03 Chol 103 mg/dL LDL --    HDL 21 mg/dL<L> Trig 92 mg/dL        POCT Blood Glucose.: 149 mg/dL (12-18-23 @ 12:25)  POCT Blood Glucose.: 123 mg/dL (12-18-23 @ 08:46)  POCT Blood Glucose.: 167 mg/dL (12-17-23 @ 22:04)  POCT Blood Glucose.: 142 mg/dL (12-17-23 @ 17:29)  POCT Blood Glucose.: 124 mg/dL (12-17-23 @ 12:16)  POCT Blood Glucose.: 178 mg/dL (12-17-23 @ 09:01)  POCT Blood Glucose.: 147 mg/dL (12-16-23 @ 21:53)  POCT Blood Glucose.: 158 mg/dL (12-16-23 @ 17:28)  POCT Blood Glucose.: 137 mg/dL (12-16-23 @ 12:05)  POCT Blood Glucose.: 120 mg/dL (12-16-23 @ 08:19)  POCT Blood Glucose.: 154 mg/dL (12-15-23 @ 22:24)  POCT Blood Glucose.: 105 mg/dL (12-15-23 @ 17:29)          Estimated Needs:   [ x] no change since previous assessment      Previous Nutrition Diagnosis: Unintentional weight gain/ Overweight/Obese     Nutrition Diagnosis is [ x] ongoing        Recommendations:  1) Recommend continue with current diet, which remains appropriate at this time.   2) Monitor PO intake, Labs, weights, BMs, and skin integrity.   3) RD to remain available for further nutritional interventions as indicated.       Monitoring and Evaluation:      [ x] Tolerance to diet prescription [x ] weights [x ] follow up per protocol  [ ] other:

## 2023-12-18 NOTE — DISCHARGE NOTE NURSING/CASE MANAGEMENT/SOCIAL WORK - OTHER MODE OF TRANSPORTATION
[facility] @ PM (trip #[phone]) [facility] @ PM (trip #[phone]) Pending vendor @ 4pm (trip #[phone]) Pending vendor @ 4pm (trip #[phone]) [facility] @ 7:45 PM (trip #[id number])   [facility] @ 7:45 PM (trip #[phone])

## 2023-12-18 NOTE — DISCHARGE NOTE NURSING/CASE MANAGEMENT/SOCIAL WORK - PATIENT PORTAL LINK FT
You can access the FollowMyHealth Patient Portal offered by [facility] by registering at the following website: [email or web address]. By joining [employer]’s FollowMyHealth portal, you will also be able to view your health information using other applications (apps) compatible with our system. You can access the FollowMyHealth Patient Portal offered by [facility] by registering at the following website: [email or web address]. By joining [employer]’s FollowMyHealth portal, you will also be able to view your health information using other applications (apps) compatible with our system.

## 2023-12-18 NOTE — DISCHARGE NOTE NURSING/CASE MANAGEMENT/SOCIAL WORK - NSDCPEFALRISK_GEN_ALL_CORE
For information on Fall & Injury Prevention, visit: [email or web address] OR  [email or web address] OR  https://www.cdc.gov/steadi/patient.html For information on Fall & Injury Prevention, visit: [email or web address] OR  [email or web address] OR  https://www.cdc.gov/steadi/patient.html

## 2024-02-19 NOTE — ED ADULT TRIAGE NOTE - BMI (KG/M2)
47.6 [Healthy Appearing] : healthy appearing [Well Nourished] : well nourished [Interactive] : interactive [Normal Appearance] : normal appearance [Well formed] : well formed [Normally Set] : normally set [Normal S1 and S2] : normal S1 and S2 [Clear to Ausculation Bilaterally] : clear to auscultation bilaterally [Abdomen Soft] : soft [Abdomen Tenderness] : non-tender [] : no hepatosplenomegaly [Normal] : normal  [Murmur] : no murmurs

## 2024-02-23 NOTE — ED ADULT NURSE REASSESSMENT NOTE - AS TEMP SITE
Received request via: Patient    Was the patient seen in the last year in this department? Yes    Does the patient have an active prescription (recently filled or refills available) for medication(s) requested? No      Does the patient have [facility] and need 100 day supply (blood pressure, diabetes and cholesterol meds only)? Patient does not have SCP     rectal

## 2024-04-26 NOTE — PROVIDER CONTACT NOTE (OTHER) - BACKGROUND
Pt came in for acute renal failure
Pt admitted for acute renal failure, chronic weeping lymphedema and left foot bulla. PMHx DM and HTN.
Pt admitted with b/l LE lymphedema, acute renal failure, h/o HTN, DM
Pt came in for BLE lymphedema, DX acute renal failure.
Pt admitted with Acute renal failure with B/L LE lymphodema and blisters. PMHx DM and HTN.
Pt admitted for acute renal failure, chronic wheeping lymphodema and left foot bulla. PMHx DM and HTN.
Mallampati Score - 4
partial upper dentures. Denies loose teeth.

## 2024-05-13 NOTE — ED ADULT NURSE REASSESSMENT NOTE - GENERAL PATIENT STATE
LOV 7/6/24  
no change observed
no change observed
drowsy
comfortable appearance
comfortable appearance/resting/sleeping

## 2024-08-02 NOTE — DISCHARGE NOTE NURSING/CASE MANAGEMENT/SOCIAL WORK - NSPROMEDSBROUGHTTOHOSP_GEN_A_NUR
Clinic Administered Medication Documentation    Patient was given LET. Prior to medication administration, verified patient's identity using patient's name and date of birth.    Shawn Abraham MA      no

## 2025-07-10 NOTE — PATIENT PROFILE ADULT - [id number]
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 The pt developed immunotherapy-induced colitis 3 weeks ago at which point the Keytruda was stopped and he was started on a Prednisone taper. Initial dosage of Prednisone at 60mg and currently tapered down to 30mg. After onset of BLE swelling, Lasix dosage was increased to 60mg. The pt also notes that he used Lidocaine cream for his leg pain with significant relief. After noting no change in BLE swelling, pt presented to the ED for further treatment.    ED course: Pt presented with bilateral 3+ pitting edema on exam. LE venous duplex ruled out DVT b/l. Labs showed hypokalemia (2.8) and pre-renal MARYAN (BUN/Cr 67/1.65). Heme/onc was consulted and pt was admitted to medicine for management of BLE swelling and pre-renal MARAYN.    OVERNIGHT EVENTS:  Overnight events: Pt was treated with ACE wraps and given PO+IV potassium replacement.    Social History:  Lives with brother  Ambulates with cane for the past 2 weeks  Does not drink alcohol   Endorses 40 pack years smoking hx (smoked 2 ppd x 20 yrs, stopped 10 yrs ago)  Endorses smoking marijuana x 10 yrs (stopped 20 yrs ago)    INTERVAL EVENTS:   No acute events overnight.    SUBJECTIVE:   Patient seen and examined at bedside. Condition improved from yesterday.     ROS:  Positive for chronic cough. Negative for SOB, fever, chills, chest pain, abdominal pain, diarrhea, dysuria.    VITAL SIGNS:  Vital Signs Last 24 Hrs  T(C): 36.4 (09 Jul 2025 05:50), Max: 36.6 (08 Jul 2025 17:33)  T(F): 97.5 (09 Jul 2025 05:50), Max: 97.9 (08 Jul 2025 17:33)  HR: 77 (09 Jul 2025 05:50) (75 - 82)  BP: 164/81 (09 Jul 2025 05:50) (125/79 - 164/81)  BP(mean): 106 (08 Jul 2025 22:48) (106 - 106)  RR: 18 (09 Jul 2025 05:50) (18 - 18)  SpO2: 92% (09 Jul 2025 05:50) (92% - 95%)    Parameters below as of 08 Jul 2025 22:48  Patient On (Oxygen Delivery Method): room air    PHYSICAL EXAM:  Constitutional: AOx3. No acute distress. Resting comfortably in bed.  HEENT: Atraumatic. PERRL. EOMI. Clear conjunctiva. Moist mucous membranes.  Neck: Trachea midline. Supple.  Lungs: Mild rales noted at apex of lungs b/l. Lungs otherwise clear to auscultation b/l. No accessory muscle use. No wheezing. No stridor/retracting/tripoding.   Cardiovascular: Regular rate and rhythm. No murmurs, rubs, or gallops.  Abdomen: Soft, non-tender, non-distended. No rebound or guarding.    Extremities: 3+ b/l pitting edema ankle to shin + erythema w/o warmth.  Skin: Multiple skin wounds noted to b/l ankles. No pus noted.  Neurologic: No apparent focal neurological deficits. Answering questions, following commands, moving all extremities.   Psychiatric: Cooperative. Appropriate mood and affect.     MEDICATIONS  (STANDING):  aspirin enteric coated 81 milliGRAM(s) Oral daily  atorvastatin 80 milliGRAM(s) Oral at bedtime  dextrose 5%. 1000 milliLiter(s) (100 mL/Hr) IV Continuous <Continuous>  dextrose 5%. 1000 milliLiter(s) (50 mL/Hr) IV Continuous <Continuous>  dextrose 50% Injectable 25 Gram(s) IV Push once  dextrose 50% Injectable 12.5 Gram(s) IV Push once  dextrose 50% Injectable 25 Gram(s) IV Push once  ezetimibe 10 milliGRAM(s) Oral daily  glucagon  Injectable 1 milliGRAM(s) IntraMuscular once  heparin   Injectable 5000 Unit(s) SubCutaneous every 8 hours  insulin lispro (ADMELOG) corrective regimen sliding scale   SubCutaneous three times a day before meals  insulin lispro Injectable (ADMELOG) 4 Unit(s) SubCutaneous three times a day before meals  metoprolol succinate  milliGRAM(s) Oral daily  pantoprazole    Tablet 40 milliGRAM(s) Oral before breakfast  potassium chloride  10 mEq/100 mL IVPB 10 milliEquivalent(s) IV Intermittent every 1 hour  potassium phosphate IVPB 30 milliMole(s) IV Intermittent once  predniSONE   Tablet 30 milliGRAM(s) Oral every 24 hours  trimethoprim   80 mG/sulfamethoxazole 400 mG 1 Tablet(s) Oral daily    MEDICATIONS  (PRN):  acetaminophen     Tablet .. 650 milliGRAM(s) Oral every 6 hours PRN Mild Pain (1 - 3)  albuterol    90 MICROgram(s) HFA Inhaler 2 Puff(s) Inhalation every 6 hours PRN Shortness of Breath and/or Wheezing  dextrose Oral Gel 15 Gram(s) Oral once PRN Blood Glucose LESS THAN 70 milliGRAM(s)/deciliter    ALLERGIES:  NKDA, NKFA, [employer]KE[employer]    POCT Blood Glucose.: 202 mg/dL (09 Jul 2025 08:23)  POCT Blood Glucose.: 239 mg/dL (08 Jul 2025 23:48)    LABS:                        12.3   18.84 )-----------( 251      ( 09 Jul 2025 05:30 )             37.6      07-09    143  |  97  |  56[H]  ----------------------------<  236[H]  2.9[LL]   |  34[H]  |  1.38[H]    Ca    8.6      09 Jul 2025 05:30  Phos  2.2     07-09  Mg     2.0     07-09    TPro  4.9[L]  /  Alb  2.9[L]  /  TBili  0.5  /  DBili  x   /  AST  29  /  ALT  116[H]  /  AlkPhos  75  07-09      Urinalysis Basic - ( 09 Jul 2025 05:30 )    Color: x / Appearance: x / SG: x / pH: x  Gluc: 236 mg/dL / Ketone: x  / Bili: x / Urobili: x   Blood: x / Protein: x / Nitrite: x   Leuk Esterase: x / RBC: x / WBC x   Sq Epi: x / Non Sq Epi: x / Bacteria: x      RADIOLOGY & ADDITIONAL TESTS: Reviewed. 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 The pt developed immunotherapy-induced colitis 3 weeks ago at which point the Keytruda was stopped and he was started on a Prednisone taper. Initial dosage of Prednisone at 60mg and currently tapered down to 30mg. After onset of BLE swelling, Lasix dosage was increased to 60mg. The pt also notes that he used Lidocaine cream for his leg pain with significant relief. After noting no change in BLE swelling, pt presented to the ED for further treatment.    ED course: Pt presented with bilateral 3+ pitting edema on exam. LE venous duplex ruled out DVT b/l. Labs showed hypokalemia (2.8) and pre-renal MARYAN (BUN/Cr 67/1.65). Heme/onc was consulted and pt was admitted to medicine for management of BLE swelling and pre-renal MARYAN.    Social History:  Lives with brother  Ambulates with cane for the past 2 weeks  Does not drink alcohol   Endorses 40 pack years smoking hx (smoked 2 ppd x 20 yrs, stopped 10 yrs ago)  Endorses smoking marijuana x 10 yrs (stopped 20 yrs ago)    INTERVAL EVENTS:   No acute events overnight. Potassium normalized at 4.3. Pt tested negative for C. diff.     SUBJECTIVE:   Patient seen and examined at bedside. Condition improved from yesterday. States that his last BM was yesterday. Does not have any diarrhea.    ROS:  Positive for chronic cough. Negative for SOB, fever, chills, chest pain, abdominal pain, diarrhea, dysuria.    VITAL SIGNS:  Vital Signs Last 24 Hrs  T(C): 36.4 (09 Jul 2025 05:50), Max: 36.6 (08 Jul 2025 17:33)  T(F): 97.5 (09 Jul 2025 05:50), Max: 97.9 (08 Jul 2025 17:33)  HR: 77 (09 Jul 2025 05:50) (75 - 82)  BP: 164/81 (09 Jul 2025 05:50) (125/79 - 164/81)  BP(mean): 106 (08 Jul 2025 22:48) (106 - 106)  RR: 18 (09 Jul 2025 05:50) (18 - 18)  SpO2: 92% (09 Jul 2025 05:50) (92% - 95%)    Parameters below as of 08 Jul 2025 22:48  Patient On (Oxygen Delivery Method): room air    PHYSICAL EXAM:  Constitutional: AOx3. No acute distress. Resting comfortably in bed.  HEENT: Atraumatic. PERRL. EOMI. Clear conjunctiva. Moist mucous membranes.  Neck: Trachea midline. Supple.  Lungs: Mild rales noted at apex of lungs b/l. Lungs otherwise clear to auscultation b/l. No accessory muscle use. No wheezing. No stridor/retracting/tripoding.   Cardiovascular: Regular rate and rhythm. No murmurs, rubs, or gallops.  Abdomen: Soft, non-tender, non-distended. No rebound or guarding.    Extremities: 3+ b/l LE pitting edema + erythema w/o warmth.  Skin: Multiple skin wounds noted to b/l ankles. No pus noted.  Neurologic: No apparent focal neurological deficits. Answering questions, following commands, moving all extremities.   Psychiatric: Cooperative. Appropriate mood and affect.     MEDICATIONS  (STANDING):  aspirin enteric coated 81 milliGRAM(s) Oral daily  atorvastatin 80 milliGRAM(s) Oral at bedtime  dextrose 5%. 1000 milliLiter(s) (100 mL/Hr) IV Continuous <Continuous>  dextrose 5%. 1000 milliLiter(s) (50 mL/Hr) IV Continuous <Continuous>  dextrose 50% Injectable 25 Gram(s) IV Push once  dextrose 50% Injectable 12.5 Gram(s) IV Push once  dextrose 50% Injectable 25 Gram(s) IV Push once  ezetimibe 10 milliGRAM(s) Oral daily  glucagon  Injectable 1 milliGRAM(s) IntraMuscular once  heparin   Injectable 5000 Unit(s) SubCutaneous every 8 hours  insulin lispro (ADMELOG) corrective regimen sliding scale   SubCutaneous three times a day before meals  insulin lispro Injectable (ADMELOG) 4 Unit(s) SubCutaneous three times a day before meals  metoprolol succinate  milliGRAM(s) Oral daily  pantoprazole    Tablet 40 milliGRAM(s) Oral before breakfast  potassium chloride  10 mEq/100 mL IVPB 10 milliEquivalent(s) IV Intermittent every 1 hour  potassium phosphate IVPB 30 milliMole(s) IV Intermittent once  predniSONE   Tablet 30 milliGRAM(s) Oral every 24 hours  trimethoprim   80 mG/sulfamethoxazole 400 mG 1 Tablet(s) Oral daily    MEDICATIONS  (PRN):  acetaminophen     Tablet .. 650 milliGRAM(s) Oral every 6 hours PRN Mild Pain (1 - 3)  albuterol    90 MICROgram(s) HFA Inhaler 2 Puff(s) Inhalation every 6 hours PRN Shortness of Breath and/or Wheezing  dextrose Oral Gel 15 Gram(s) Oral once PRN Blood Glucose LESS THAN 70 milliGRAM(s)/deciliter    ALLERGIES:  NKDA, NKFA, [employer]    CAPILLARY BLOOD GLUCOSE  POCT Blood Glucose.: 290 mg/dL (10 Jul 2025 12:18)  POCT Blood Glucose.: 202 mg/dL (09 Jul 2025 08:23)  POCT Blood Glucose.: 239 mg/dL (08 Jul 2025 23:48)    LABS:                        12.3   18.84 )-----------( 251      ( 09 Jul 2025 05:30 )             37.6      07-09    143  |  97  |  56[H]  ----------------------------<  236[H]  2.9[LL]   |  34[H]  |  1.38[H]    Ca    8.6      09 Jul 2025 05:30  Phos  2.2     07-09  Mg     2.0     07-09    TPro  4.9[L]  /  Alb  2.9[L]  /  TBili  0.5  /  DBili  x   /  AST  29  /  ALT  116[H]  /  AlkPhos  75  07-09      Urinalysis Basic - ( 09 Jul 2025 05:30 )    Color: x / Appearance: x / SG: x / pH: x  Gluc: 236 mg/dL / Ketone: x  / Bili: x / Urobili: x   Blood: x / Protein: x / Nitrite: x   Leuk Esterase: x / RBC: x / WBC x   Sq Epi: x / Non Sq Epi: x / Bacteria: x      RADIOLOGY & ADDITIONAL TESTS: Reviewed. 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 The pt developed immunotherapy-induced colitis 3 weeks ago at which point the Keytruda was stopped and he was started on a Prednisone taper. Initial dosage of Prednisone at 60mg and currently tapered down to 30mg. After onset of BLE swelling, Lasix dosage was increased to 60mg. The pt also notes that he used Lidocaine cream for his leg pain with significant relief. After noting no change in BLE swelling, pt presented to the ED for further treatment.    Social History:  Lives with brother  Ambulates with cane for the past 2 weeks  Does not drink alcohol   Endorses 40 pack years smoking hx (smoked 2 ppd x 20 yrs, stopped 10 yrs ago)  Endorses smoking marijuana x 10 yrs (stopped 20 yrs ago)    INTERVAL EVENTS:   No acute events overnight. Potassium normalized at 4.3. Pt tested negative for C. diff.    SUBJECTIVE:   Patient seen and examined at bedside. Condition improved from yesterday. States that his last BM was yesterday. Does not have any diarrhea.    ROS:  Positive for chronic cough. Negative for SOB, fever, chills, chest pain, abdominal pain, diarrhea, dysuria.    VITAL SIGNS:  Vital Signs Last 24 Hrs  T(C): 36.4 (09 Jul 2025 05:50), Max: 36.6 (08 Jul 2025 17:33)  T(F): 97.5 (09 Jul 2025 05:50), Max: 97.9 (08 Jul 2025 17:33)  HR: 77 (09 Jul 2025 05:50) (75 - 82)  BP: 164/81 (09 Jul 2025 05:50) (125/79 - 164/81)  BP(mean): 106 (08 Jul 2025 22:48) (106 - 106)  RR: 18 (09 Jul 2025 05:50) (18 - 18)  SpO2: 92% (09 Jul 2025 05:50) (92% - 95%)    Parameters below as of 08 Jul 2025 22:48  Patient On (Oxygen Delivery Method): room air    PHYSICAL EXAM:  Constitutional: AOx3. No acute distress. Resting comfortably in bed.  HEENT: Atraumatic. PERRL. EOMI. Clear conjunctiva. Moist mucous membranes.  Neck: Trachea midline. Supple.  Lungs: Mild rales noted at apex of lungs b/l. Lungs otherwise clear to auscultation b/l. No accessory muscle use. No wheezing. No stridor/retracting/tripoding.   Cardiovascular: Regular rate and rhythm. No murmurs, rubs, or gallops.  Abdomen: Soft, non-tender, non-distended. No rebound or guarding.    Extremities: 3+ b/l LE pitting edema + erythema w/o warmth.  Skin: Multiple skin wounds noted to b/l ankles. No pus noted.  Neurologic: No apparent focal neurological deficits. Answering questions, following commands, moving all extremities.   Psychiatric: Cooperative. Appropriate mood and affect.     MEDICATIONS  (STANDING):  aspirin enteric coated 81 milliGRAM(s) Oral daily  atorvastatin 80 milliGRAM(s) Oral at bedtime  dextrose 5%. 1000 milliLiter(s) (100 mL/Hr) IV Continuous <Continuous>  dextrose 5%. 1000 milliLiter(s) (50 mL/Hr) IV Continuous <Continuous>  dextrose 50% Injectable 25 Gram(s) IV Push once  dextrose 50% Injectable 12.5 Gram(s) IV Push once  dextrose 50% Injectable 25 Gram(s) IV Push once  ezetimibe 10 milliGRAM(s) Oral daily  glucagon  Injectable 1 milliGRAM(s) IntraMuscular once  heparin   Injectable 5000 Unit(s) SubCutaneous every 8 hours  insulin lispro (ADMELOG) corrective regimen sliding scale   SubCutaneous three times a day before meals  insulin lispro Injectable (ADMELOG) 4 Unit(s) SubCutaneous three times a day before meals  metoprolol succinate  milliGRAM(s) Oral daily  pantoprazole    Tablet 40 milliGRAM(s) Oral before breakfast  potassium chloride  10 mEq/100 mL IVPB 10 milliEquivalent(s) IV Intermittent every 1 hour  potassium phosphate IVPB 30 milliMole(s) IV Intermittent once  predniSONE   Tablet 30 milliGRAM(s) Oral every 24 hours  trimethoprim   80 mG/sulfamethoxazole 400 mG 1 Tablet(s) Oral daily    MEDICATIONS  (PRN):  acetaminophen     Tablet .. 650 milliGRAM(s) Oral every 6 hours PRN Mild Pain (1 - 3)  albuterol    90 MICROgram(s) HFA Inhaler 2 Puff(s) Inhalation every 6 hours PRN Shortness of Breath and/or Wheezing  dextrose Oral Gel 15 Gram(s) Oral once PRN Blood Glucose LESS THAN 70 milliGRAM(s)/deciliter    ALLERGIES:  NKDA, NKFA, [employer]KE[employer]    CAPILLARY BLOOD GLUCOSE  POCT Blood Glucose.: 290 mg/dL (10 Jul 2025 12:18)  POCT Blood Glucose.: 202 mg/dL (09 Jul 2025 08:23)  POCT Blood Glucose.: 239 mg/dL (08 Jul 2025 23:48)    LABS:                        12.3   18.84 )-----------( 251      ( 09 Jul 2025 05:30 )             37.6      07-09    143  |  97  |  56[H]  ----------------------------<  236[H]  2.9[LL]   |  34[H]  |  1.38[H]    Ca    8.6      09 Jul 2025 05:30  Phos  2.2     07-09  Mg     2.0     07-09    TPro  4.9[L]  /  Alb  2.9[L]  /  TBili  0.5  /  DBili  x   /  AST  29  /  ALT  116[H]  /  AlkPhos  75  07-09      Urinalysis Basic - ( 09 Jul 2025 05:30 )    Color: x / Appearance: x / SG: x / pH: x  Gluc: 236 mg/dL / Ketone: x  / Bili: x / Urobili: x   Blood: x / Protein: x / Nitrite: x   Leuk Esterase: x / RBC: x / WBC x   Sq Epi: x / Non Sq Epi: x / Bacteria: x      RADIOLOGY & ADDITIONAL TESTS: Reviewed. 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 The pt developed immunotherapy-induced colitis 3 weeks ago at which point the Keytruda was stopped and he was started on a Prednisone taper. Initial dosage of Prednisone at 60mg and currently tapered down to 30mg. After onset of BLE swelling, Lasix dosage was increased to 60mg. The pt also notes that he used Lidocaine cream for his leg pain with significant relief. After noting no change in BLE swelling, pt presented to the ED for further treatment.    Social History:  Lives with brother  Ambulates with cane for the past 2 weeks  Does not drink alcohol   Endorses 40 pack years smoking hx (smoked 2 ppd x 20 yrs, stopped 10 yrs ago)  Endorses smoking marijuana x 10 yrs (stopped 20 yrs ago)    INTERVAL EVENTS:   No acute events overnight. Potassium normalized at 5.1. Pt tested negative for C. diff.    SUBJECTIVE:   Patient seen and examined at bedside. Condition improved from yesterday. States that his last BM was yesterday. Does not have any diarrhea.    ROS:  Positive for chronic cough. Negative for SOB, fever, chills, chest pain, abdominal pain, diarrhea, dysuria.    VITAL SIGNS:  Vital Signs Last 24 Hrs  T(C): 36.4 (09 Jul 2025 05:50), Max: 36.6 (08 Jul 2025 17:33)  T(F): 97.5 (09 Jul 2025 05:50), Max: 97.9 (08 Jul 2025 17:33)  HR: 77 (09 Jul 2025 05:50) (75 - 82)  BP: 164/81 (09 Jul 2025 05:50) (125/79 - 164/81)  BP(mean): 106 (08 Jul 2025 22:48) (106 - 106)  RR: 18 (09 Jul 2025 05:50) (18 - 18)  SpO2: 92% (09 Jul 2025 05:50) (92% - 95%)    Parameters below as of 08 Jul 2025 22:48  Patient On (Oxygen Delivery Method): room air    PHYSICAL EXAM:  Constitutional: AOx3. No acute distress. Resting comfortably in bed.  HEENT: Atraumatic. PERRL. EOMI. Clear conjunctiva. Moist mucous membranes.  Neck: Trachea midline. Supple.  Lungs: Mild rales noted at apex of lungs b/l. Lungs otherwise clear to auscultation b/l. No accessory muscle use. No wheezing. No stridor/retracting/tripoding.   Cardiovascular: Regular rate and rhythm. No murmurs, rubs, or gallops.  Abdomen: Soft, non-tender, non-distended. No rebound or guarding.    Extremities: 3+ b/l LE pitting edema + erythema w/o warmth.  Skin: Multiple skin wounds noted to b/l ankles. No pus noted.  Neurologic: No apparent focal neurological deficits. Answering questions, following commands, moving all extremities.   Psychiatric: Cooperative. Appropriate mood and affect.     MEDICATIONS  (STANDING):  aspirin enteric coated 81 milliGRAM(s) Oral daily  atorvastatin 80 milliGRAM(s) Oral at bedtime  dextrose 5%. 1000 milliLiter(s) (100 mL/Hr) IV Continuous <Continuous>  dextrose 5%. 1000 milliLiter(s) (50 mL/Hr) IV Continuous <Continuous>  dextrose 50% Injectable 25 Gram(s) IV Push once  dextrose 50% Injectable 12.5 Gram(s) IV Push once  dextrose 50% Injectable 25 Gram(s) IV Push once  ezetimibe 10 milliGRAM(s) Oral daily  glucagon  Injectable 1 milliGRAM(s) IntraMuscular once  heparin   Injectable 5000 Unit(s) SubCutaneous every 8 hours  insulin lispro (ADMELOG) corrective regimen sliding scale   SubCutaneous three times a day before meals  insulin lispro Injectable (ADMELOG) 4 Unit(s) SubCutaneous three times a day before meals  metoprolol succinate  milliGRAM(s) Oral daily  pantoprazole    Tablet 40 milliGRAM(s) Oral before breakfast  potassium chloride  10 mEq/100 mL IVPB 10 milliEquivalent(s) IV Intermittent every 1 hour  potassium phosphate IVPB 30 milliMole(s) IV Intermittent once  predniSONE   Tablet 30 milliGRAM(s) Oral every 24 hours  trimethoprim   80 mG/sulfamethoxazole 400 mG 1 Tablet(s) Oral daily    MEDICATIONS  (PRN):  acetaminophen     Tablet .. 650 milliGRAM(s) Oral every 6 hours PRN Mild Pain (1 - 3)  albuterol    90 MICROgram(s) HFA Inhaler 2 Puff(s) Inhalation every 6 hours PRN Shortness of Breath and/or Wheezing  dextrose Oral Gel 15 Gram(s) Oral once PRN Blood Glucose LESS THAN 70 milliGRAM(s)/deciliter    ALLERGIES:  NKDA, NKFA, [employer]KE[employer]    CAPILLARY BLOOD GLUCOSE  POCT Blood Glucose.: 290 mg/dL (10 Jul 2025 12:18)  POCT Blood Glucose.: 202 mg/dL (09 Jul 2025 08:23)  POCT Blood Glucose.: 239 mg/dL (08 Jul 2025 23:48)    LABS:                        12.3   18.84 )-----------( 251      ( 09 Jul 2025 05:30 )             37.6      07-09    143  |  97  |  56[H]  ----------------------------<  236[H]  2.9[LL]   |  34[H]  |  1.38[H]    Ca    8.6      09 Jul 2025 05:30  Phos  2.2     07-09  Mg     2.0     07-09    TPro  4.9[L]  /  Alb  2.9[L]  /  TBili  0.5  /  DBili  x   /  AST  29  /  ALT  116[H]  /  AlkPhos  75  07-09      Urinalysis Basic - ( 09 Jul 2025 05:30 )    Color: x / Appearance: x / SG: x / pH: x  Gluc: 236 mg/dL / Ketone: x  / Bili: x / Urobili: x   Blood: x / Protein: x / Nitrite: x   Leuk Esterase: x / RBC: x / WBC x   Sq Epi: x / Non Sq Epi: x / Bacteria: x      RADIOLOGY & ADDITIONAL TESTS: Reviewed.